# Patient Record
Sex: FEMALE | Race: WHITE | NOT HISPANIC OR LATINO
[De-identification: names, ages, dates, MRNs, and addresses within clinical notes are randomized per-mention and may not be internally consistent; named-entity substitution may affect disease eponyms.]

---

## 2017-07-16 ENCOUNTER — TRANSCRIPTION ENCOUNTER (OUTPATIENT)
Age: 77
End: 2017-07-16

## 2017-07-17 ENCOUNTER — INPATIENT (INPATIENT)
Facility: HOSPITAL | Age: 77
LOS: 3 days | Discharge: HOME | DRG: 392 | End: 2017-07-21
Attending: SURGERY | Admitting: SURGERY
Payer: MEDICARE

## 2017-07-17 VITALS
RESPIRATION RATE: 18 BRPM | DIASTOLIC BLOOD PRESSURE: 74 MMHG | HEIGHT: 60 IN | WEIGHT: 139.99 LBS | OXYGEN SATURATION: 96 % | TEMPERATURE: 100 F | HEART RATE: 98 BPM | SYSTOLIC BLOOD PRESSURE: 116 MMHG

## 2017-07-17 DIAGNOSIS — K57.20 DIVERTICULITIS OF LARGE INTESTINE WITH PERFORATION AND ABSCESS WITHOUT BLEEDING: ICD-10-CM

## 2017-07-17 DIAGNOSIS — Z87.898 PERSONAL HISTORY OF OTHER SPECIFIED CONDITIONS: Chronic | ICD-10-CM

## 2017-07-17 LAB
ALBUMIN SERPL ELPH-MCNC: 3.7 G/DL — SIGNIFICANT CHANGE UP (ref 3.3–5.2)
ALP SERPL-CCNC: 71 U/L — SIGNIFICANT CHANGE UP (ref 40–120)
ALT FLD-CCNC: 26 U/L — SIGNIFICANT CHANGE UP
AMYLASE P1 CFR SERPL: 16 U/L — LOW (ref 36–128)
ANION GAP SERPL CALC-SCNC: 11 MMOL/L — SIGNIFICANT CHANGE UP (ref 5–17)
APPEARANCE UR: CLEAR — SIGNIFICANT CHANGE UP
AST SERPL-CCNC: 26 U/L — SIGNIFICANT CHANGE UP
BACTERIA # UR AUTO: ABNORMAL
BASOPHILS # BLD AUTO: 0 K/UL — SIGNIFICANT CHANGE UP (ref 0–0.2)
BASOPHILS NFR BLD AUTO: 0.1 % — SIGNIFICANT CHANGE UP (ref 0–2)
BILIRUB SERPL-MCNC: 0.8 MG/DL — SIGNIFICANT CHANGE UP (ref 0.4–2)
BILIRUB UR-MCNC: NEGATIVE — SIGNIFICANT CHANGE UP
BUN SERPL-MCNC: 7 MG/DL — LOW (ref 8–20)
CALCIUM SERPL-MCNC: 8.9 MG/DL — SIGNIFICANT CHANGE UP (ref 8.6–10.2)
CHLORIDE SERPL-SCNC: 96 MMOL/L — LOW (ref 98–107)
CO2 SERPL-SCNC: 25 MMOL/L — SIGNIFICANT CHANGE UP (ref 22–29)
COLOR SPEC: YELLOW — SIGNIFICANT CHANGE UP
COMMENT - URINE: SIGNIFICANT CHANGE UP
CREAT SERPL-MCNC: 0.59 MG/DL — SIGNIFICANT CHANGE UP (ref 0.5–1.3)
DIFF PNL FLD: ABNORMAL
GLUCOSE SERPL-MCNC: 125 MG/DL — HIGH (ref 70–115)
GLUCOSE UR QL: NEGATIVE MG/DL — SIGNIFICANT CHANGE UP
HCT VFR BLD CALC: 35.7 % — LOW (ref 37–47)
HGB BLD-MCNC: 11.8 G/DL — LOW (ref 12–16)
KETONES UR-MCNC: NEGATIVE — SIGNIFICANT CHANGE UP
LEUKOCYTE ESTERASE UR-ACNC: NEGATIVE — SIGNIFICANT CHANGE UP
LIDOCAIN IGE QN: 14 U/L — LOW (ref 22–51)
LYMPHOCYTES # BLD AUTO: 0.9 K/UL — LOW (ref 1–4.8)
LYMPHOCYTES # BLD AUTO: 6 % — LOW (ref 20–55)
MAGNESIUM SERPL-MCNC: 1.8 MG/DL — SIGNIFICANT CHANGE UP (ref 1.6–2.6)
MCHC RBC-ENTMCNC: 29.6 PG — SIGNIFICANT CHANGE UP (ref 27–31)
MCHC RBC-ENTMCNC: 33.1 G/DL — SIGNIFICANT CHANGE UP (ref 32–36)
MCV RBC AUTO: 89.5 FL — SIGNIFICANT CHANGE UP (ref 81–99)
MONOCYTES # BLD AUTO: 0.5 K/UL — SIGNIFICANT CHANGE UP (ref 0–0.8)
MONOCYTES NFR BLD AUTO: 3.5 % — SIGNIFICANT CHANGE UP (ref 3–10)
NEUTROPHILS # BLD AUTO: 13.6 K/UL — HIGH (ref 1.8–8)
NEUTROPHILS NFR BLD AUTO: 90.3 % — HIGH (ref 37–73)
NITRITE UR-MCNC: NEGATIVE — SIGNIFICANT CHANGE UP
PH UR: 6.5 — SIGNIFICANT CHANGE UP (ref 5–8)
PLATELET # BLD AUTO: 207 K/UL — SIGNIFICANT CHANGE UP (ref 150–400)
POTASSIUM SERPL-MCNC: 3.6 MMOL/L — SIGNIFICANT CHANGE UP (ref 3.5–5.3)
POTASSIUM SERPL-SCNC: 3.6 MMOL/L — SIGNIFICANT CHANGE UP (ref 3.5–5.3)
PROT SERPL-MCNC: 6.2 G/DL — LOW (ref 6.6–8.7)
PROT UR-MCNC: 15 MG/DL
RBC # BLD: 3.99 M/UL — LOW (ref 4.4–5.2)
RBC # FLD: 13 % — SIGNIFICANT CHANGE UP (ref 11–15.6)
RBC CASTS # UR COMP ASSIST: ABNORMAL /HPF (ref 0–4)
SODIUM SERPL-SCNC: 132 MMOL/L — LOW (ref 135–145)
SP GR SPEC: 1.01 — SIGNIFICANT CHANGE UP (ref 1.01–1.02)
UROBILINOGEN FLD QL: NEGATIVE MG/DL — SIGNIFICANT CHANGE UP
WBC # BLD: 15.1 K/UL — HIGH (ref 4.8–10.8)
WBC # FLD AUTO: 15.1 K/UL — HIGH (ref 4.8–10.8)
WBC UR QL: SIGNIFICANT CHANGE UP

## 2017-07-17 PROCEDURE — 74176 CT ABD & PELVIS W/O CONTRAST: CPT | Mod: 26

## 2017-07-17 PROCEDURE — 74020: CPT | Mod: 26

## 2017-07-17 PROCEDURE — 93010 ELECTROCARDIOGRAM REPORT: CPT

## 2017-07-17 PROCEDURE — 99285 EMERGENCY DEPT VISIT HI MDM: CPT

## 2017-07-17 PROCEDURE — 99222 1ST HOSP IP/OBS MODERATE 55: CPT

## 2017-07-17 RX ORDER — SODIUM CHLORIDE 9 MG/ML
500 INJECTION INTRAMUSCULAR; INTRAVENOUS; SUBCUTANEOUS ONCE
Qty: 0 | Refills: 0 | Status: COMPLETED | OUTPATIENT
Start: 2017-07-17 | End: 2017-07-17

## 2017-07-17 RX ORDER — METRONIDAZOLE 500 MG
500 TABLET ORAL ONCE
Qty: 0 | Refills: 0 | Status: COMPLETED | OUTPATIENT
Start: 2017-07-17 | End: 2017-07-17

## 2017-07-17 RX ORDER — CARVEDILOL PHOSPHATE 80 MG/1
3.12 CAPSULE, EXTENDED RELEASE ORAL EVERY 12 HOURS
Qty: 0 | Refills: 0 | Status: DISCONTINUED | OUTPATIENT
Start: 2017-07-17 | End: 2017-07-21

## 2017-07-17 RX ORDER — KETOROLAC TROMETHAMINE 30 MG/ML
15 SYRINGE (ML) INJECTION EVERY 6 HOURS
Qty: 0 | Refills: 0 | Status: DISCONTINUED | OUTPATIENT
Start: 2017-07-17 | End: 2017-07-19

## 2017-07-17 RX ORDER — LOSARTAN POTASSIUM 100 MG/1
25 TABLET, FILM COATED ORAL DAILY
Qty: 0 | Refills: 0 | Status: DISCONTINUED | OUTPATIENT
Start: 2017-07-17 | End: 2017-07-21

## 2017-07-17 RX ORDER — MEROPENEM 1 G/30ML
1000 INJECTION INTRAVENOUS EVERY 8 HOURS
Qty: 0 | Refills: 0 | Status: DISCONTINUED | OUTPATIENT
Start: 2017-07-17 | End: 2017-07-17

## 2017-07-17 RX ORDER — MEROPENEM 1 G/30ML
INJECTION INTRAVENOUS
Qty: 0 | Refills: 0 | Status: DISCONTINUED | OUTPATIENT
Start: 2017-07-17 | End: 2017-07-17

## 2017-07-17 RX ORDER — ONDANSETRON 8 MG/1
4 TABLET, FILM COATED ORAL EVERY 6 HOURS
Qty: 0 | Refills: 0 | Status: DISCONTINUED | OUTPATIENT
Start: 2017-07-17 | End: 2017-07-21

## 2017-07-17 RX ORDER — PIPERACILLIN AND TAZOBACTAM 4; .5 G/20ML; G/20ML
3.38 INJECTION, POWDER, LYOPHILIZED, FOR SOLUTION INTRAVENOUS ONCE
Qty: 0 | Refills: 0 | Status: DISCONTINUED | OUTPATIENT
Start: 2017-07-17 | End: 2017-07-20

## 2017-07-17 RX ORDER — KETOROLAC TROMETHAMINE 30 MG/ML
15 SYRINGE (ML) INJECTION ONCE
Qty: 0 | Refills: 0 | Status: DISCONTINUED | OUTPATIENT
Start: 2017-07-17 | End: 2017-07-17

## 2017-07-17 RX ORDER — SODIUM CHLORIDE 9 MG/ML
1000 INJECTION, SOLUTION INTRAVENOUS
Qty: 0 | Refills: 0 | Status: DISCONTINUED | OUTPATIENT
Start: 2017-07-17 | End: 2017-07-20

## 2017-07-17 RX ORDER — MEROPENEM 1 G/30ML
1000 INJECTION INTRAVENOUS ONCE
Qty: 0 | Refills: 0 | Status: COMPLETED | OUTPATIENT
Start: 2017-07-17 | End: 2017-07-17

## 2017-07-17 RX ORDER — PIPERACILLIN AND TAZOBACTAM 4; .5 G/20ML; G/20ML
3.38 INJECTION, POWDER, LYOPHILIZED, FOR SOLUTION INTRAVENOUS EVERY 8 HOURS
Qty: 0 | Refills: 0 | Status: DISCONTINUED | OUTPATIENT
Start: 2017-07-17 | End: 2017-07-20

## 2017-07-17 RX ORDER — ENOXAPARIN SODIUM 100 MG/ML
40 INJECTION SUBCUTANEOUS EVERY 24 HOURS
Qty: 0 | Refills: 0 | Status: DISCONTINUED | OUTPATIENT
Start: 2017-07-17 | End: 2017-07-21

## 2017-07-17 RX ADMIN — Medication 15 MILLIGRAM(S): at 00:00

## 2017-07-17 RX ADMIN — Medication 15 MILLIGRAM(S): at 20:00

## 2017-07-17 RX ADMIN — LOSARTAN POTASSIUM 25 MILLIGRAM(S): 100 TABLET, FILM COATED ORAL at 21:37

## 2017-07-17 RX ADMIN — SODIUM CHLORIDE 103 MILLILITER(S): 9 INJECTION, SOLUTION INTRAVENOUS at 23:32

## 2017-07-17 RX ADMIN — Medication 15 MILLIGRAM(S): at 06:23

## 2017-07-17 RX ADMIN — Medication 15 MILLIGRAM(S): at 07:15

## 2017-07-17 RX ADMIN — MEROPENEM 200 MILLIGRAM(S): 1 INJECTION INTRAVENOUS at 10:00

## 2017-07-17 RX ADMIN — Medication 100 MILLIGRAM(S): at 11:45

## 2017-07-17 RX ADMIN — SODIUM CHLORIDE 500 MILLILITER(S): 9 INJECTION INTRAMUSCULAR; INTRAVENOUS; SUBCUTANEOUS at 06:23

## 2017-07-17 RX ADMIN — Medication 15 MILLIGRAM(S): at 14:31

## 2017-07-17 RX ADMIN — PIPERACILLIN AND TAZOBACTAM 25 GRAM(S): 4; .5 INJECTION, POWDER, LYOPHILIZED, FOR SOLUTION INTRAVENOUS at 23:33

## 2017-07-17 RX ADMIN — SODIUM CHLORIDE 103 MILLILITER(S): 9 INJECTION, SOLUTION INTRAVENOUS at 16:56

## 2017-07-17 RX ADMIN — Medication 15 MILLIGRAM(S): at 20:02

## 2017-07-17 NOTE — H&P ADULT - NSHPLABSRESULTS_GEN_ALL_CORE
CT A/P: Oral contrast: Perforated  sigmoid  colon  diverticulitis  with  free  air  within  the  upper  abdomen  as  described.  No  drainable  abscess  seen.  No  bowel  obstruction.

## 2017-07-17 NOTE — ED ADULT NURSE REASSESSMENT NOTE - NS ED NURSE REASSESS COMMENT FT1
pt status unchanged, refer to flowsheet and chart, pt safety maintained, pt hemodynamically stable, pending transfer to B2 right
pt status unchanged, refer to flowsheet and chart, pt safety maintained, pt hemodynamically stable

## 2017-07-17 NOTE — H&P ADULT - PROBLEM SELECTOR PLAN 1
Admit to ACS  Neuro: Pain control- Toradol prn   Cardio: Monitor BP and HR, reconcile home medications when patient is advanced to PO  Pulm: Pulse ox w/ vital signs q 4 hours, Encourage incentive spirometry   Gastro: NPO, IVFs- LR @ 103cc/hr  Genitourinary- Monitor urine output  DVT: Lovenox 40 mg daily  ID: Zosyn IV  Labs: trend leukocytosis w/ CBC  Serial abdominal exams  Discussed with patient that if her laboratory and clinical exam does not improve, will highly consider operative intervention for resection of involved colon, possible ostomy Admit to ACS  Neuro: Pain control- Toradol prn   Cardio: Monitor BP and HR, reconcile home medications when patient is advanced to PO  Pulm: Pulse ox w/ vital signs q 4 hours, Encourage incentive spirometry   Gastro: NPO, IVFs- LR @ 103cc/hr  Genitourinary- Monitor urine output  DVT: Lovenox 40 mg daily  ID: Zosyn IV  Labs: trend leukocytosis w/ CBC  Serial abdominal exams  Discussed with patient that if her laboratory and clinical exam does not improve, will need operative intervention for resection of involved colon, possible ostomy

## 2017-07-17 NOTE — H&P ADULT - NSHPPHYSICALEXAM_GEN_ALL_CORE
General: NAD  Chest: Normal S1 and S2   Pulm: CTAB  Abd: soft, nondistended. She is TTP in the RLQ and LLQ. No rebound tenderness, no guarding or rigidity  Ext: No edema

## 2017-07-17 NOTE — H&P ADULT - HISTORY OF PRESENT ILLNESS
76 y/o F comes to the ED w/ c/o abdominal pain since two days ago. The pain started suddenly two days ago at night when the patient was going to sleep, initially described the pain as crampy located in her RLQ radiating ot her LLQ. This is the first time she has had this pain. She went to a walk-in urgent care clinic Dion[place] morning and was treated for possible UTI, was given instructions to return to the ED if her pain did not get better. She comes in today because she still has abdominal pain prominent in the same areas. She recorded her temperature at home and endorsed it was 100.9F however temperature was normal in the urgent care clinic and in the ED. She denies nausea, vomiting, diarrhea, dysuria, chest pain, or palpitations. She had a colonoscopy 3 years ago and upper endoscopy which were both normal. Her last BM was this morning, constipated, currently passing flatus.    Allergies: Ciprofloxacin, Keflex (RASH), codeine (Chest pain)    PMH:  Breast CA 17 years ago  HTN  Pacemaker (Recently interrogated by her Cardiologist one week ago)    PSH:  open appendectomy  Cholecystectomy  Bilateral oophorectomy 78 y/o F comes to the ED w/ c/o abdominal pain since two days ago. The pain started suddenly two days ago at night when the patient was going to sleep, initially described the pain as crampy located in her RLQ radiating to her LLQ. This is the first time she has had this pain. She went to a walk-in urgent care clinic [place] morning and was treated for possible UTI, was given instructions to return to the ED if her pain did not get better. She comes in today because she still has abdominal pain prominent in the same areas. She recorded her temperature at home and noted it was 100.9F however temperature was normal in the urgent care clinic and in the ED. She denies nausea, vomiting, diarrhea, dysuria, chest pain, or palpitations. She had a colonoscopy 3 years ago and upper endoscopy which were both normal. Her last BM was this morning, constipated, currently passing flatus.    Allergies: Ciprofloxacin, Keflex (RASH), codeine (Chest pain)    PMH:  Breast CA 17 years ago  HTN  Pacemaker (Recently interrogated by her Cardiologist one week ago)    PSH:  open appendectomy  Cholecystectomy  Bilateral oophorectomy

## 2017-07-17 NOTE — PATIENT PROFILE ADULT. - ABILITY TO HEAR (WITH HEARING AID OR HEARING APPLIANCE IF NORMALLY USED):
Mildly to Moderately Impaired: difficulty hearing in some environments or speaker may need to increase volume or speak distinctly/b/l ears. wearing hearing aide in each ear

## 2017-07-17 NOTE — ED ADULT NURSE NOTE - OBJECTIVE STATEMENT
Pt states "I was having abdominal pain and went to the urgent care and they told me I have a bladder infection, they told me if I still had pain to come to the hospital", pt has taken 2 macrobid pills yesterday, c/o nausea, denies vomiting

## 2017-07-17 NOTE — ED PROVIDER NOTE - OBJECTIVE STATEMENT
78 yo F p/w lower abd pain x 2 days.  visited urgent care yesterday and was dx with uti.  pain worse now with occasional nausea.  no fever, chills, diarrhea

## 2017-07-18 RX ADMIN — Medication 15 MILLIGRAM(S): at 14:00

## 2017-07-18 RX ADMIN — LOSARTAN POTASSIUM 25 MILLIGRAM(S): 100 TABLET, FILM COATED ORAL at 21:25

## 2017-07-18 RX ADMIN — Medication 15 MILLIGRAM(S): at 06:35

## 2017-07-18 RX ADMIN — PIPERACILLIN AND TAZOBACTAM 25 GRAM(S): 4; .5 INJECTION, POWDER, LYOPHILIZED, FOR SOLUTION INTRAVENOUS at 14:38

## 2017-07-18 RX ADMIN — PIPERACILLIN AND TAZOBACTAM 25 GRAM(S): 4; .5 INJECTION, POWDER, LYOPHILIZED, FOR SOLUTION INTRAVENOUS at 22:18

## 2017-07-18 RX ADMIN — PIPERACILLIN AND TAZOBACTAM 25 GRAM(S): 4; .5 INJECTION, POWDER, LYOPHILIZED, FOR SOLUTION INTRAVENOUS at 06:22

## 2017-07-18 RX ADMIN — Medication 15 MILLIGRAM(S): at 22:33

## 2017-07-18 RX ADMIN — Medication 63.75 MILLIMOLE(S): at 14:38

## 2017-07-18 RX ADMIN — CARVEDILOL PHOSPHATE 3.12 MILLIGRAM(S): 80 CAPSULE, EXTENDED RELEASE ORAL at 06:18

## 2017-07-18 RX ADMIN — Medication 63.75 MILLIMOLE(S): at 11:10

## 2017-07-18 RX ADMIN — SODIUM CHLORIDE 103 MILLILITER(S): 9 INJECTION, SOLUTION INTRAVENOUS at 14:39

## 2017-07-18 RX ADMIN — Medication 15 MILLIGRAM(S): at 22:36

## 2017-07-18 RX ADMIN — Medication 15 MILLIGRAM(S): at 06:32

## 2017-07-18 RX ADMIN — CARVEDILOL PHOSPHATE 3.12 MILLIGRAM(S): 80 CAPSULE, EXTENDED RELEASE ORAL at 16:17

## 2017-07-18 RX ADMIN — ENOXAPARIN SODIUM 40 MILLIGRAM(S): 100 INJECTION SUBCUTANEOUS at 06:30

## 2017-07-18 RX ADMIN — Medication 15 MILLIGRAM(S): at 13:03

## 2017-07-18 NOTE — PROGRESS NOTE ADULT - SUBJECTIVE AND OBJECTIVE BOX
HPI/OVERNIGHT EVENTS: Patient seen and examined at bedside. She reports that abdominal pain has improved since yesterday and that pain medications give some relief. Pain localized mostly to RLQ. She denies nausea / vomiting. Passing flatus. Urinating without difficulty, and OOB ambulating. Denies feelings of fever, chills, CP, or SOB.     MEDICATIONS  (STANDING):  lactated ringers. 1000 milliLiter(s) (103 mL/Hr) IV Continuous <Continuous>  piperacillin/tazobactam IVPB. 3.375 Gram(s) IV Intermittent once  piperacillin/tazobactam IVPB. 3.375 Gram(s) IV Intermittent every 8 hours  enoxaparin Injectable 40 milliGRAM(s) SubCutaneous every 24 hours  losartan 25 milliGRAM(s) Oral daily  carvedilol 3.125 milliGRAM(s) Oral every 12 hours  sodium phosphate IVPB 15 milliMole(s) IV Intermittent every 4 hours    MEDICATIONS  (PRN):  ondansetron Injectable 4 milliGRAM(s) IV Push every 6 hours PRN Nausea  ketorolac   Injectable 15 milliGRAM(s) IV Push every 6 hours PRN Mild Pain (1 - 3)      Vital Signs Last 24 Hrs  T(C): 37.1 (2017 08:37), Max: 37.3 (2017 15:30)  T(F): 98.8 (2017 08:37), Max: 99.1 (2017 15:30)  HR: 66 (2017 08:37) (66 - 78)  BP: 109/59 (2017 08:37) (105/55 - 122/61)  BP(mean): --  RR: 18 (2017 08:37) (18 - 18)  SpO2: 95% (2017 08:37) (94% - 97%)    Constitutional: patient resting comfortably sitting up in bed in no acute distress  HEENT: EOMI / PERRL b/l  Respiratory: respirations are unlabored, no accessory muscle use, no conversational dyspnea  Cardiovascular: regular rate & rhythm  Gastrointestinal: abdomen soft and non-distended, +TTP of RLQ with voluntary guarding, no rebound tenderness  Neurological: GCS: 15 (4/5/6). A&O x 3; no gross sensory / motor / coordination deficits  Psychiatric: Normal mood, normal affect  Musculoskeletal: No joint pain, swelling or deformity; no limitation of movement      I&O's Detail      LABS:                        10.7   9.0   )-----------( 162      ( 2017 06:19 )             31.8         138  |  100  |  10.0  ----------------------------<  108  4.0   |  26.0  |  0.62    Ca    8.8      2017 06:19  Phos  1.8       Mg     2.1         TPro  6.2<L>  /  Alb  3.7  /  TBili  0.8  /  DBili  x   /  AST  26  /  ALT  26  /  AlkPhos  71  07-17    PT/INR - ( 2017 06:19 )   PT: 13.8 sec;   INR: 1.25 ratio         PTT - ( 2017 13:06 )  PTT:28.6 sec  Urinalysis Basic - ( 2017 06:36 )    Color: Yellow / Appearance: Clear / S.010 / pH: x  Gluc: x / Ketone: Negative  / Bili: Negative / Urobili: Negative mg/dL   Blood: x / Protein: 15 mg/dL / Nitrite: Negative   Leuk Esterase: Negative / RBC: 11-25 /HPF / WBC 0-2   Sq Epi: x / Non Sq Epi: x / Bacteria: x

## 2017-07-18 NOTE — PROGRESS NOTE ADULT - PROBLEM SELECTOR PLAN 1
- Continue with conservative mgmt for now  - NPO with IVF  - Continue IV Zosyn  - Serial abdominal exams  - Pain control PRN  - Monitor AM labs, WBC trended down today from 15.1 to 9  - Encourage ambulation  - DVT ppx with lovenox and SCDs  - Encourage incentive payal use for pulm toileting

## 2017-07-19 ENCOUNTER — TRANSCRIPTION ENCOUNTER (OUTPATIENT)
Age: 77
End: 2017-07-19

## 2017-07-19 LAB
-  AMIKACIN: SIGNIFICANT CHANGE UP
-  AMPICILLIN/SULBACTAM: SIGNIFICANT CHANGE UP
-  AMPICILLIN: SIGNIFICANT CHANGE UP
-  AZTREONAM: SIGNIFICANT CHANGE UP
-  CEFAZOLIN: SIGNIFICANT CHANGE UP
-  CEFEPIME: SIGNIFICANT CHANGE UP
-  CEFOXITIN: SIGNIFICANT CHANGE UP
-  CEFTAZIDIME: SIGNIFICANT CHANGE UP
-  CEFTRIAXONE: SIGNIFICANT CHANGE UP
-  CIPROFLOXACIN: SIGNIFICANT CHANGE UP
-  ERTAPENEM: SIGNIFICANT CHANGE UP
-  GENTAMICIN: SIGNIFICANT CHANGE UP
-  IMIPENEM: SIGNIFICANT CHANGE UP
-  LEVOFLOXACIN: SIGNIFICANT CHANGE UP
-  MEROPENEM: SIGNIFICANT CHANGE UP
-  NITROFURANTOIN: SIGNIFICANT CHANGE UP
-  PIPERACILLIN/TAZOBACTAM: SIGNIFICANT CHANGE UP
-  TOBRAMYCIN: SIGNIFICANT CHANGE UP
-  TRIMETHOPRIM/SULFAMETHOXAZOLE: SIGNIFICANT CHANGE UP
ANION GAP SERPL CALC-SCNC: 11 MMOL/L — SIGNIFICANT CHANGE UP (ref 5–17)
BUN SERPL-MCNC: 10 MG/DL — SIGNIFICANT CHANGE UP (ref 8–20)
CALCIUM SERPL-MCNC: 8.6 MG/DL — SIGNIFICANT CHANGE UP (ref 8.6–10.2)
CHLORIDE SERPL-SCNC: 100 MMOL/L — SIGNIFICANT CHANGE UP (ref 98–107)
CO2 SERPL-SCNC: 25 MMOL/L — SIGNIFICANT CHANGE UP (ref 22–29)
CREAT SERPL-MCNC: 0.59 MG/DL — SIGNIFICANT CHANGE UP (ref 0.5–1.3)
CULTURE RESULTS: SIGNIFICANT CHANGE UP
GLUCOSE SERPL-MCNC: 94 MG/DL — SIGNIFICANT CHANGE UP (ref 70–115)
MAGNESIUM SERPL-MCNC: 1.9 MG/DL — SIGNIFICANT CHANGE UP (ref 1.6–2.6)
METHOD TYPE: SIGNIFICANT CHANGE UP
ORGANISM # SPEC MICROSCOPIC CNT: SIGNIFICANT CHANGE UP
ORGANISM # SPEC MICROSCOPIC CNT: SIGNIFICANT CHANGE UP
PHOSPHATE SERPL-MCNC: 2.6 MG/DL — SIGNIFICANT CHANGE UP (ref 2.4–4.7)
POTASSIUM SERPL-MCNC: 3.4 MMOL/L — LOW (ref 3.5–5.3)
POTASSIUM SERPL-SCNC: 3.4 MMOL/L — LOW (ref 3.5–5.3)
SODIUM SERPL-SCNC: 136 MMOL/L — SIGNIFICANT CHANGE UP (ref 135–145)
SPECIMEN SOURCE: SIGNIFICANT CHANGE UP

## 2017-07-19 RX ORDER — IBUPROFEN 200 MG
400 TABLET ORAL EVERY 4 HOURS
Qty: 0 | Refills: 0 | Status: DISCONTINUED | OUTPATIENT
Start: 2017-07-19 | End: 2017-07-21

## 2017-07-19 RX ORDER — DOCUSATE SODIUM 100 MG
100 CAPSULE ORAL THREE TIMES A DAY
Qty: 0 | Refills: 0 | Status: DISCONTINUED | OUTPATIENT
Start: 2017-07-19 | End: 2017-07-21

## 2017-07-19 RX ORDER — ACETAMINOPHEN 500 MG
650 TABLET ORAL EVERY 6 HOURS
Qty: 0 | Refills: 0 | Status: DISCONTINUED | OUTPATIENT
Start: 2017-07-19 | End: 2017-07-21

## 2017-07-19 RX ORDER — SENNA PLUS 8.6 MG/1
2 TABLET ORAL AT BEDTIME
Qty: 0 | Refills: 0 | Status: DISCONTINUED | OUTPATIENT
Start: 2017-07-19 | End: 2017-07-21

## 2017-07-19 RX ADMIN — ENOXAPARIN SODIUM 40 MILLIGRAM(S): 100 INJECTION SUBCUTANEOUS at 05:28

## 2017-07-19 RX ADMIN — LOSARTAN POTASSIUM 25 MILLIGRAM(S): 100 TABLET, FILM COATED ORAL at 21:12

## 2017-07-19 RX ADMIN — PIPERACILLIN AND TAZOBACTAM 25 GRAM(S): 4; .5 INJECTION, POWDER, LYOPHILIZED, FOR SOLUTION INTRAVENOUS at 05:29

## 2017-07-19 RX ADMIN — CARVEDILOL PHOSPHATE 3.12 MILLIGRAM(S): 80 CAPSULE, EXTENDED RELEASE ORAL at 17:55

## 2017-07-19 RX ADMIN — CARVEDILOL PHOSPHATE 3.12 MILLIGRAM(S): 80 CAPSULE, EXTENDED RELEASE ORAL at 05:25

## 2017-07-19 RX ADMIN — PIPERACILLIN AND TAZOBACTAM 25 GRAM(S): 4; .5 INJECTION, POWDER, LYOPHILIZED, FOR SOLUTION INTRAVENOUS at 14:48

## 2017-07-19 RX ADMIN — PIPERACILLIN AND TAZOBACTAM 25 GRAM(S): 4; .5 INJECTION, POWDER, LYOPHILIZED, FOR SOLUTION INTRAVENOUS at 21:12

## 2017-07-19 RX ADMIN — Medication 15 MILLIGRAM(S): at 22:00

## 2017-07-19 RX ADMIN — SODIUM CHLORIDE 103 MILLILITER(S): 9 INJECTION, SOLUTION INTRAVENOUS at 21:12

## 2017-07-19 RX ADMIN — Medication 15 MILLIGRAM(S): at 21:18

## 2017-07-19 NOTE — DISCHARGE NOTE ADULT - CARE PLAN
Principal Discharge DX:	Diverticulitis of colon with perforation  Goal:	Alleviation of pain and symptoms  Instructions for follow-up, activity and diet:	Follow up: Please call and make an appointment with the [facility] 10-14 days after discharge. Also, please call and make an appointment with your primary care physician as per your usual schedule.   Activity: May return to normal activities as tolerated.  Diet: May continue regular diet - recommend a low fiber diet until acute episode of diverticulitis resolved.  Medications: Please take all home medications as prescribed by your primary care doctor. Pain medication has been prescribed for you. Please, take it as it has been prescribed, do not drive or operate heavy machinery while taking narcotics.  You are encouraged to take over-the-counter tylenol and/or ibuprofen for pain relief when you feel your pain no longer warrants the use of narcotic pain medications, however DO NOT TAKE percocet and tylenol at the same time as they contain the same active ingredient (acetaminophen). Take only percocet OR tylenol.  Wound Care: Please, keep wound site clean and dry. You may shower, but do not bathe  [sutures, staples, special instructions]   If confusion, altered mental status, fever, chest pain, shortness of breath, new or worsening [] pain, vomiting, change or worsening of medical status, please come back to the emergency room, and in case of emergency call 911.  Secondary Diagnosis:	HTN (hypertension) Principal Discharge DX:	Diverticulitis of colon with perforation  Goal:	Alleviation of pain and symptoms  Instructions for follow-up, activity and diet:	Follow up: Please call and make an appointment with the Acute Care Surgery Clinic 10-14 days after discharge. Also, please call and make an appointment with your primary care physician as per your usual schedule.   Activity: May return to normal activities as tolerated.  Diet: May continue regular diet - recommend a low fiber diet until acute episode of diverticulitis resolved.  Medications: Please take all home medications as prescribed by your primary care doctor. You may take over-the-counter tylenol and/or ibuprofen for pain relief as needed. You have been sent home on a course of antibiotics - please take as prescribed, do not skip doses. Take with food to avoid stomach upset.  Patient is advised to RETURN TO THE EMERGENCY DEPARTMENT for any of the following - worsening pain, fever/chills, nausea/vomiting, altered mental status, chest pain, shortness of breath, or any other new / worsening symptom.  Secondary Diagnosis:	HTN (hypertension)  Instructions for follow-up, activity and diet:	Please resume all home blood pressure medications at current doses, monitor blood pressure at home, and follow-up with your cardiologist and/or your primary care provider as per your usual schedule.

## 2017-07-19 NOTE — DISCHARGE NOTE ADULT - HOSPITAL COURSE
76 y/o F comes to the ED w/ c/o abdominal pain since two days ago. The pain started suddenly two days ago at night when the patient was going to sleep, initially described the pain as crampy located in her RLQ radiating to her LLQ. This is the first time she has had this pain. She went to a walk-in urgent care clinic Sunday morning and was treated for possible UTI, was given instructions to return to the ED if her pain did not get better. She comes in today because she still has abdominal pain prominent in the same areas. She recorded her temperature at home and noted it was 100.9F however temperature was normal in the urgent care clinic and in the ED. She denies nausea, vomiting, diarrhea, dysuria, chest pain, or palpitations. She had a colonoscopy 3 years ago and upper endoscopy which were both normal. Her last BM was this morning, constipated, currently passing flatus.    Hospital Course: CT abdomen & pelvis on admission showed perforated sigmoid colon diverticulitis with free air within the upper abdomen; no drainable abscess seen; no bowel obstruction. Patient was admitted to the acute care surgery service, placed on IV Abx and kept NPO with IVF and IV pain medications PRN. As WBC trended down and abdominal pain / abdominal exam improved, patient's diet was gradually advanced from NPO to clear liquids to regular. When tolerating regular diet, IV pain medications and IV antibiotics transitioned to oral. Pt tolerated advancement of diet and transition to PO medications well. At time of d/c, pt remains HD well, OOB ambulating, tolerating regular diet, afebrile, abdominal pain improved and controlled on PO non-narcotic medications, voiding and having bowel function. Stable for d/c home with outpatient follow-up as outlined above.    Patient is advised to RETURN TO THE EMERGENCY DEPARTMENT for any of the following - worsening pain, fever/chills, nausea/vomiting, altered mental status, chest pain, shortness of breath, or any other new / worsening symptom.

## 2017-07-19 NOTE — PROGRESS NOTE ADULT - SUBJECTIVE AND OBJECTIVE BOX
INTERVAL HPI/OVERNIGHT EVENTS:  Pt seen and examined at bedside. No acute events overnight. Pt currently denies abdominal pain, nausea, and vomiting. She is out of bed and is ambulating      SUBJECTIVE:      MEDICATIONS  (STANDING):  lactated ringers. 1000 milliLiter(s) (103 mL/Hr) IV Continuous <Continuous>  piperacillin/tazobactam IVPB. 3.375 Gram(s) IV Intermittent once  piperacillin/tazobactam IVPB. 3.375 Gram(s) IV Intermittent every 8 hours  enoxaparin Injectable 40 milliGRAM(s) SubCutaneous every 24 hours  losartan 25 milliGRAM(s) Oral daily  carvedilol 3.125 milliGRAM(s) Oral every 12 hours    MEDICATIONS  (PRN):  ondansetron Injectable 4 milliGRAM(s) IV Push every 6 hours PRN Nausea  ketorolac   Injectable 15 milliGRAM(s) IV Push every 6 hours PRN Mild Pain (1 - 3)      Vital Signs Last 24 Hrs  T(C): 37.2 (19 Jul 2017 08:06), Max: 37.3 (19 Jul 2017 05:01)  T(F): 98.9 (19 Jul 2017 08:06), Max: 99.1 (19 Jul 2017 05:01)  HR: 67 (19 Jul 2017 08:06) (56 - 67)  BP: 126/65 (19 Jul 2017 08:06) (111/51 - 126/65)  BP(mean): --  RR: 16 (19 Jul 2017 08:06) (16 - 18)  SpO2: 94% (19 Jul 2017 08:06) (94% - 97%)    PE  Gen: no acute distress  Pulm: clear to auscultation bilaterally  CV: s1/s2  Abd: soft, non distended. RLQ tenderness. No guarding or rebound.  Vasc: radial pulse 2+, bilateral      I&O's Detail    18 Jul 2017 07:01  -  19 Jul 2017 07:00  --------------------------------------------------------  IN:    lactated ringers.: 927 mL  Total IN: 927 mL    OUT:    Voided: 850 mL  Total OUT: 850 mL    Total NET: 77 mL          LABS:                        10.7   9.0   )-----------( 162      ( 18 Jul 2017 06:19 )             31.8     07-19    136  |  100  |  10.0  ----------------------------<  94  3.4<L>   |  25.0  |  0.59    Ca    8.6      19 Jul 2017 05:42  Phos  2.6     07-19  Mg     1.9     07-19      PT/INR - ( 18 Jul 2017 06:19 )   PT: 13.8 sec;   INR: 1.25 ratio               RADIOLOGY & ADDITIONAL STUDIES:

## 2017-07-19 NOTE — DISCHARGE NOTE ADULT - MEDICATION SUMMARY - MEDICATIONS TO TAKE
I will START or STAY ON the medications listed below when I get home from the hospital:    acetaminophen 325 mg oral tablet  -- 2 tab(s) by mouth every 6 hours, As needed, Mild Pain (1 - 3)  -- Indication: For Pain    irbesartan 150 mg oral tablet  -- 1 tab(s) by mouth once a day  -- Indication: For  Home med    Coreg 3.125 mg oral tablet  -- 1 tab(s) by mouth 2 times a day  -- Indication: For Home med    docusate sodium 100 mg oral capsule  -- 1 cap(s) by mouth 3 times a day  -- Indication: For constipation    senna oral tablet  -- 2 tab(s) by mouth once a day (at bedtime)  -- Indication: For constipatoin    amoxicillin-clavulanate 875 mg-125 mg oral tablet  -- 1 tab(s) by mouth 2 times a day  -- Indication: For Diverticulitis of colon with perforation

## 2017-07-19 NOTE — DISCHARGE NOTE ADULT - PATIENT PORTAL LINK FT
“You can access the [employer] Patient Portal, offered by [facility], by registering with the following website: [email or web address]”

## 2017-07-19 NOTE — PROGRESS NOTE ADULT - ASSESSMENT
75 yo female presents to marzena type 1 diverticulitis   -Advance diet to clear liquids  -Continue IV zosyn  -Pain control PRN  -DVT prophylaxis: lovenox  -Encourage ambulation

## 2017-07-19 NOTE — DISCHARGE NOTE ADULT - PLAN OF CARE
Alleviation of pain and symptoms Follow up: Please call and make an appointment with the Acute Care Surgery Clinic 10-14 days after discharge. Also, please call and make an appointment with your primary care physician as per your usual schedule.   Activity: May return to normal activities as tolerated.  Diet: May continue regular diet - recommend a low fiber diet until acute episode of diverticulitis resolved.  Medications: Please take all home medications as prescribed by your primary care doctor. Pain medication has been prescribed for you. Please, take it as it has been prescribed, do not drive or operate heavy machinery while taking narcotics.  You are encouraged to take over-the-counter tylenol and/or ibuprofen for pain relief when you feel your pain no longer warrants the use of narcotic pain medications, however DO NOT TAKE percocet and tylenol at the same time as they contain the same active ingredient (acetaminophen). Take only percocet OR tylenol.  Wound Care: Please, keep wound site clean and dry. You may shower, but do not bathe  [sutures, staples, special instructions]   If confusion, altered mental status, fever, chest pain, shortness of breath, new or worsening [] pain, vomiting, change or worsening of medical status, please come back to the emergency room, and in case of emergency call 911. Please resume all home blood pressure medications at current doses, monitor blood pressure at home, and follow-up with your cardiologist and/or your primary care provider as per your usual schedule. Follow up: Please call and make an appointment with the Acute Care Surgery Clinic 10-14 days after discharge. Also, please call and make an appointment with your primary care physician as per your usual schedule.   Activity: May return to normal activities as tolerated.  Diet: May continue regular diet - recommend a low fiber diet until acute episode of diverticulitis resolved.  Medications: Please take all home medications as prescribed by your primary care doctor. You may take over-the-counter tylenol and/or ibuprofen for pain relief as needed. You have been sent home on a course of antibiotics - please take as prescribed, do not skip doses. Take with food to avoid stomach upset.  Patient is advised to RETURN TO THE EMERGENCY DEPARTMENT for any of the following - worsening pain, fever/chills, nausea/vomiting, altered mental status, chest pain, shortness of breath, or any other new / worsening symptom.

## 2017-07-19 NOTE — DISCHARGE NOTE ADULT - PROVIDER TOKENS
FREE:[LAST:[[facility]],PHONE:[phone]],FAX:[(   [phone]    -],ADDRESS:[[facility] [place] - [address] [place] [address]]]

## 2017-07-20 DIAGNOSIS — I10 ESSENTIAL (PRIMARY) HYPERTENSION: ICD-10-CM

## 2017-07-20 LAB
ANION GAP SERPL CALC-SCNC: 12 MMOL/L — SIGNIFICANT CHANGE UP (ref 5–17)
BASOPHILS # BLD AUTO: 0 K/UL — SIGNIFICANT CHANGE UP (ref 0–0.2)
BASOPHILS NFR BLD AUTO: 0.1 % — SIGNIFICANT CHANGE UP (ref 0–2)
BUN SERPL-MCNC: 8 MG/DL — SIGNIFICANT CHANGE UP (ref 8–20)
CALCIUM SERPL-MCNC: 8.7 MG/DL — SIGNIFICANT CHANGE UP (ref 8.6–10.2)
CHLORIDE SERPL-SCNC: 101 MMOL/L — SIGNIFICANT CHANGE UP (ref 98–107)
CO2 SERPL-SCNC: 25 MMOL/L — SIGNIFICANT CHANGE UP (ref 22–29)
CREAT SERPL-MCNC: 0.6 MG/DL — SIGNIFICANT CHANGE UP (ref 0.5–1.3)
EOSINOPHIL # BLD AUTO: 0.2 K/UL — SIGNIFICANT CHANGE UP (ref 0–0.5)
EOSINOPHIL NFR BLD AUTO: 2.5 % — SIGNIFICANT CHANGE UP (ref 0–6)
GLUCOSE SERPL-MCNC: 95 MG/DL — SIGNIFICANT CHANGE UP (ref 70–115)
HCT VFR BLD CALC: 29.9 % — LOW (ref 37–47)
HGB BLD-MCNC: 10.3 G/DL — LOW (ref 12–16)
LYMPHOCYTES # BLD AUTO: 0.9 K/UL — LOW (ref 1–4.8)
LYMPHOCYTES # BLD AUTO: 13.6 % — LOW (ref 20–55)
MAGNESIUM SERPL-MCNC: 1.8 MG/DL — SIGNIFICANT CHANGE UP (ref 1.6–2.6)
MCHC RBC-ENTMCNC: 29.9 PG — SIGNIFICANT CHANGE UP (ref 27–31)
MCHC RBC-ENTMCNC: 34.4 G/DL — SIGNIFICANT CHANGE UP (ref 32–36)
MCV RBC AUTO: 86.9 FL — SIGNIFICANT CHANGE UP (ref 81–99)
MONOCYTES # BLD AUTO: 0.5 K/UL — SIGNIFICANT CHANGE UP (ref 0–0.8)
MONOCYTES NFR BLD AUTO: 7.8 % — SIGNIFICANT CHANGE UP (ref 3–10)
NEUTROPHILS # BLD AUTO: 5 K/UL — SIGNIFICANT CHANGE UP (ref 1.8–8)
NEUTROPHILS NFR BLD AUTO: 75.9 % — HIGH (ref 37–73)
PHOSPHATE SERPL-MCNC: 2.5 MG/DL — SIGNIFICANT CHANGE UP (ref 2.4–4.7)
PLATELET # BLD AUTO: 203 K/UL — SIGNIFICANT CHANGE UP (ref 150–400)
POTASSIUM SERPL-MCNC: 3.3 MMOL/L — LOW (ref 3.5–5.3)
POTASSIUM SERPL-SCNC: 3.3 MMOL/L — LOW (ref 3.5–5.3)
RBC # BLD: 3.44 M/UL — LOW (ref 4.4–5.2)
RBC # FLD: 12.8 % — SIGNIFICANT CHANGE UP (ref 11–15.6)
SODIUM SERPL-SCNC: 138 MMOL/L — SIGNIFICANT CHANGE UP (ref 135–145)
WBC # BLD: 6.7 K/UL — SIGNIFICANT CHANGE UP (ref 4.8–10.8)
WBC # FLD AUTO: 6.7 K/UL — SIGNIFICANT CHANGE UP (ref 4.8–10.8)

## 2017-07-20 RX ORDER — POTASSIUM CHLORIDE 20 MEQ
10 PACKET (EA) ORAL
Qty: 0 | Refills: 0 | Status: COMPLETED | OUTPATIENT
Start: 2017-07-20 | End: 2017-07-20

## 2017-07-20 RX ORDER — MAGNESIUM SULFATE 500 MG/ML
2 VIAL (ML) INJECTION ONCE
Qty: 0 | Refills: 0 | Status: COMPLETED | OUTPATIENT
Start: 2017-07-20 | End: 2017-07-20

## 2017-07-20 RX ORDER — POTASSIUM CHLORIDE 20 MEQ
10 PACKET (EA) ORAL ONCE
Qty: 0 | Refills: 0 | Status: DISCONTINUED | OUTPATIENT
Start: 2017-07-20 | End: 2017-07-20

## 2017-07-20 RX ADMIN — Medication 100 MILLIGRAM(S): at 20:54

## 2017-07-20 RX ADMIN — SENNA PLUS 2 TABLET(S): 8.6 TABLET ORAL at 20:55

## 2017-07-20 RX ADMIN — Medication 100 MILLIEQUIVALENT(S): at 15:51

## 2017-07-20 RX ADMIN — Medication 100 MILLIEQUIVALENT(S): at 15:50

## 2017-07-20 RX ADMIN — CARVEDILOL PHOSPHATE 3.12 MILLIGRAM(S): 80 CAPSULE, EXTENDED RELEASE ORAL at 04:57

## 2017-07-20 RX ADMIN — ENOXAPARIN SODIUM 40 MILLIGRAM(S): 100 INJECTION SUBCUTANEOUS at 04:57

## 2017-07-20 RX ADMIN — Medication 100 MILLIGRAM(S): at 04:57

## 2017-07-20 RX ADMIN — LOSARTAN POTASSIUM 25 MILLIGRAM(S): 100 TABLET, FILM COATED ORAL at 23:58

## 2017-07-20 RX ADMIN — CARVEDILOL PHOSPHATE 3.12 MILLIGRAM(S): 80 CAPSULE, EXTENDED RELEASE ORAL at 17:22

## 2017-07-20 RX ADMIN — Medication 1 TABLET(S): at 17:22

## 2017-07-20 RX ADMIN — Medication 50 GRAM(S): at 17:22

## 2017-07-20 RX ADMIN — LOSARTAN POTASSIUM 25 MILLIGRAM(S): 100 TABLET, FILM COATED ORAL at 04:57

## 2017-07-20 RX ADMIN — PIPERACILLIN AND TAZOBACTAM 25 GRAM(S): 4; .5 INJECTION, POWDER, LYOPHILIZED, FOR SOLUTION INTRAVENOUS at 04:56

## 2017-07-20 RX ADMIN — Medication 100 MILLIGRAM(S): at 15:49

## 2017-07-20 NOTE — PROGRESS NOTE ADULT - SUBJECTIVE AND OBJECTIVE BOX
pt seen and examined at bed side.  No acute events overnight.  Pt tolerated clear liquid diet and stated that the pain was getting better    MEDICATIONS  (STANDING):  enoxaparin Injectable 40 milliGRAM(s) SubCutaneous every 24 hours  losartan 25 milliGRAM(s) Oral daily  carvedilol 3.125 milliGRAM(s) Oral every 12 hours  docusate sodium 100 milliGRAM(s) Oral three times a day  senna 2 Tablet(s) Oral at bedtime  amoxicillin  875 milliGRAM(s)/clavulanate 1 Tablet(s) Oral two times a day    MEDICATIONS  (PRN):  ondansetron Injectable 4 milliGRAM(s) IV Push every 6 hours PRN Nausea  acetaminophen   Tablet. 650 milliGRAM(s) Oral every 6 hours PRN Mild Pain (1 - 3)  ibuprofen  Tablet 400 milliGRAM(s) Oral every 4 hours PRN Moderate pain (4-6)      Vital Signs Last 24 Hrs  T(C): 37.1 (20 Jul 2017 19:51), Max: 37.1 (20 Jul 2017 04:40)  T(F): 98.7 (20 Jul 2017 19:51), Max: 98.8 (20 Jul 2017 04:40)  HR: 75 (20 Jul 2017 19:51) (65 - 75)  BP: 123/63 (20 Jul 2017 19:51) (112/59 - 125/69)  BP(mean): --  RR: 18 (20 Jul 2017 19:51) (17 - 21)  SpO2: 98% (20 Jul 2017 19:51) (94% - 98%)    Constitutional: NAD, well-groomed, well-developed  HEENT: PERRLA, EOMI, no drainage or redness  Neck: No bruits; no thyromegaly or nodules,  No JVD  Back: Normal spine flexure, No CVA tenderness, No deformity or limitation of movement  Respiratory: Breath Sounds equal & clear to percussion & auscultation, no accessory muscle use  Cardiovascular: Regular rate & rhythm, normal S1, S2; no murmurs, gallops or rubs; no S3, S4  Gastrointestinal: Soft, minimal tender on the right lower quadrant, no guarding and rigidity  Extremities: No peripheral edema, No cyanosis, clubbing   Vascular: Equal and normal pulses: 2+ peripheral pulses throughout  Neurological: GCS: 15. A&O x 3; no sensory, motor or coordination deficits, normal reflexes  Psychiatric: Normal mood, normal affect  Musculoskeletal: No joint pain, swelling or deformity; no limitation of movement  Skin: No rashes      I&O's Detail    19 Jul 2017 07:01  -  20 Jul 2017 07:00  --------------------------------------------------------  IN:    lactated ringers.: 1236 mL    Oral Fluid: 468 mL    Solution: 50 mL  Total IN: 1754 mL    OUT:  Total OUT: 0 mL    Total NET: 1754 mL      20 Jul 2017 07:01  -  20 Jul 2017 22:38  --------------------------------------------------------  IN:    Oral Fluid: 950 mL    Solution: 300 mL  Total IN: 1250 mL    OUT:  Total OUT: 0 mL    Total NET: 1250 mL          LABS:                        10.3   6.7   )-----------( 203      ( 20 Jul 2017 05:53 )             29.9     07-20    138  |  101  |  8.0  ----------------------------<  95  3.3<L>   |  25.0  |  0.60    Ca    8.7      20 Jul 2017 05:53  Phos  2.5     07-20  Mg     1.8     07-20            RADIOLOGY & ADDITIONAL STUDIES:

## 2017-07-20 NOTE — PROGRESS NOTE ADULT - PROBLEM SELECTOR PLAN 1
iv abx changed to oral abx  diet advanced to regular diet  lovenox for DVT prophylaxis]  Encourage ambulation  serial abd exam

## 2017-07-21 VITALS
OXYGEN SATURATION: 95 % | RESPIRATION RATE: 18 BRPM | HEART RATE: 87 BPM | SYSTOLIC BLOOD PRESSURE: 107 MMHG | DIASTOLIC BLOOD PRESSURE: 66 MMHG

## 2017-07-21 LAB
ANION GAP SERPL CALC-SCNC: 9 MMOL/L — SIGNIFICANT CHANGE UP (ref 5–17)
BUN SERPL-MCNC: 9 MG/DL — SIGNIFICANT CHANGE UP (ref 8–20)
CALCIUM SERPL-MCNC: 9 MG/DL — SIGNIFICANT CHANGE UP (ref 8.6–10.2)
CHLORIDE SERPL-SCNC: 104 MMOL/L — SIGNIFICANT CHANGE UP (ref 98–107)
CO2 SERPL-SCNC: 28 MMOL/L — SIGNIFICANT CHANGE UP (ref 22–29)
CREAT SERPL-MCNC: 0.65 MG/DL — SIGNIFICANT CHANGE UP (ref 0.5–1.3)
GLUCOSE SERPL-MCNC: 111 MG/DL — SIGNIFICANT CHANGE UP (ref 70–115)
HCT VFR BLD CALC: 32 % — LOW (ref 37–47)
HGB BLD-MCNC: 10.7 G/DL — LOW (ref 12–16)
MAGNESIUM SERPL-MCNC: 1.8 MG/DL — SIGNIFICANT CHANGE UP (ref 1.8–2.6)
MCHC RBC-ENTMCNC: 30 PG — SIGNIFICANT CHANGE UP (ref 27–31)
MCHC RBC-ENTMCNC: 33.4 G/DL — SIGNIFICANT CHANGE UP (ref 32–36)
MCV RBC AUTO: 89.6 FL — SIGNIFICANT CHANGE UP (ref 81–99)
PHOSPHATE SERPL-MCNC: 2.1 MG/DL — LOW (ref 2.4–4.7)
PLATELET # BLD AUTO: 247 K/UL — SIGNIFICANT CHANGE UP (ref 150–400)
POTASSIUM SERPL-MCNC: 3.9 MMOL/L — SIGNIFICANT CHANGE UP (ref 3.5–5.3)
POTASSIUM SERPL-SCNC: 3.9 MMOL/L — SIGNIFICANT CHANGE UP (ref 3.5–5.3)
RBC # BLD: 3.57 M/UL — LOW (ref 4.4–5.2)
RBC # FLD: 12.7 % — SIGNIFICANT CHANGE UP (ref 11–15.6)
SODIUM SERPL-SCNC: 141 MMOL/L — SIGNIFICANT CHANGE UP (ref 135–145)
WBC # BLD: 8.3 K/UL — SIGNIFICANT CHANGE UP (ref 4.8–10.8)
WBC # FLD AUTO: 8.3 K/UL — SIGNIFICANT CHANGE UP (ref 4.8–10.8)

## 2017-07-21 RX ORDER — NITROFURANTOIN MACROCRYSTAL 50 MG
1 CAPSULE ORAL
Qty: 0 | Refills: 0 | COMMUNITY

## 2017-07-21 RX ORDER — SODIUM,POTASSIUM PHOSPHATES 278-250MG
1 POWDER IN PACKET (EA) ORAL
Qty: 0 | Refills: 0 | Status: COMPLETED | OUTPATIENT
Start: 2017-07-21 | End: 2017-07-21

## 2017-07-21 RX ORDER — DOCUSATE SODIUM 100 MG
1 CAPSULE ORAL
Qty: 0 | Refills: 0 | COMMUNITY
Start: 2017-07-21

## 2017-07-21 RX ORDER — ACETAMINOPHEN 500 MG
2 TABLET ORAL
Qty: 0 | Refills: 0 | COMMUNITY
Start: 2017-07-21

## 2017-07-21 RX ORDER — SENNA PLUS 8.6 MG/1
2 TABLET ORAL
Qty: 0 | Refills: 0 | COMMUNITY
Start: 2017-07-21

## 2017-07-21 RX ADMIN — Medication 100 MILLIGRAM(S): at 05:06

## 2017-07-21 RX ADMIN — Medication 1 TABLET(S): at 05:06

## 2017-07-21 RX ADMIN — Medication 1 TABLET(S): at 12:24

## 2017-07-21 RX ADMIN — Medication 100 MILLIGRAM(S): at 12:25

## 2017-07-21 RX ADMIN — CARVEDILOL PHOSPHATE 3.12 MILLIGRAM(S): 80 CAPSULE, EXTENDED RELEASE ORAL at 05:06

## 2017-07-21 NOTE — PROGRESS NOTE ADULT - SUBJECTIVE AND OBJECTIVE BOX
INTERVAL HPI/OVERNIGHT EVENTS/SUBJECTIVE:  Patient doing well. on Abx. Complains of gas pain. Tolerating diet. BM. Ambulating       Vital Signs Last 24 Hrs  T(C): 37.3 (21 Jul 2017 04:47), Max: 37.3 (21 Jul 2017 04:47)  T(F): 99.1 (21 Jul 2017 04:47), Max: 99.1 (21 Jul 2017 04:47)  HR: 75 (21 Jul 2017 04:47) (65 - 75)  BP: 144/72 (21 Jul 2017 04:47) (118/61 - 144/72)  BP(mean): --  ABP: --  ABP(mean): --  RR: 17 (21 Jul 2017 04:47) (16 - 21)  SpO2: 94% (21 Jul 2017 04:47) (94% - 98%)      I&O's Detail    20 Jul 2017 07:01  -  21 Jul 2017 07:00  --------------------------------------------------------  IN:    Oral Fluid: 950 mL    Solution: 300 mL  Total IN: 1250 mL    OUT:  Total OUT: 0 mL    Total NET: 1250 mL    MEDICATIONS  (STANDING):  enoxaparin Injectable 40 milliGRAM(s) SubCutaneous every 24 hours  losartan 25 milliGRAM(s) Oral daily  carvedilol 3.125 milliGRAM(s) Oral every 12 hours  docusate sodium 100 milliGRAM(s) Oral three times a day  senna 2 Tablet(s) Oral at bedtime  amoxicillin  875 milliGRAM(s)/clavulanate 1 Tablet(s) Oral two times a day  potassium acid phosphate/sodium acid phosphate tablet (K-PHOS No. 2) 1 Tablet(s) Oral four times a day with meals    MEDICATIONS  (PRN):  ondansetron Injectable 4 milliGRAM(s) IV Push every 6 hours PRN Nausea  acetaminophen   Tablet. 650 milliGRAM(s) Oral every 6 hours PRN Mild Pain (1 - 3)  ibuprofen  Tablet 400 milliGRAM(s) Oral every 4 hours PRN Moderate pain (4-6)    NUTRITION/IVF: Regular/ IVL         MISC:     PHYSICAL EXAM:    Gen: well appearing female in NAD    Eyes: PERRLA    Neurological: GCS 15, a&o    Pulmonary: unlabored    Cardiovascular: NSR    Gastrointestinal: Soft ND NT, no guarding or rebound.     Genitourinary: voiding    Extremities: skin intact, no c/c/e      LABS:  CBC Full  -  ( 21 Jul 2017 05:48 )  WBC Count : 8.3 K/uL  Hemoglobin : 10.7 g/dL  Hematocrit : 32.0 %  Platelet Count - Automated : 247 K/uL  Mean Cell Volume : 89.6 fl  Mean Cell Hemoglobin : 30.0 pg  Mean Cell Hemoglobin Concentration : 33.4 g/dL  Auto Neutrophil # : x  Auto Lymphocyte # : x  Auto Monocyte # : x  Auto Eosinophil # : x  Auto Basophil # : x  Auto Neutrophil % : x  Auto Lymphocyte % : x  Auto Monocyte % : x  Auto Eosinophil % : x  Auto Basophil % : x    07-21    141  |  104  |  9.0  ----------------------------<  111  3.9   |  28.0  |  0.65    Ca    9.0      21 Jul 2017 05:48  Phos  2.1     07-21  Mg     1.8     07-21    RECENT CULTURES:    CAPILLARY BLOOD GLUCOSE      RADIOLOGY & ADDITIONAL STUDIES:    ASSESSMENT/PLAN:  77yFemale presenting with H[place]hey 1 diverticulitis  - ambulate  -diet as tolerated  -augmentin to complete 10 day abx course  -d/c home.

## 2017-07-21 NOTE — PROGRESS NOTE ADULT - ATTENDING COMMENTS
avss  abd soft ntnd  labs reviewed  tolerated po regular diet, and po augmentin  will d/c home today with 6 d of augmentin to complete a 10 day course for diverticulitis. f/u in [facility] clinic next week.

## 2017-07-22 ENCOUNTER — EMERGENCY (EMERGENCY)
Facility: HOSPITAL | Age: 77
LOS: 1 days | Discharge: HOME | End: 2017-07-22
Attending: EMERGENCY MEDICINE
Payer: MEDICARE

## 2017-07-22 VITALS
TEMPERATURE: 100 F | HEART RATE: 88 BPM | RESPIRATION RATE: 20 BRPM | OXYGEN SATURATION: 98 % | SYSTOLIC BLOOD PRESSURE: 138 MMHG | DIASTOLIC BLOOD PRESSURE: 70 MMHG

## 2017-07-22 VITALS — WEIGHT: 139.99 LBS | HEIGHT: 63 IN

## 2017-07-22 DIAGNOSIS — Z87.898 PERSONAL HISTORY OF OTHER SPECIFIED CONDITIONS: Chronic | ICD-10-CM

## 2017-07-22 LAB
ALBUMIN SERPL ELPH-MCNC: 3.2 G/DL — LOW (ref 3.3–5.2)
ALP SERPL-CCNC: 65 U/L — SIGNIFICANT CHANGE UP (ref 40–120)
ALT FLD-CCNC: 19 U/L — SIGNIFICANT CHANGE UP
ANION GAP SERPL CALC-SCNC: 10 MMOL/L — SIGNIFICANT CHANGE UP (ref 5–17)
AST SERPL-CCNC: 26 U/L — SIGNIFICANT CHANGE UP
BASOPHILS # BLD AUTO: 0 K/UL — SIGNIFICANT CHANGE UP (ref 0–0.2)
BASOPHILS NFR BLD AUTO: 0 % — SIGNIFICANT CHANGE UP (ref 0–2)
BILIRUB SERPL-MCNC: 0.2 MG/DL — LOW (ref 0.4–2)
BUN SERPL-MCNC: 7 MG/DL — LOW (ref 8–20)
CALCIUM SERPL-MCNC: 9.7 MG/DL — SIGNIFICANT CHANGE UP (ref 8.6–10.2)
CHLORIDE SERPL-SCNC: 100 MMOL/L — SIGNIFICANT CHANGE UP (ref 98–107)
CO2 SERPL-SCNC: 29 MMOL/L — SIGNIFICANT CHANGE UP (ref 22–29)
CREAT SERPL-MCNC: 0.59 MG/DL — SIGNIFICANT CHANGE UP (ref 0.5–1.3)
EOSINOPHIL # BLD AUTO: 0.6 K/UL — HIGH (ref 0–0.5)
EOSINOPHIL NFR BLD AUTO: 6 % — HIGH (ref 0–5)
GLUCOSE SERPL-MCNC: 115 MG/DL — SIGNIFICANT CHANGE UP (ref 70–115)
HCT VFR BLD CALC: 34.1 % — LOW (ref 37–47)
HGB BLD-MCNC: 11.4 G/DL — LOW (ref 12–16)
LYMPHOCYTES # BLD AUTO: 1.5 K/UL — SIGNIFICANT CHANGE UP (ref 1–4.8)
LYMPHOCYTES # BLD AUTO: 13 % — LOW (ref 20–55)
MCHC RBC-ENTMCNC: 29.8 PG — SIGNIFICANT CHANGE UP (ref 27–31)
MCHC RBC-ENTMCNC: 33.4 G/DL — SIGNIFICANT CHANGE UP (ref 32–36)
MCV RBC AUTO: 89.3 FL — SIGNIFICANT CHANGE UP (ref 81–99)
MONOCYTES # BLD AUTO: 0.6 K/UL — SIGNIFICANT CHANGE UP (ref 0–0.8)
MONOCYTES NFR BLD AUTO: 6 % — SIGNIFICANT CHANGE UP (ref 3–10)
NEUTROPHILS # BLD AUTO: 7.1 K/UL — SIGNIFICANT CHANGE UP (ref 1.8–8)
NEUTROPHILS NFR BLD AUTO: 75 % — HIGH (ref 37–73)
NT-PROBNP SERPL-SCNC: 582 PG/ML — HIGH (ref 0–300)
PLATELET # BLD AUTO: 303 K/UL — SIGNIFICANT CHANGE UP (ref 150–400)
POTASSIUM SERPL-MCNC: 3.6 MMOL/L — SIGNIFICANT CHANGE UP (ref 3.5–5.3)
POTASSIUM SERPL-SCNC: 3.6 MMOL/L — SIGNIFICANT CHANGE UP (ref 3.5–5.3)
PROT SERPL-MCNC: 6.3 G/DL — LOW (ref 6.6–8.7)
RBC # BLD: 3.82 M/UL — LOW (ref 4.4–5.2)
RBC # FLD: 13 % — SIGNIFICANT CHANGE UP (ref 11–15.6)
SODIUM SERPL-SCNC: 139 MMOL/L — SIGNIFICANT CHANGE UP (ref 135–145)
WBC # BLD: 9.9 K/UL — SIGNIFICANT CHANGE UP (ref 4.8–10.8)
WBC # FLD AUTO: 9.9 K/UL — SIGNIFICANT CHANGE UP (ref 4.8–10.8)

## 2017-07-22 PROCEDURE — 93970 EXTREMITY STUDY: CPT | Mod: 26

## 2017-07-22 PROCEDURE — 71020: CPT | Mod: 26

## 2017-07-22 PROCEDURE — 99284 EMERGENCY DEPT VISIT MOD MDM: CPT

## 2017-07-22 NOTE — ED STATDOCS - MUSCULOSKELETAL, MLM
Legs are firm and tense. No calf tenderness. 1+ pitting edema bilaterally. No signs of trauma in lower extremities.

## 2017-07-22 NOTE — ED ADULT TRIAGE NOTE - CHIEF COMPLAINT QUOTE
pt reports swelling to both ankles x 1 day. pt denies sob and chest pain. pt was discharged from [facility] yesterday s/p diverticulosis. pt is febrile at triage

## 2017-07-22 NOTE — ED ADULT NURSE NOTE - OBJECTIVE STATEMENT
patient recently dc from [facility] and today c/o swelling of b/l ankles, denies any pain or trauma, sob, cp.

## 2017-07-22 NOTE — ED STATDOCS - OBJECTIVE STATEMENT
78 y/o F pt with PMHx of HTN and AICD presents to ED c/o bilateral leg swelling and fever x1 day. Pt reports she was discharged from the ED yesterday. She was admitted for perforated diverticulitis. She states that she was laying in bed for a few days while at the hospital but began to walk for the last 3 days. Pt notes that she noticed swelling in the shower last night but woke up this am with worsened swelling to both ankles. She is not currently on blood thinners. Pt denies CP, SOB, HUTSON, nausea, vomiting, abdominal pain, calf pain, back pain, numbness, and tingling. No further complaints at this time. Allergy to Cipro, Keflex, IV contrast, and codeine.  Cardiologist: Dr. Antonio Willett

## 2017-07-22 NOTE — ED STATDOCS - PROGRESS NOTE DETAILS
Pt resting comfortably, vss and in nad at this time. Pt denies c/p, sob and has no other complaints. Pts labs wnl with mild elevation of pro-bnp. Pts CXR discussed with radiologist at [place] -- shows free air under the diaphragm (as expected with hx of perforated diverticulitis last week), otherwise wnl. Pts u/s negative for acute DVT. D/w Dr. Bennett-- pt is stable for d/c with outpt pcp/cardiologist f/u. Pt reports moderate relief of swelling and states she is beginning to be able to see her ankles. Pt resting comfortably, vss and in nad at this time. Pt denies c/p, sob and has no other complaints. Pts labs wnl with mild elevation of pro-bnp. Pts CXR discussed with radiologist at [place] -- shows free air under the diaphragm (as expected with hx of perforated diverticulitis last week), otherwise wnl. Pts u/s negative for acute DVT. D/w Dr. Bennett-- pt is stable for d/c with outpt pcp/cardiologist f/u.

## 2017-07-22 NOTE — ED STATDOCS - ATTENDING CONTRIBUTION TO CARE
I, Lilia Bennett, performed the initial face to face bedside interview with this patient regarding history of present illness, review of symptoms and relevant past medical, social and family history.  I completed an independent physical examination.  I was the initial provider who evaluated this patient.  The history, relevant review of systems, past medical and surgical history, medical decision making, and physical examination was documented by the scribe in my presence and I attest to the accuracy of the documentation.  I have signed out the follow up of any pending tests (i.e. labs, radiological studies) to the ACP.  I have communicated the patient’s plan of care and disposition with the ACP.

## 2017-07-22 NOTE — ED ADULT NURSE NOTE - CHIEF COMPLAINT QUOTE
pt reports swelling to both ankles x 1 day. pt denies sob and chest pain. pt was discharged from [facility] yesterday s/p diverticulosis. pt is febrile at triage

## 2017-07-23 PROBLEM — I10 ESSENTIAL (PRIMARY) HYPERTENSION: Chronic | Status: ACTIVE | Noted: 2017-07-17

## 2017-07-25 ENCOUNTER — INPATIENT (INPATIENT)
Facility: HOSPITAL | Age: 77
LOS: 6 days | Discharge: NURSING, REHAB OR LONG-TERM CARE | DRG: 330 | End: 2017-08-01
Attending: SURGERY | Admitting: SURGERY
Payer: MEDICARE

## 2017-07-25 VITALS
TEMPERATURE: 98 F | SYSTOLIC BLOOD PRESSURE: 128 MMHG | RESPIRATION RATE: 20 BRPM | OXYGEN SATURATION: 96 % | DIASTOLIC BLOOD PRESSURE: 77 MMHG | WEIGHT: 139.99 LBS | HEART RATE: 88 BPM

## 2017-07-25 DIAGNOSIS — Z87.898 PERSONAL HISTORY OF OTHER SPECIFIED CONDITIONS: Chronic | ICD-10-CM

## 2017-07-25 DIAGNOSIS — K57.80 DIVERTICULITIS OF INTESTINE, PART UNSPECIFIED, WITH PERFORATION AND ABSCESS WITHOUT BLEEDING: ICD-10-CM

## 2017-07-25 LAB
ALBUMIN SERPL ELPH-MCNC: 3.6 G/DL — SIGNIFICANT CHANGE UP (ref 3.3–5.2)
ALP SERPL-CCNC: 63 U/L — SIGNIFICANT CHANGE UP (ref 40–120)
ALT FLD-CCNC: 14 U/L — SIGNIFICANT CHANGE UP
ANION GAP SERPL CALC-SCNC: 13 MMOL/L — SIGNIFICANT CHANGE UP (ref 5–17)
APPEARANCE UR: CLEAR — SIGNIFICANT CHANGE UP
AST SERPL-CCNC: 19 U/L — SIGNIFICANT CHANGE UP
BACTERIA # UR AUTO: ABNORMAL
BASOPHILS # BLD AUTO: 0 K/UL — SIGNIFICANT CHANGE UP (ref 0–0.2)
BASOPHILS NFR BLD AUTO: 0.1 % — SIGNIFICANT CHANGE UP (ref 0–2)
BILIRUB SERPL-MCNC: 0.4 MG/DL — SIGNIFICANT CHANGE UP (ref 0.4–2)
BILIRUB UR-MCNC: NEGATIVE — SIGNIFICANT CHANGE UP
BUN SERPL-MCNC: 10 MG/DL — SIGNIFICANT CHANGE UP (ref 8–20)
CALCIUM SERPL-MCNC: 10.2 MG/DL — SIGNIFICANT CHANGE UP (ref 8.6–10.2)
CHLORIDE SERPL-SCNC: 94 MMOL/L — LOW (ref 98–107)
CO2 SERPL-SCNC: 32 MMOL/L — HIGH (ref 22–29)
COLOR SPEC: YELLOW — SIGNIFICANT CHANGE UP
CREAT SERPL-MCNC: 0.65 MG/DL — SIGNIFICANT CHANGE UP (ref 0.5–1.3)
DIFF PNL FLD: ABNORMAL
EOSINOPHIL # BLD AUTO: 0.1 K/UL — SIGNIFICANT CHANGE UP (ref 0–0.5)
EOSINOPHIL NFR BLD AUTO: 0.4 % — SIGNIFICANT CHANGE UP (ref 0–6)
EPI CELLS # UR: SIGNIFICANT CHANGE UP
GLUCOSE SERPL-MCNC: 120 MG/DL — HIGH (ref 70–115)
GLUCOSE UR QL: NEGATIVE MG/DL — SIGNIFICANT CHANGE UP
HCT VFR BLD CALC: 35 % — LOW (ref 37–47)
HGB BLD-MCNC: 11.6 G/DL — LOW (ref 12–16)
KETONES UR-MCNC: ABNORMAL
LEUKOCYTE ESTERASE UR-ACNC: ABNORMAL
LIDOCAIN IGE QN: 24 U/L — SIGNIFICANT CHANGE UP (ref 22–51)
LYMPHOCYTES # BLD AUTO: 1.2 K/UL — SIGNIFICANT CHANGE UP (ref 1–4.8)
LYMPHOCYTES # BLD AUTO: 8.1 % — LOW (ref 20–55)
MCHC RBC-ENTMCNC: 29.2 PG — SIGNIFICANT CHANGE UP (ref 27–31)
MCHC RBC-ENTMCNC: 33.1 G/DL — SIGNIFICANT CHANGE UP (ref 32–36)
MCV RBC AUTO: 88.2 FL — SIGNIFICANT CHANGE UP (ref 81–99)
MONOCYTES # BLD AUTO: 0.5 K/UL — SIGNIFICANT CHANGE UP (ref 0–0.8)
MONOCYTES NFR BLD AUTO: 3.5 % — SIGNIFICANT CHANGE UP (ref 3–10)
NEUTROPHILS # BLD AUTO: 12.6 K/UL — HIGH (ref 1.8–8)
NEUTROPHILS NFR BLD AUTO: 87.6 % — HIGH (ref 37–73)
NITRITE UR-MCNC: NEGATIVE — SIGNIFICANT CHANGE UP
PH UR: 6 — SIGNIFICANT CHANGE UP (ref 5–8)
PLATELET # BLD AUTO: 387 K/UL — SIGNIFICANT CHANGE UP (ref 150–400)
POTASSIUM SERPL-MCNC: 3.8 MMOL/L — SIGNIFICANT CHANGE UP (ref 3.5–5.3)
POTASSIUM SERPL-SCNC: 3.8 MMOL/L — SIGNIFICANT CHANGE UP (ref 3.5–5.3)
PROT SERPL-MCNC: 6.9 G/DL — SIGNIFICANT CHANGE UP (ref 6.6–8.7)
PROT UR-MCNC: 15 MG/DL
RBC # BLD: 3.97 M/UL — LOW (ref 4.4–5.2)
RBC # FLD: 12.7 % — SIGNIFICANT CHANGE UP (ref 11–15.6)
RBC CASTS # UR COMP ASSIST: ABNORMAL /HPF (ref 0–4)
SODIUM SERPL-SCNC: 139 MMOL/L — SIGNIFICANT CHANGE UP (ref 135–145)
SP GR SPEC: 1.02 — SIGNIFICANT CHANGE UP (ref 1.01–1.02)
UROBILINOGEN FLD QL: NEGATIVE MG/DL — SIGNIFICANT CHANGE UP
WBC # BLD: 14.4 K/UL — HIGH (ref 4.8–10.8)
WBC # FLD AUTO: 14.4 K/UL — HIGH (ref 4.8–10.8)
WBC UR QL: SIGNIFICANT CHANGE UP

## 2017-07-25 PROCEDURE — 71010: CPT | Mod: 26

## 2017-07-25 PROCEDURE — 99222 1ST HOSP IP/OBS MODERATE 55: CPT | Mod: AI

## 2017-07-25 PROCEDURE — 74176 CT ABD & PELVIS W/O CONTRAST: CPT | Mod: 26

## 2017-07-25 PROCEDURE — 99285 EMERGENCY DEPT VISIT HI MDM: CPT

## 2017-07-25 PROCEDURE — 74020: CPT | Mod: 26

## 2017-07-25 RX ORDER — MEROPENEM 1 G/30ML
1000 INJECTION INTRAVENOUS EVERY 8 HOURS
Qty: 0 | Refills: 0 | Status: DISCONTINUED | OUTPATIENT
Start: 2017-07-26 | End: 2017-07-27

## 2017-07-25 RX ORDER — ONDANSETRON 8 MG/1
4 TABLET, FILM COATED ORAL ONCE
Qty: 0 | Refills: 0 | Status: COMPLETED | OUTPATIENT
Start: 2017-07-25 | End: 2017-07-25

## 2017-07-25 RX ORDER — CARVEDILOL PHOSPHATE 80 MG/1
3.12 CAPSULE, EXTENDED RELEASE ORAL EVERY 12 HOURS
Qty: 0 | Refills: 0 | Status: DISCONTINUED | OUTPATIENT
Start: 2017-07-25 | End: 2017-07-26

## 2017-07-25 RX ORDER — MEROPENEM 1 G/30ML
INJECTION INTRAVENOUS
Qty: 0 | Refills: 0 | Status: DISCONTINUED | OUTPATIENT
Start: 2017-07-25 | End: 2017-07-27

## 2017-07-25 RX ORDER — LOSARTAN POTASSIUM 100 MG/1
50 TABLET, FILM COATED ORAL DAILY
Qty: 0 | Refills: 0 | Status: DISCONTINUED | OUTPATIENT
Start: 2017-07-25 | End: 2017-07-27

## 2017-07-25 RX ORDER — SODIUM CHLORIDE 9 MG/ML
500 INJECTION INTRAMUSCULAR; INTRAVENOUS; SUBCUTANEOUS ONCE
Qty: 0 | Refills: 0 | Status: DISCONTINUED | OUTPATIENT
Start: 2017-07-25 | End: 2017-07-25

## 2017-07-25 RX ORDER — MEROPENEM 1 G/30ML
1000 INJECTION INTRAVENOUS EVERY 8 HOURS
Qty: 0 | Refills: 0 | Status: DISCONTINUED | OUTPATIENT
Start: 2017-07-25 | End: 2017-07-25

## 2017-07-25 RX ORDER — SODIUM CHLORIDE 9 MG/ML
1000 INJECTION, SOLUTION INTRAVENOUS
Qty: 0 | Refills: 0 | Status: DISCONTINUED | OUTPATIENT
Start: 2017-07-25 | End: 2017-07-27

## 2017-07-25 RX ORDER — SODIUM CHLORIDE 9 MG/ML
500 INJECTION INTRAMUSCULAR; INTRAVENOUS; SUBCUTANEOUS ONCE
Qty: 0 | Refills: 0 | Status: COMPLETED | OUTPATIENT
Start: 2017-07-25 | End: 2017-07-25

## 2017-07-25 RX ORDER — SODIUM CHLORIDE 9 MG/ML
3 INJECTION INTRAMUSCULAR; INTRAVENOUS; SUBCUTANEOUS ONCE
Qty: 0 | Refills: 0 | Status: COMPLETED | OUTPATIENT
Start: 2017-07-25 | End: 2017-07-25

## 2017-07-25 RX ORDER — MEROPENEM 1 G/30ML
1000 INJECTION INTRAVENOUS ONCE
Qty: 0 | Refills: 0 | Status: COMPLETED | OUTPATIENT
Start: 2017-07-25 | End: 2017-07-25

## 2017-07-25 RX ORDER — ENOXAPARIN SODIUM 100 MG/ML
40 INJECTION SUBCUTANEOUS DAILY
Qty: 0 | Refills: 0 | Status: DISCONTINUED | OUTPATIENT
Start: 2017-07-25 | End: 2017-07-27

## 2017-07-25 RX ORDER — VANCOMYCIN HCL 1 G
1000 VIAL (EA) INTRAVENOUS ONCE
Qty: 0 | Refills: 0 | Status: COMPLETED | OUTPATIENT
Start: 2017-07-25 | End: 2017-07-25

## 2017-07-25 RX ADMIN — SODIUM CHLORIDE 500 MILLILITER(S): 9 INJECTION INTRAMUSCULAR; INTRAVENOUS; SUBCUTANEOUS at 11:42

## 2017-07-25 RX ADMIN — Medication 250 MILLIGRAM(S): at 22:00

## 2017-07-25 RX ADMIN — SODIUM CHLORIDE 3 MILLILITER(S): 9 INJECTION INTRAMUSCULAR; INTRAVENOUS; SUBCUTANEOUS at 11:24

## 2017-07-25 RX ADMIN — ONDANSETRON 4 MILLIGRAM(S): 8 TABLET, FILM COATED ORAL at 11:24

## 2017-07-25 RX ADMIN — MEROPENEM 200 MILLIGRAM(S): 1 INJECTION INTRAVENOUS at 18:55

## 2017-07-25 NOTE — ED PROVIDER NOTE - OBJECTIVE STATEMENT
77 year old female with a h/o9 htn presents with generalized weakness. pt was recently admitted for diverticulitis and discharged a few days ago and was discharged on Augmentin . she was in her usoh until one day ago when she developed nausea and vomiting. she denies any diarrhes, fever or chills

## 2017-07-25 NOTE — ED ADULT NURSE NOTE - OBJECTIVE STATEMENT
Patient reports nausea and vomiting since last night. Last BM last night, passing flatulence. Body aches. abdomen soft and nontender

## 2017-07-25 NOTE — H&P ADULT - ASSESSMENT
78 y/o F w/ perforated sigmoid diverticulitis with abscess measuring 4.8 cm secondary to failed medical therapy

## 2017-07-25 NOTE — H&P ADULT - PROBLEM SELECTOR PLAN 1
Admit to [facility] 3T monitored  Neuro: Morphine prn  Cardio: Reconcile home medications, EKG, cardiology consult for risk stratification for operative procedure. Monitor HR and BP.  Pulm: Pulse ox with vital signs q 4 hours  Gastro: NPO, IVFs  ID: Meropenem  DVT: lovenox 40 mg Daily, SCDs  Will consult IR for recommendation on possible window for abscess drainage  If IR is unable to drain abscess, pt will require operative intervention that includes bowel resection, and possible creation of osotomy    Case discussed with Dr. Lopez Admit to ACS 3T monitored  Neuro: Morphine prn  Cardio: Reconcile home medications, EKG, cardiology consult for risk stratification for operative procedure. Monitor HR and BP.  Pulm: Pulse ox with vital signs q 4 hours  Gastro: NPO, IVFs  ID: Meropenem  DVT: lovenox 40 mg Daily, SCDs  Will consult IR for recommendation on possible window for abscess drainage  If IR is unable to drain abscess, pt will require operative intervention that includes bowel resection, and possible creation of ostomy    Case discussed with Dr. Lopez

## 2017-07-25 NOTE — H&P ADULT - ATTENDING COMMENTS
76 yo female well known to the ACS service.  Admitted on 7/17/17 2/2 perforated diverticulitis.  Managed conservatively w/ IV abx and improved.  Discharged on 7/21/17 w/ oral abx.  Patient came to ED on 7/22/17 2/2 concerns over LE swelling.  This improved and she was discharged home.  She then developed po intolerance yesterday.  States that she has been having eructations, nausea and vomiting.  Denies abdominal pain.  Having BM and flatus.  Denies fever/chills.  Afebrile.  HD normal.  WBC: 14.4K; PMNs: 87.6%.  Abdomen: S/NT/ND, no rebound, no guarding, no peritonitis.  CT A/P shows perforated diverticulitis w/ approximately 5cm air filled abscess and some air under the right hemidiaphragm (was also present on prior CT A/P). Advised patient that I did not think the abscess would be amenable to IR drainage given it's location.  Also, this is a failure of medical therapy and have told patient and her 2 daughters that she will need a sigmoidectomy w/ end colostomy.  Admit.  NPO/IVF/IV abx (started on Meropenem in ED).  Serial abdominal exams.  Cardiology for risk stratification.  Have told patient that should her clinical exam or hemodynamics worsen then she will require urgent OR.

## 2017-07-25 NOTE — ED STATDOCS - PROGRESS NOTE DETAILS
77 year old female, with hx of diverticulitis and breast CA, presenting to the ED complaining of nausea and vomiting x 3-4 episodes last night. Pt states that she was admitted here in the hospital 1 week ago for diverticulitis. She states that she also feels associated right lower abdominal pain. Pt states that her BM are normal, and she denies having any fever, chills, or difficulty urination. She states that she is a former smoker. Will transfer to the main ED for further evaluation by another provider.   PMD: Dr. Vazquez  Cardiologist: Dr. Rose 77 year old female, with hx of diverticulitis and breast CA, presenting to the ED complaining of nausea and vomiting x 3-4 episodes last night. Pt states that she was admitted here in the hospital 1 week ago for diverticulitis and was also recently in [facility] ED for swelling to her lower extremities. She states that she also feels associated right lower abdominal pain. Pt states that her BM are normal, and she denies having any fever, chills, or difficulty urination. She states that she is a former smoker. Will transfer to the main ED for further evaluation by another provider.   PMD: Dr. Vazquez  Cardiologist: Dr. Rose

## 2017-07-25 NOTE — ED PROVIDER NOTE - SECONDARY DIAGNOSIS.
Diverticulitis of intestine with perforation without bleeding, unspecified part of intestinal tract Small bowel obstruction

## 2017-07-25 NOTE — ED PROVIDER NOTE - CARE PLAN
Principal Discharge DX:	Diverticulitis of intestine with abscess without bleeding, unspecified part of intestinal tract  Secondary Diagnosis:	Diverticulitis of intestine with perforation without bleeding, unspecified part of intestinal tract  Secondary Diagnosis:	Small bowel obstruction

## 2017-07-25 NOTE — H&P ADULT - HISTORY OF PRESENT ILLNESS
78 y/o F w/ PMH pacemaker (recently interrogated by Cardiologist from [facility] on 07/20) well known to ACS service comes to the ED w/ c/o of PO intolerance since yesterday. She was recently discharged for acute diverticulitis with microperforation of the sigmoid colon on PO Augmentin. She endorses that she has been vomiting everything she has been eating. She denies abdominal pain, last bowel movement was this morning as well as flatus. She denies fevers, chills, chest pain, palpitations, diarrhea, dysuria, dizziness or SOB.     Allergies: Ciprofloxacin Keflex, codeine, IV contrast    PMH:   Breast CA 17 years ago  HTN  Pacemaker    PSH:  open appendectomy  Cholecystectomy  Bilateral oophorectomy 76 y/o F w/ PMH pacemaker (recently interrogated by Cardiologist from [facility] on 07/20) well known to ACS service comes to the ED w/ c/o of PO intolerance since yesterday. She was recently discharged for acute diverticulitis with microperforation of the sigmoid colon on PO Augmentin. She reports that she has been vomiting everything she has been eating. She denies abdominal pain, last bowel movement was this morning as well as flatus. She denies fevers, chills, chest pain, palpitations, diarrhea, dysuria, dizziness or SOB.     Allergies: Ciprofloxacin Keflex, codeine, IV contrast    PMH:   Breast CA 17 years ago  HTN  Pacemaker    PSH:  open appendectomy  Cholecystectomy  Bilateral oophorectomy

## 2017-07-26 LAB
ANION GAP SERPL CALC-SCNC: 11 MMOL/L — SIGNIFICANT CHANGE UP (ref 5–17)
APTT BLD: 26.7 SEC — LOW (ref 27.5–37.4)
BASOPHILS # BLD AUTO: 0 K/UL — SIGNIFICANT CHANGE UP (ref 0–0.2)
BASOPHILS NFR BLD AUTO: 0.1 % — SIGNIFICANT CHANGE UP (ref 0–2)
BLD GP AB SCN SERPL QL: SIGNIFICANT CHANGE UP
BUN SERPL-MCNC: 15 MG/DL — SIGNIFICANT CHANGE UP (ref 8–20)
CALCIUM SERPL-MCNC: 9.6 MG/DL — SIGNIFICANT CHANGE UP (ref 8.6–10.2)
CHLORIDE SERPL-SCNC: 96 MMOL/L — LOW (ref 98–107)
CO2 SERPL-SCNC: 31 MMOL/L — HIGH (ref 22–29)
CREAT SERPL-MCNC: 0.57 MG/DL — SIGNIFICANT CHANGE UP (ref 0.5–1.3)
CULTURE RESULTS: SIGNIFICANT CHANGE UP
EOSINOPHIL # BLD AUTO: 0 K/UL — SIGNIFICANT CHANGE UP (ref 0–0.5)
EOSINOPHIL NFR BLD AUTO: 0.3 % — SIGNIFICANT CHANGE UP (ref 0–6)
GLUCOSE SERPL-MCNC: 119 MG/DL — HIGH (ref 70–115)
HCT VFR BLD CALC: 33.5 % — LOW (ref 37–47)
HGB BLD-MCNC: 11.2 G/DL — LOW (ref 12–16)
INR BLD: 1.18 RATIO — HIGH (ref 0.88–1.16)
LYMPHOCYTES # BLD AUTO: 1.1 K/UL — SIGNIFICANT CHANGE UP (ref 1–4.8)
LYMPHOCYTES # BLD AUTO: 7.4 % — LOW (ref 20–55)
MAGNESIUM SERPL-MCNC: 2.1 MG/DL — SIGNIFICANT CHANGE UP (ref 1.6–2.6)
MCHC RBC-ENTMCNC: 29.3 PG — SIGNIFICANT CHANGE UP (ref 27–31)
MCHC RBC-ENTMCNC: 33.4 G/DL — SIGNIFICANT CHANGE UP (ref 32–36)
MCV RBC AUTO: 87.7 FL — SIGNIFICANT CHANGE UP (ref 81–99)
MONOCYTES # BLD AUTO: 0.5 K/UL — SIGNIFICANT CHANGE UP (ref 0–0.8)
MONOCYTES NFR BLD AUTO: 3.6 % — SIGNIFICANT CHANGE UP (ref 3–10)
NEUTROPHILS # BLD AUTO: 12.8 K/UL — HIGH (ref 1.8–8)
NEUTROPHILS NFR BLD AUTO: 88.2 % — HIGH (ref 37–73)
PHOSPHATE SERPL-MCNC: 2.7 MG/DL — SIGNIFICANT CHANGE UP (ref 2.4–4.7)
PLATELET # BLD AUTO: 386 K/UL — SIGNIFICANT CHANGE UP (ref 150–400)
POTASSIUM SERPL-MCNC: 3.9 MMOL/L — SIGNIFICANT CHANGE UP (ref 3.5–5.3)
POTASSIUM SERPL-SCNC: 3.9 MMOL/L — SIGNIFICANT CHANGE UP (ref 3.5–5.3)
PROTHROM AB SERPL-ACNC: 13 SEC — HIGH (ref 9.8–12.7)
RBC # BLD: 3.82 M/UL — LOW (ref 4.4–5.2)
RBC # FLD: 12.9 % — SIGNIFICANT CHANGE UP (ref 11–15.6)
SODIUM SERPL-SCNC: 138 MMOL/L — SIGNIFICANT CHANGE UP (ref 135–145)
SPECIMEN SOURCE: SIGNIFICANT CHANGE UP
TYPE + AB SCN PNL BLD: SIGNIFICANT CHANGE UP
WBC # BLD: 14.5 K/UL — HIGH (ref 4.8–10.8)
WBC # FLD AUTO: 14.5 K/UL — HIGH (ref 4.8–10.8)

## 2017-07-26 PROCEDURE — 93010 ELECTROCARDIOGRAM REPORT: CPT

## 2017-07-26 PROCEDURE — 93306 TTE W/DOPPLER COMPLETE: CPT | Mod: 26

## 2017-07-26 RX ORDER — ACETAMINOPHEN 500 MG
1000 TABLET ORAL ONCE
Qty: 0 | Refills: 0 | Status: DISCONTINUED | OUTPATIENT
Start: 2017-07-26 | End: 2017-07-27

## 2017-07-26 RX ORDER — CARVEDILOL PHOSPHATE 80 MG/1
3.12 CAPSULE, EXTENDED RELEASE ORAL EVERY 12 HOURS
Qty: 0 | Refills: 0 | Status: DISCONTINUED | OUTPATIENT
Start: 2017-07-26 | End: 2017-07-27

## 2017-07-26 RX ORDER — ACETAMINOPHEN 500 MG
650 TABLET ORAL EVERY 6 HOURS
Qty: 0 | Refills: 0 | Status: DISCONTINUED | OUTPATIENT
Start: 2017-07-26 | End: 2017-07-26

## 2017-07-26 RX ADMIN — SODIUM CHLORIDE 100 MILLILITER(S): 9 INJECTION, SOLUTION INTRAVENOUS at 06:28

## 2017-07-26 RX ADMIN — SODIUM CHLORIDE 100 MILLILITER(S): 9 INJECTION, SOLUTION INTRAVENOUS at 14:52

## 2017-07-26 RX ADMIN — MEROPENEM 200 MILLIGRAM(S): 1 INJECTION INTRAVENOUS at 23:08

## 2017-07-26 RX ADMIN — ENOXAPARIN SODIUM 40 MILLIGRAM(S): 100 INJECTION SUBCUTANEOUS at 14:51

## 2017-07-26 RX ADMIN — MEROPENEM 200 MILLIGRAM(S): 1 INJECTION INTRAVENOUS at 14:52

## 2017-07-26 RX ADMIN — MEROPENEM 200 MILLIGRAM(S): 1 INJECTION INTRAVENOUS at 06:28

## 2017-07-26 NOTE — CONSULT NOTE ADULT - ASSESSMENT
The patient currently hemodynamically stable from CV standpoint. ECG no acute ischemic or injury pattern, no HF sxs, BP near goal. patient currently optimizes from Cardiac standpoint.  Based on RCRI criteria  patient is at low risk for MACE <0.4%  for planned orthopedic procedure. Benefits of procedure outweigh risks    Recc:  Keep K>4 Mg >2  IV lopressor 2.5-5mg q 4hrs ruth ann-op for BP/HR control  Avoid systemic hypotension intra-op   PPM interrogation; no pre-OP programming changes needed at this time. Magnet on stand-by intra-op  2-D echo The patient currently hemodynamically stable from CV standpoint. ECG no acute ischemic or injury pattern, no HF sxs, BP near goal. patient currently optimizes from Cardiac standpoint.  Based on RCRI criteria  patient is at low risk for MACE <0.4%  for planned orthopedic procedure. Benefits of procedure outweigh risks    Recc:  Keep K>4 Mg >2  IV lopressor 2.5-5mg q 4hrs ruth ann-op for BP/HR control  Avoid systemic hypotension intra-op   PPM interrogation; ( NOT pacer dependent)  no pre-OP programming changes needed at this time. Magnet on stand-by intra-op  2-D echo The patient currently hemodynamically stable from CV standpoint. ECG no acute ischemic or injury pattern, no HF sxs, BP near goal. patient currently optimizes from Cardiac standpoint.  Based on RCRI criteria  patient is at low risk for MACE <0.4%  for planned orthopedic procedure. Benefits of procedure outweigh risks    Recc:  Keep K>4 Mg >2  IV lopressor 2.5-5mg q 4hrs ruth ann-op for BP/HR control  Avoid systemic hypotension intra-op   ICD  interrogation pre-op, patient not pacer-dependent . Magnet application over device intra-op *( to suspend shock therapy), device will resume pre-op settings once magnet is removed.  2-D echo

## 2017-07-26 NOTE — CONSULT NOTE ADULT - SUBJECTIVE AND OBJECTIVE BOX
Patient is a 77y old  Female who presents with a chief complaint of 78 y/o F w/ nausea and votming (2017 21:12)      HPI:  78 y/o F w/ PMH pacemaker (recently interrogated by Cardiologist from [facility] on ) well known to ACS service comes to the ED w/ c/o of PO intolerance since yesterday. She was recently discharged for acute diverticulitis with microperforation of the sigmoid colon on PO Augmentin. She reports that she has been vomiting everything she has been eating. She denies abdominal pain, last bowel movement was this morning as well as flatus. She denies fevers, chills, chest pain, palpitations, diarrhea, dysuria, dizziness or SOB.   CV Ros:     Allergies: Ciprofloxacin Keflex, codeine, IV contrast    PMH:   Breast CA 17 years ago  HTN  Pacemaker    PSH:  open appendectomy  Cholecystectomy  Bilateral oophorectomy (2017 21:12)      PAST MEDICAL & SURGICAL HISTORY:  Pacemaker  HTN (hypertension)  H/O lymphadenopathy      PREVIOUS DIAGNOSTIC TESTING:      : CT Abdomen and Pelvis No Cont (17 @ 17:31) >  IMPRESSION:    Diverticular abscess noted in the pelvis resulting in obstruction of an   adjacent small bowel loop and small bowel obstruction. Free perforation   noted with free fluid in the peritoneal cavity as well as   pneumoperitoneum..    These critical values were discussed by Dr. Chung Finney with Dr. Sonali Figueroa on 2017 6:00 PM with read back.    < end of copied text >      STRESS  FINDINGS:    CATHETERIZATION  FINDINGS:      Allergies    Cipro (Rash)  codeine (Faint)  IV Contrast (Rash)  Keflex (Rash)    Intolerances        MEDICATIONS  (STANDING):  meropenem IVPB   IV Intermittent   meropenem IVPB 1000 milliGRAM(s) IV Intermittent every 8 hours  enoxaparin Injectable 40 milliGRAM(s) SubCutaneous daily  lactated ringers. 1000 milliLiter(s) (100 mL/Hr) IV Continuous <Continuous>  losartan 50 milliGRAM(s) Oral daily  carvedilol 3.125 milliGRAM(s) Oral every 12 hours    MEDICATIONS  (PRN):  acetaminophen   Tablet. 650 milliGRAM(s) Oral every 6 hours PRN Mild Pain (1 - 3)      FAMILY HISTORY:  No pertinent family history in first degree relatives      SOCIAL HISTORY:    CIGARETTES:    ALCOHOL:    REVIEW OF SYSTEMS:  CONSTITUTIONAL: No fever, weight loss, or fatigue  EYES: No eye pain, visual disturbances, or discharge  ENMT:  No difficulty hearing, tinnitus, vertigo; No sinus or throat pain  NECK: No pain or stiffness  RESPIRATORY: No cough, wheezing, chills or hemoptysis; No Shortness of Breath  CARDIOVASCULAR: No chest pain, palpitations, passing out, dizziness, or leg swelling  GASTROINTESTINAL: No abdominal or epigastric pain. No nausea, vomiting, or hematemesis; No diarrhea or constipation. No melena or hematochezia.  GENITOURINARY: No dysuria, frequency, hematuria, or incontinence  NEUROLOGICAL: No headaches, memory loss, loss of strength, numbness, or tremors  SKIN: No itching, burning, rashes, or lesions   LYMPH Nodes: No enlarged glands  ENDOCRINE: No heat or cold intolerance; No hair loss  MUSCULOSKELETAL: No joint pain or swelling; No muscle, back, or extremity pain  PSYCHIATRIC: No depression, anxiety, mood swings, or difficulty sleeping  HEME/LYMPH: No easy bruising, or bleeding gums  ALLERY AND IMMUNOLOGIC: No hives or eczema	    Vital Signs Last 24 Hrs  T(C): 36.8 (2017 07:47), Max: 36.9 (2017 11:49)  T(F): 98.3 (2017 07:47), Max: 98.4 (2017 11:49)  HR: 74 (2017 07:47) (74 - 84)  BP: 138/71 (2017 07:47) (138/71 - 142/82)  BP(mean): --  RR: 20 (2017 07:47) (16 - 20)  SpO2: 96% (2017 07:47) (96% - 98%)    Daily     Daily     I&O's Detail      PHYSICAL EXAM:  Appearance: Normal, well nourished	  HEENT:   Normal oral mucosa, PERRL, EOMI, sclera non-icteric	  Lymphatic: No cervical lymphadenopathy  Cardiovascular: Normal S1 S2, No JVD, No cardiac murmurs, No carotid bruits, No peripheral edema  Respiratory: Lungs clear to auscultation	  Psychiatry: A & O x 3, Mood & affect appropriate  Gastrointestinal:  Soft, Non-tender, + BS, no bruits	  Skin: No rashes, No ecchymoses, No cyanosis  Neurologic: Grossly non-focal with full strength in all four extremities  Extremities: Normal range of motion, No clubbing, cyanosis or edema  Vascular: Peripheral pulses palpable 2+ bilaterally      INTERPRETATION OF TELEMETRY:    ECG:    LABS:                        11.2   14.5  )-----------( 386      ( 2017 08:16 )             33.5         138  |  96<L>  |  15.0  ----------------------------<  119<H>  3.9   |  31.0<H>  |  0.57    Ca    9.6      2017 08:16  Phos  2.7       Mg     2.1         TPro  6.9  /  Alb  3.6  /  TBili  0.4  /  DBili  x   /  AST  19  /  ALT  14  /  AlkPhos  63  07-        PT/INR - ( 2017 08:16 )   PT: 13.0 sec;   INR: 1.18 ratio         PTT - ( 2017 08:16 )  PTT:26.7 sec  Urinalysis Basic - ( 2017 11:57 )    Color: Yellow / Appearance: Clear / S.020 / pH: x  Gluc: x / Ketone: Moderate  / Bili: Negative / Urobili: Negative mg/dL   Blood: x / Protein: 15 mg/dL / Nitrite: Negative   Leuk Esterase: Trace / RBC: 6-10 /HPF / WBC 3-5   Sq Epi: x / Non Sq Epi: Few / Bacteria: x      I&O's Summary    BNP  RADIOLOGY & ADDITIONAL STUDIES: Patient is a 77y old  Female who presents with a chief complaint of 76 y/o F w/ nausea and votming (2017 21:12)      HPI:  76 y/o F w/ PMH pacemaker (recently interrogated by Cardiologist from [facility] on ) well known to ACS service comes to the ED w/ c/o of PO intolerance since yesterday. She was recently discharged for acute diverticulitis with microperforation of the sigmoid colon on PO Augmentin. She reports that she has been vomiting everything she has been eating. She denies abdominal pain, last bowel movement was this morning as well as flatus.   CV ROS: patient denies CP orthopnea PND, LE edema syncope or pre-syncope  Co-morbid medical conditions are negative for : CVA, MI, DM, COPD, bleeding or clotting disorders (+) PPM. The patient currently resting comfortable without acute complaints. No evidence of HF on exam  All systems reviewed otherwise negative except as above.      Allergies: Ciprofloxacin Keflex, codeine, IV contrast    PMH:   Breast CA 17 years ago  HTN  Pacemaker    PSH:  open appendectomy  Cholecystectomy  Bilateral oophorectomy (2017 21:12)      PAST MEDICAL & SURGICAL HISTORY:  Pacemaker  HTN (hypertension)  H/O lymphadenopathy      PREVIOUS DIAGNOSTIC TESTING:      : CT Abdomen and Pelvis No Cont (17 @ 17:31) >  IMPRESSION:    Diverticular abscess noted in the pelvis resulting in obstruction of an   adjacent small bowel loop and small bowel obstruction. Free perforation   noted with free fluid in the peritoneal cavity as well as   pneumoperitoneum..    These critical values were discussed by Dr. Chung Finney with Dr. Sonali Figueroa on 2017 6:00 PM with read back.    < end of copied text >      STRESS  FINDINGS:    CATHETERIZATION  FINDINGS:      Allergies    Cipro (Rash)  codeine (Faint)  IV Contrast (Rash)  Keflex (Rash)    Intolerances        MEDICATIONS  (STANDING):  meropenem IVPB   IV Intermittent   meropenem IVPB 1000 milliGRAM(s) IV Intermittent every 8 hours  enoxaparin Injectable 40 milliGRAM(s) SubCutaneous daily  lactated ringers. 1000 milliLiter(s) (100 mL/Hr) IV Continuous <Continuous>  losartan 50 milliGRAM(s) Oral daily  carvedilol 3.125 milliGRAM(s) Oral every 12 hours    MEDICATIONS  (PRN):  acetaminophen   Tablet. 650 milliGRAM(s) Oral every 6 hours PRN Mild Pain (1 - 3)      FAMILY HISTORY:  No pertinent family history in first degree relatives      SOCIAL HISTORY: Lives with family    CIGARETTES:none    ALCOHOL:none    REVIEW OF SYSTEMS:  CONSTITUTIONAL: No fever, weight loss, or fatigue  EYES: No eye pain, visual disturbances, or discharge  ENMT:  No difficulty hearing, tinnitus, vertigo; No sinus or throat pain  NECK: No pain or stiffness  RESPIRATORY: No cough, wheezing, chills or hemoptysis; No Shortness of Breath  CARDIOVASCULAR: No chest pain, palpitations, passing out, dizziness, or leg swelling  GASTROINTESTINAL: No abdominal or epigastric pain. No nausea, vomiting, or hematemesis; No diarrhea or constipation. No melena or hematochezia.  GENITOURINARY: No dysuria, frequency, hematuria, or incontinence  NEUROLOGICAL: No headaches, memory loss, loss of strength, numbness, or tremors  SKIN: No itching, burning, rashes, or lesions   LYMPH Nodes: No enlarged glands  ENDOCRINE: No heat or cold intolerance; No hair loss  MUSCULOSKELETAL: No joint pain or swelling; No muscle, back, or extremity pain  PSYCHIATRIC: No depression, anxiety, mood swings, or difficulty sleeping  HEME/LYMPH: No easy bruising, or bleeding gums  ALLERY AND IMMUNOLOGIC: No hives or eczema	    Vital Signs Last 24 Hrs  T(C): 36.8 (2017 07:47), Max: 36.9 (2017 11:49)  T(F): 98.3 (2017 07:47), Max: 98.4 (2017 11:49)  HR: 74 (2017 07:47) (74 - 84)  BP: 138/71 (2017 07:47) (138/71 - 142/82)  BP(mean): --  RR: 20 (2017 07:47) (16 - 20)  SpO2: 96% (2017 07:47) (96% - 98%)    Daily     Daily     I&O's Detail      PHYSICAL EXAM:  Appearance: Normal, well nourished	  HEENT:   Normal oral mucosa, PERRL, EOMI, sclera non-icteric	  Lymphatic: No cervical lymphadenopathy  Cardiovascular: Normal S1 S2, No JVD, No cardiac murmurs, No carotid bruits, No peripheral edema  Respiratory: Lungs clear to auscultation	  Psychiatry: A & O x 3, Mood & affect appropriate  Gastrointestinal:  Soft, Non-tender, + BS, no bruits	  Skin: No rashes, No ecchymoses, No cyanosis  Neurologic: Grossly non-focal with full strength in all four extremities  Extremities: Normal range of motion, No clubbing, cyanosis or edema  Vascular: Peripheral pulses palpable 2+ bilaterally      INTERPRETATION OF TELEMETRY:    ECG:As     LABS:                        11.2   14.5  )-----------( 386      ( 2017 08:16 )             33.5     07-    138  |  96<L>  |  15.0  ----------------------------<  119<H>  3.9   |  31.0<H>  |  0.57    Ca    9.6      2017 08:16  Phos  2.7       Mg     2.1         TPro  6.9  /  Alb  3.6  /  TBili  0.4  /  DBili  x   /  AST  19  /  ALT  14  /  AlkPhos  63  07-25        PT/INR - ( 2017 08:16 )   PT: 13.0 sec;   INR: 1.18 ratio         PTT - ( 2017 08:16 )  PTT:26.7 sec  Urinalysis Basic - ( 2017 11:57 )    Color: Yellow / Appearance: Clear / S.020 / pH: x  Gluc: x / Ketone: Moderate  / Bili: Negative / Urobili: Negative mg/dL   Blood: x / Protein: 15 mg/dL / Nitrite: Negative   Leuk Esterase: Trace / RBC: 6-10 /HPF / WBC 3-5   Sq Epi: x / Non Sq Epi: Few / Bacteria: x      I&O's Summary    BNP  RADIOLOGY & ADDITIONAL STUDIES: Patient is a 77y old  Female who presents with a chief complaint of 78 y/o F w/ nausea and votming (2017 21:12)      HPI:  78 y/o F w/ PMH ICM  s/p VVI ICD  (recently interrogated by Cardiologist from [place] on ) well known to ACS service comes to the ED w/ c/o of PO intolerance since yesterday. She was recently discharged for acute diverticulitis with microperforation of the sigmoid colon on PO Augmentin. She reports that she has been vomiting everything she has been eating. She denies abdominal pain, last bowel movement was this morning as well as flatus.   CV ROS: patient denies CP orthopnea PND, LE edema syncope or pre-syncope  Co-morbid medical conditions are negative for : CVA, MI, DM, COPD, bleeding or clotting disorders (+) ICD. The patient currently resting comfortable without acute complaints. No evidence of HF on exam  All systems reviewed otherwise negative except as above.      Allergies: Ciprofloxacin Keflex, codeine, IV contrast    PMH:   Breast CA 17 years ago  HTN  ICD    PSH:  open appendectomy  Cholecystectomy  Bilateral oophorectomy (2017 21:12)      PAST MEDICAL & SURGICAL HISTORY:  Pacemaker  HTN (hypertension)  H/O lymphadenopathy      PREVIOUS DIAGNOSTIC TESTING:      : CT Abdomen and Pelvis No Cont (17 @ 17:31) >  IMPRESSION:    Diverticular abscess noted in the pelvis resulting in obstruction of an   adjacent small bowel loop and small bowel obstruction. Free perforation   noted with free fluid in the peritoneal cavity as well as   pneumoperitoneum..    These critical values were discussed by Dr. Chung Finney with Dr. Sonali Figueroa on 2017 6:00 PM with read back.    < end of copied text >      STRESS  FINDINGS:    CATHETERIZATION  FINDINGS:      Allergies    Cipro (Rash)  codeine (Faint)  IV Contrast (Rash)  Keflex (Rash)    Intolerances        MEDICATIONS  (STANDING):  meropenem IVPB   IV Intermittent   meropenem IVPB 1000 milliGRAM(s) IV Intermittent every 8 hours  enoxaparin Injectable 40 milliGRAM(s) SubCutaneous daily  lactated ringers. 1000 milliLiter(s) (100 mL/Hr) IV Continuous <Continuous>  losartan 50 milliGRAM(s) Oral daily  carvedilol 3.125 milliGRAM(s) Oral every 12 hours    MEDICATIONS  (PRN):  acetaminophen   Tablet. 650 milliGRAM(s) Oral every 6 hours PRN Mild Pain (1 - 3)      FAMILY HISTORY:  No pertinent family history in first degree relatives      SOCIAL HISTORY: Lives with family    CIGARETTES:none    ALCOHOL:none    REVIEW OF SYSTEMS:  CONSTITUTIONAL: No fever, weight loss, or fatigue  EYES: No eye pain, visual disturbances, or discharge  ENMT:  No difficulty hearing, tinnitus, vertigo; No sinus or throat pain  NECK: No pain or stiffness  RESPIRATORY: No cough, wheezing, chills or hemoptysis; No Shortness of Breath  CARDIOVASCULAR: No chest pain, palpitations, passing out, dizziness, or leg swelling  GASTROINTESTINAL: No abdominal or epigastric pain. No nausea, vomiting, or hematemesis; No diarrhea or constipation. No melena or hematochezia.  GENITOURINARY: No dysuria, frequency, hematuria, or incontinence  NEUROLOGICAL: No headaches, memory loss, loss of strength, numbness, or tremors  SKIN: No itching, burning, rashes, or lesions   LYMPH Nodes: No enlarged glands  ENDOCRINE: No heat or cold intolerance; No hair loss  MUSCULOSKELETAL: No joint pain or swelling; No muscle, back, or extremity pain  PSYCHIATRIC: No depression, anxiety, mood swings, or difficulty sleeping  HEME/LYMPH: No easy bruising, or bleeding gums  ALLERY AND IMMUNOLOGIC: No hives or eczema	    Vital Signs Last 24 Hrs  T(C): 36.8 (2017 07:47), Max: 36.9 (2017 11:49)  T(F): 98.3 (2017 07:47), Max: 98.4 (2017 11:49)  HR: 74 (2017 07:47) (74 - 84)  BP: 138/71 (2017 07:47) (138/71 - 142/82)  BP(mean): --  RR: 20 (2017 07:47) (16 - 20)  SpO2: 96% (2017 07:47) (96% - 98%)    Daily     Daily     I&O's Detail      PHYSICAL EXAM:  Appearance: Normal, well nourished	  HEENT:   Normal oral mucosa, PERRL, EOMI, sclera non-icteric	  Lymphatic: No cervical lymphadenopathy  Cardiovascular: Normal S1 S2, No JVD, No cardiac murmurs, No carotid bruits, No peripheral edema  Respiratory: Lungs clear to auscultation	  Psychiatry: A & O x 3, Mood & affect appropriate  Gastrointestinal:  Soft, Non-tender, + BS, no bruits	  Skin: No rashes, No ecchymoses, No cyanosis  Neurologic: Grossly non-focal with full strength in all four extremities  Extremities: Normal range of motion, No clubbing, cyanosis or edema  Vascular: Peripheral pulses palpable 2+ bilaterally      INTERPRETATION OF TELEMETRY:    ECG:As     LABS:                        11.2   14.5  )-----------( 386      ( 2017 08:16 )             33.5     07-    138  |  96<L>  |  15.0  ----------------------------<  119<H>  3.9   |  31.0<H>  |  0.57    Ca    9.6      2017 08:16  Phos  2.7       Mg     2.1         TPro  6.9  /  Alb  3.6  /  TBili  0.4  /  DBili  x   /  AST  19  /  ALT  14  /  AlkPhos  63  07-25        PT/INR - ( 2017 08:16 )   PT: 13.0 sec;   INR: 1.18 ratio         PTT - ( 2017 08:16 )  PTT:26.7 sec  Urinalysis Basic - ( 2017 11:57 )    Color: Yellow / Appearance: Clear / S.020 / pH: x  Gluc: x / Ketone: Moderate  / Bili: Negative / Urobili: Negative mg/dL   Blood: x / Protein: 15 mg/dL / Nitrite: Negative   Leuk Esterase: Trace / RBC: 6-10 /HPF / WBC 3-5   Sq Epi: x / Non Sq Epi: Few / Bacteria: x      I&O's Summary    BNP  RADIOLOGY & ADDITIONAL STUDIES: Patient is a 77y old  Female who presents with a chief complaint of 76 y/o F w/ nausea and votming (2017 21:12)      HPI:  76 y/o F w/ PMH ICM  s/p VVI ICD  (recently interrogated by Cardiologist from [place] on ) well known to ACS service comes to the ED w/ c/o of PO intolerance since yesterday. She was recently discharged for acute diverticulitis with microperforation of the sigmoid colon on PO Augmentin. She reports that she has been vomiting everything she has been eating. She denies abdominal pain, last bowel movement was this morning as well as flatus.   CV ROS: patient denies CP orthopnea PND, LE edema syncope or pre-syncope  Co-morbid medical conditions are negative for : CVA, MI, DM, COPD, bleeding or clotting disorders (+) ICD. The patient currently resting comfortable without acute complaints. No evidence of HF on exam  All systems reviewed otherwise negative except as above.      Allergies: Ciprofloxacin Keflex, codeine, IV contrast    PMH:   Breast CA 17 years ago  HTN  ICD    PSH:  open appendectomy  Cholecystectomy  Bilateral oophorectomy (2017 21:12)      PAST MEDICAL & SURGICAL HISTORY:  Pacemaker  HTN (hypertension)  H/O lymphadenopathy      PREVIOUS DIAGNOSTIC TESTING:      : CT Abdomen and Pelvis No Cont (17 @ 17:31) >  IMPRESSION:    Diverticular abscess noted in the pelvis resulting in obstruction of an   adjacent small bowel loop and small bowel obstruction. Free perforation   noted with free fluid in the peritoneal cavity as well as   pneumoperitoneum..    These critical values were discussed by Dr. Chung Finney with Dr. Sonali Figueroa on 2017 6:00 PM with read back.    < end of copied text >        Allergies    Cipro (Rash)  codeine (Faint)  IV Contrast (Rash)  Keflex (Rash)    Intolerances        MEDICATIONS  (STANDING):  meropenem IVPB   IV Intermittent   meropenem IVPB 1000 milliGRAM(s) IV Intermittent every 8 hours  enoxaparin Injectable 40 milliGRAM(s) SubCutaneous daily  lactated ringers. 1000 milliLiter(s) (100 mL/Hr) IV Continuous <Continuous>  losartan 50 milliGRAM(s) Oral daily  carvedilol 3.125 milliGRAM(s) Oral every 12 hours    MEDICATIONS  (PRN):  acetaminophen   Tablet. 650 milliGRAM(s) Oral every 6 hours PRN Mild Pain (1 - 3)      FAMILY HISTORY:  No pertinent family history in first degree relatives      SOCIAL HISTORY: Lives with family    CIGARETTES:none    ALCOHOL:none    REVIEW OF SYSTEMS:  CONSTITUTIONAL: No fever, weight loss, or fatigue  EYES: No eye pain, visual disturbances, or discharge  ENMT:  No difficulty hearing, tinnitus, vertigo; No sinus or throat pain  NECK: No pain or stiffness  RESPIRATORY: No cough, wheezing, chills or hemoptysis; No Shortness of Breath  CARDIOVASCULAR: No chest pain, palpitations, passing out, dizziness, or leg swelling  GASTROINTESTINAL: No abdominal or epigastric pain. No nausea, vomiting, or hematemesis; No diarrhea or constipation. No melena or hematochezia.  GENITOURINARY: No dysuria, frequency, hematuria, or incontinence  NEUROLOGICAL: No headaches, memory loss, loss of strength, numbness, or tremors  SKIN: No itching, burning, rashes, or lesions   LYMPH Nodes: No enlarged glands  ENDOCRINE: No heat or cold intolerance; No hair loss  MUSCULOSKELETAL: No joint pain or swelling; No muscle, back, or extremity pain  PSYCHIATRIC: No depression, anxiety, mood swings, or difficulty sleeping  HEME/LYMPH: No easy bruising, or bleeding gums  ALLERY AND IMMUNOLOGIC: No hives or eczema	    Vital Signs Last 24 Hrs  T(C): 36.8 (2017 07:47), Max: 36.9 (2017 11:49)  T(F): 98.3 (2017 07:47), Max: 98.4 (2017 11:49)  HR: 74 (2017 07:47) (74 - 84)  BP: 138/71 (2017 07:47) (138/71 - 142/82)  BP(mean): --  RR: 20 (2017 07:47) (16 - 20)  SpO2: 96% (2017 07:47) (96% - 98%)    Daily     Daily     I&O's Detail      PHYSICAL EXAM:  Appearance: Normal, well nourished	  HEENT:   Normal oral mucosa, PERRL, EOMI, sclera non-icteric	  Lymphatic: No cervical lymphadenopathy  Cardiovascular: Normal S1 S2, No JVD, No cardiac murmurs, No carotid bruits, No peripheral edema  Respiratory: Lungs clear to auscultation	  Psychiatry: A & O x 3, Mood & affect appropriate  Gastrointestinal:  Soft, Non-tender, + BS, no bruits	  Skin: No rashes, No ecchymoses, No cyanosis  Neurologic: Grossly non-focal with full strength in all four extremities  Extremities: Normal range of motion, No clubbing, cyanosis or edema  Vascular: Peripheral pulses palpable 2+ bilaterally      INTERPRETATION OF TELEMETRY:    ECG:As     LABS:                        11.2   14.5  )-----------( 386      ( 2017 08:16 )             33.5     07-    138  |  96<L>  |  15.0  ----------------------------<  119<H>  3.9   |  31.0<H>  |  0.57    Ca    9.6      2017 08:16  Phos  2.7       Mg     2.1         TPro  6.9  /  Alb  3.6  /  TBili  0.4  /  DBili  x   /  AST  19  /  ALT  14  /  AlkPhos  63  07-25        PT/INR - ( 2017 08:16 )   PT: 13.0 sec;   INR: 1.18 ratio         PTT - ( 2017 08:16 )  PTT:26.7 sec  Urinalysis Basic - ( 2017 11:57 )    Color: Yellow / Appearance: Clear / S.020 / pH: x  Gluc: x / Ketone: Moderate  / Bili: Negative / Urobili: Negative mg/dL   Blood: x / Protein: 15 mg/dL / Nitrite: Negative   Leuk Esterase: Trace / RBC: 6-10 /HPF / WBC 3-5   Sq Epi: x / Non Sq Epi: Few / Bacteria: x      I&O's Summary    BNP  RADIOLOGY & ADDITIONAL STUDIES:

## 2017-07-26 NOTE — ED ADULT NURSE REASSESSMENT NOTE - NS ED NURSE REASSESS COMMENT FT1
Patient A&OX3, Denies any pain or discomfort at this time VSS. safety Maintained.
pt A&Ox4, resp even and unlabored no distress noted, pt denies any pain and has no complaints at this time, will continue to monitor
MD Drummond at bedside orders to hold PO meds for 6am, resp even and unlabored no distress noted, pt denies any pain and has no complaints at this time, will continue to monitor
Patient A&OX3, Denies any pain or discomfort at this time VSS. safety Maintained.

## 2017-07-27 ENCOUNTER — RESULT REVIEW (OUTPATIENT)
Age: 77
End: 2017-07-27

## 2017-07-27 DIAGNOSIS — K57.80 DIVERTICULITIS OF INTESTINE, PART UNSPECIFIED, WITH PERFORATION AND ABSCESS WITHOUT BLEEDING: ICD-10-CM

## 2017-07-27 LAB
ANION GAP SERPL CALC-SCNC: 10 MMOL/L — SIGNIFICANT CHANGE UP (ref 5–17)
BLD GP AB SCN SERPL QL: SIGNIFICANT CHANGE UP
BUN SERPL-MCNC: 16 MG/DL — SIGNIFICANT CHANGE UP (ref 8–20)
CALCIUM SERPL-MCNC: 8.5 MG/DL — LOW (ref 8.6–10.2)
CHLORIDE SERPL-SCNC: 105 MMOL/L — SIGNIFICANT CHANGE UP (ref 98–107)
CO2 SERPL-SCNC: 27 MMOL/L — SIGNIFICANT CHANGE UP (ref 22–29)
CREAT SERPL-MCNC: 0.54 MG/DL — SIGNIFICANT CHANGE UP (ref 0.5–1.3)
GLUCOSE SERPL-MCNC: 86 MG/DL — SIGNIFICANT CHANGE UP (ref 70–115)
GRAM STN FLD: SIGNIFICANT CHANGE UP
HCT VFR BLD CALC: 32.1 % — LOW (ref 37–47)
HGB BLD-MCNC: 10.4 G/DL — LOW (ref 12–16)
MAGNESIUM SERPL-MCNC: 2 MG/DL — SIGNIFICANT CHANGE UP (ref 1.6–2.6)
MCHC RBC-ENTMCNC: 29.5 PG — SIGNIFICANT CHANGE UP (ref 27–31)
MCHC RBC-ENTMCNC: 32.4 G/DL — SIGNIFICANT CHANGE UP (ref 32–36)
MCV RBC AUTO: 90.9 FL — SIGNIFICANT CHANGE UP (ref 81–99)
PHOSPHATE SERPL-MCNC: 1.9 MG/DL — LOW (ref 2.4–4.7)
PLATELET # BLD AUTO: 338 K/UL — SIGNIFICANT CHANGE UP (ref 150–400)
POTASSIUM SERPL-MCNC: 3.6 MMOL/L — SIGNIFICANT CHANGE UP (ref 3.5–5.3)
POTASSIUM SERPL-SCNC: 3.6 MMOL/L — SIGNIFICANT CHANGE UP (ref 3.5–5.3)
RBC # BLD: 3.53 M/UL — LOW (ref 4.4–5.2)
RBC # FLD: 12.9 % — SIGNIFICANT CHANGE UP (ref 11–15.6)
SODIUM SERPL-SCNC: 142 MMOL/L — SIGNIFICANT CHANGE UP (ref 135–145)
SPECIMEN SOURCE: SIGNIFICANT CHANGE UP
TYPE + AB SCN PNL BLD: SIGNIFICANT CHANGE UP
WBC # BLD: 8.2 K/UL — SIGNIFICANT CHANGE UP (ref 4.8–10.8)
WBC # FLD AUTO: 8.2 K/UL — SIGNIFICANT CHANGE UP (ref 4.8–10.8)

## 2017-07-27 PROCEDURE — 88305 TISSUE EXAM BY PATHOLOGIST: CPT | Mod: 26

## 2017-07-27 PROCEDURE — 88307 TISSUE EXAM BY PATHOLOGIST: CPT | Mod: 26

## 2017-07-27 PROCEDURE — 88313 SPECIAL STAINS GROUP 2: CPT | Mod: 26,59

## 2017-07-27 PROCEDURE — 49020 DRAINAGE ABDOM ABSCESS OPEN: CPT | Mod: 59

## 2017-07-27 PROCEDURE — 44625 REPAIR BOWEL OPENING: CPT

## 2017-07-27 PROCEDURE — 44144 PARTIAL REMOVAL OF COLON: CPT

## 2017-07-27 PROCEDURE — 47100 WEDGE BIOPSY OF LIVER: CPT

## 2017-07-27 RX ORDER — ACETAMINOPHEN 500 MG
1000 TABLET ORAL ONCE
Qty: 0 | Refills: 0 | Status: COMPLETED | OUTPATIENT
Start: 2017-07-27 | End: 2017-07-27

## 2017-07-27 RX ORDER — POTASSIUM PHOSPHATE, MONOBASIC POTASSIUM PHOSPHATE, DIBASIC 236; 224 MG/ML; MG/ML
15 INJECTION, SOLUTION INTRAVENOUS ONCE
Qty: 0 | Refills: 0 | Status: COMPLETED | OUTPATIENT
Start: 2017-07-27 | End: 2017-07-27

## 2017-07-27 RX ORDER — POTASSIUM CHLORIDE 20 MEQ
10 PACKET (EA) ORAL
Qty: 0 | Refills: 0 | Status: COMPLETED | OUTPATIENT
Start: 2017-07-27 | End: 2017-07-27

## 2017-07-27 RX ORDER — PANTOPRAZOLE SODIUM 20 MG/1
40 TABLET, DELAYED RELEASE ORAL DAILY
Qty: 0 | Refills: 0 | Status: DISCONTINUED | OUTPATIENT
Start: 2017-07-27 | End: 2017-07-30

## 2017-07-27 RX ORDER — SODIUM CHLORIDE 9 MG/ML
1000 INJECTION, SOLUTION INTRAVENOUS
Qty: 0 | Refills: 0 | Status: DISCONTINUED | OUTPATIENT
Start: 2017-07-27 | End: 2017-07-30

## 2017-07-27 RX ORDER — FENTANYL CITRATE 50 UG/ML
50 INJECTION INTRAVENOUS
Qty: 0 | Refills: 0 | Status: DISCONTINUED | OUTPATIENT
Start: 2017-07-27 | End: 2017-07-27

## 2017-07-27 RX ORDER — MEROPENEM 1 G/30ML
1000 INJECTION INTRAVENOUS EVERY 8 HOURS
Qty: 0 | Refills: 0 | Status: DISCONTINUED | OUTPATIENT
Start: 2017-07-27 | End: 2017-08-01

## 2017-07-27 RX ORDER — HYDROMORPHONE HYDROCHLORIDE 2 MG/ML
0.5 INJECTION INTRAMUSCULAR; INTRAVENOUS; SUBCUTANEOUS
Qty: 0 | Refills: 0 | Status: DISCONTINUED | OUTPATIENT
Start: 2017-07-27 | End: 2017-07-27

## 2017-07-27 RX ORDER — METOPROLOL TARTRATE 50 MG
5 TABLET ORAL EVERY 6 HOURS
Qty: 0 | Refills: 0 | Status: DISCONTINUED | OUTPATIENT
Start: 2017-07-27 | End: 2017-07-29

## 2017-07-27 RX ORDER — HYDROMORPHONE HYDROCHLORIDE 2 MG/ML
30 INJECTION INTRAMUSCULAR; INTRAVENOUS; SUBCUTANEOUS
Qty: 0 | Refills: 0 | Status: DISCONTINUED | OUTPATIENT
Start: 2017-07-27 | End: 2017-07-30

## 2017-07-27 RX ORDER — ENOXAPARIN SODIUM 100 MG/ML
40 INJECTION SUBCUTANEOUS DAILY
Qty: 0 | Refills: 0 | Status: DISCONTINUED | OUTPATIENT
Start: 2017-07-27 | End: 2017-08-01

## 2017-07-27 RX ORDER — ONDANSETRON 8 MG/1
4 TABLET, FILM COATED ORAL ONCE
Qty: 0 | Refills: 0 | Status: DISCONTINUED | OUTPATIENT
Start: 2017-07-27 | End: 2017-07-27

## 2017-07-27 RX ADMIN — POTASSIUM PHOSPHATE, MONOBASIC POTASSIUM PHOSPHATE, DIBASIC 62.5 MILLIMOLE(S): 236; 224 INJECTION, SOLUTION INTRAVENOUS at 23:44

## 2017-07-27 RX ADMIN — FENTANYL CITRATE 50 MICROGRAM(S): 50 INJECTION INTRAVENOUS at 00:00

## 2017-07-27 RX ADMIN — FENTANYL CITRATE 50 MICROGRAM(S): 50 INJECTION INTRAVENOUS at 14:03

## 2017-07-27 RX ADMIN — Medication 1000 MILLIGRAM(S): at 18:00

## 2017-07-27 RX ADMIN — Medication 5 MILLIGRAM(S): at 18:03

## 2017-07-27 RX ADMIN — MEROPENEM 200 MILLIGRAM(S): 1 INJECTION INTRAVENOUS at 05:37

## 2017-07-27 RX ADMIN — SODIUM CHLORIDE 100 MILLILITER(S): 9 INJECTION, SOLUTION INTRAVENOUS at 02:04

## 2017-07-27 RX ADMIN — Medication 400 MILLIGRAM(S): at 17:35

## 2017-07-27 RX ADMIN — Medication 5 MILLIGRAM(S): at 23:43

## 2017-07-27 RX ADMIN — FENTANYL CITRATE 50 MICROGRAM(S): 50 INJECTION INTRAVENOUS at 14:36

## 2017-07-27 RX ADMIN — MEROPENEM 200 MILLIGRAM(S): 1 INJECTION INTRAVENOUS at 23:39

## 2017-07-27 RX ADMIN — Medication 100 MILLIEQUIVALENT(S): at 19:53

## 2017-07-27 RX ADMIN — MEROPENEM 200 MILLIGRAM(S): 1 INJECTION INTRAVENOUS at 14:04

## 2017-07-27 RX ADMIN — CARVEDILOL PHOSPHATE 3.12 MILLIGRAM(S): 80 CAPSULE, EXTENDED RELEASE ORAL at 05:37

## 2017-07-27 RX ADMIN — PANTOPRAZOLE SODIUM 40 MILLIGRAM(S): 20 TABLET, DELAYED RELEASE ORAL at 23:43

## 2017-07-27 RX ADMIN — HYDROMORPHONE HYDROCHLORIDE 30 MILLILITER(S): 2 INJECTION INTRAMUSCULAR; INTRAVENOUS; SUBCUTANEOUS at 23:34

## 2017-07-27 RX ADMIN — Medication 100 MILLIEQUIVALENT(S): at 17:35

## 2017-07-27 RX ADMIN — FENTANYL CITRATE 50 MICROGRAM(S): 50 INJECTION INTRAVENOUS at 15:00

## 2017-07-27 NOTE — PROGRESS NOTE ADULT - SUBJECTIVE AND OBJECTIVE BOX
Patient is a 77y old  Female who presents with a chief complaint of I was having severe nausea   and vomiting  and right sided pain.  She is scheduled to have an EXPLORATORY LAPAROTOMY  SIGMOIDECTOMY, END COLOSTOMY, AND DRAINAGE OF AN INTRA-ABDOMINAL ABSCESS   (2017 01:43)      PAST MEDICAL HISTORY:  Pacemaker-AICD  HTN (hypertension)  Right breast cancer s/p 35 doses of R.T.    PAST SURGICAL HISTORY:  H/O lymphadenopathy  Appendectomy  Cholecystectomy    MEDICATIONS  (STANDING):  meropenem IVPB   IV Intermittent   meropenem IVPB 1000 milliGRAM(s) IV Intermittent every 8 hours  enoxaparin Injectable 40 milliGRAM(s) SubCutaneous daily  lactated ringers. 1000 milliLiter(s) (100 mL/Hr) IV Continuous <Continuous>  losartan 50 milliGRAM(s) Oral daily  carvedilol 3.125 milliGRAM(s) Oral every 12 hours    MEDICATIONS  (PRN):  acetaminophen  IVPB. 1000 milliGRAM(s) IV Intermittent once PRN Mild Pain (1 - 3)      Allergies:  Cipro (Rash)                  codeine (Faint)                  IV Contrast (Rash)                  Keflex (Rash)    SOCIAL HISTORY:  The patient doesn't drink alcohol or use illicit drugs. She quit smoking 50                                 years ago after she smoked for 1/2 ppd x 10 years.                          11.2   14.5  )-----------( 386      ( 2017 08:16 )             33.5       PT/INR - ( 2017 08:16 )   PT: 13.0 sec;   INR: 1.18 ratio         PTT - ( 2017 08:16 )  PTT:26.7 sec        138  |  96<L>  |  15.0  ----------------------------<  119<H>  3.9   |  31.0<H>  |  0.57    Ca    9.6      2017 08:16  Phos  2.7       Mg     2.1         TPro  6.9  /  Alb  3.6  /  TBili  0.4  /  DBili  x   /  AST  19  /  ALT  14  /  AlkPhos  63        Height (cm): 160.02 ( @ 01:21)  Weight (kg): 70 ( @ 01:21)  BMI (kg/m2): 27.3 ( @ 01:21)    EK2017  Normal sinus rhythm  Left axis deviation  Incomplete left bundle branch block  Moderate voltage criteria for LVH, may be normal variant   Summary:   1. Technically fair study.   2. Mildly decreased global left ventricular systolic function.   3. Left ventricular ejection fraction, by visual estimation, is 40 to   45%.   4. Spectral Doppler shows impaired relaxation pattern of left   ventricular myocardial filling (Grade I diastolic dysfunction).   5. Basal anterior segment and basal inferior segment are abnormal as   described above.   6. Thickening of the anterior and posterior mitral valve leaflets.   7. Mild mitral annular calcification.   8. Moderate to severe aortic regurgitation.   9. Sclerotic aortic valve with decreased opening.  10. Mildly enlarged left atrium.  11. Abnormal septal motion consistent with RV pacemaker.    CXR    IMPRESSION: No acute cardiopulmonary disease process.    ASA # = 3   Mallampati # = 3  (Upper plate & 2 hearing aids)

## 2017-07-27 NOTE — PATIENT PROFILE ADULT. - CENTRAL VENOUS CATHETER
-Lab Results & Recommendations Relayed  Patient is schedule for a 2 hr GTT & A1c on Wednesday 1/18/17 @ 9AM.  Patient states understanding of these recommendations & the Prep needed for this study.     ---- Message from Erik Sood MD sent at 12/15/2016 10:03 AM CST -----  Glu up . Recommend 2 hr GTT and HbA1c    
no

## 2017-07-27 NOTE — PATIENT PROFILE ADULT. - ABILITY TO HEAR (WITH HEARING AID OR HEARING APPLIANCE IF NORMALLY USED):
Mildly to Moderately Impaired: difficulty hearing in some environments or speaker may need to increase volume or speak distinctly/uses hearing aids

## 2017-07-27 NOTE — PROGRESS NOTE ADULT - SUBJECTIVE AND OBJECTIVE BOX
INTERVAL HPI/OVERNIGHT EVENTS: Patient seen and examined at bedside. No acute events overnight. Patient is scheduled for OR today to drain the abscess and She has been NPO. Denies any pain, fever, chills, nausea, vomiting, shortness of breath or chest pain.       MEDICATIONS  (STANDING):  meropenem IVPB   IV Intermittent   meropenem IVPB 1000 milliGRAM(s) IV Intermittent every 8 hours  enoxaparin Injectable 40 milliGRAM(s) SubCutaneous daily  lactated ringers. 1000 milliLiter(s) (100 mL/Hr) IV Continuous <Continuous>  losartan 50 milliGRAM(s) Oral daily  carvedilol 3.125 milliGRAM(s) Oral every 12 hours    MEDICATIONS  (PRN):  acetaminophen  IVPB. 1000 milliGRAM(s) IV Intermittent once PRN Mild Pain (1 - 3)      Vital Signs Last 24 Hrs  T(C): 36.9 (2017 06:24), Max: 36.9 (2017 13:32)  T(F): 98.4 (2017 06:24), Max: 98.4 (2017 13:32)  HR: 74 (2017 06:24) (74 - 83)  BP: 136/72 (2017 06:24) (105/68 - 138/71)  BP(mean): --  RR: 18 (2017 06:24) (18 - 20)  SpO2: 96% (2017 06:24) (95% - 100%)    Physical Exam:    Neurological:  No sensory/motor deficits  Respiratory: Breath Sounds equal & clear to auscultation, no accessory muscle use  Cardiovascular: Regular rate & rhythm, normal S1, S2; no murmurs, gallops or rubs  Gastrointestinal: Soft, non-tender, normal bowel sounds  Extremities: No peripheral edema, No cyanosis, clubbing   Vascular: Equal and normal pulses: 2+ peripheral pulses throughout  Musculoskeletal: No joint pain, swelling or deformity; no limitation of movement  Skin: No rashes      I&O's Detail    2017 07:01  -  2017 07:00  --------------------------------------------------------  IN:    lactated ringers.: 500 mL    Solution: 100 mL  Total IN: 600 mL    OUT:  Total OUT: 0 mL    Total NET: 600 mL          LABS:                        11.2   14.5  )-----------( 386      ( 2017 08:16 )             33.5         142  |  105  |  16.0  ----------------------------<  86  3.6   |  27.0  |  0.54    Ca    8.5<L>      2017 06:21  Phos  1.9       Mg     2.0         TPro  6.9  /  Alb  3.6  /  TBili  0.4  /  DBili  x   /  AST  19  /  ALT  14  /  AlkPhos  63  07-25    PT/INR - ( 2017 08:16 )   PT: 13.0 sec;   INR: 1.18 ratio         PTT - ( 2017 08:16 )  PTT:26.7 sec  Urinalysis Basic - ( 2017 11:57 )    Color: Yellow / Appearance: Clear / S.020 / pH: x  Gluc: x / Ketone: Moderate  / Bili: Negative / Urobili: Negative mg/dL   Blood: x / Protein: 15 mg/dL / Nitrite: Negative   Leuk Esterase: Trace / RBC: 6-10 /HPF / WBC 3-5   Sq Epi: x / Non Sq Epi: Few / Bacteria: x        RADIOLOGY & ADDITIONAL STUDIES:

## 2017-07-27 NOTE — BRIEF OPERATIVE NOTE - SPECIMENS
sigmoid colon, small bowel segment, proximal donut, distal donut sigmoid colon, small bowel segment, proximal donut, distal donut, nodule of liver

## 2017-07-27 NOTE — PROGRESS NOTE ADULT - SUBJECTIVE AND OBJECTIVE BOX
POST OP NOTE:    STATUS POST:    Liver biopsy  07/27/2017    Sigmoidectomy    Exploratory laparotomy    drainage of pelvic abscess   Ileostomy    Mobilization of splenic flexure    Small bowel resection, partial      Patient was seen and examined at bedside post operatively. Patient was doing well but complaining of cramping abdominal pain. Abdomen soft, diffusely tender, and non distended.      MEDICATIONS  (STANDING):  enoxaparin Injectable 40 milliGRAM(s) SubCutaneous daily  lactated ringers. 1000 milliLiter(s) (100 mL/Hr) IV Continuous <Continuous>  meropenem IVPB 1000 milliGRAM(s) IV Intermittent every 8 hours  metoprolol Injectable 5 milliGRAM(s) IV Push every 6 hours  pantoprazole  Injectable 40 milliGRAM(s) IV Push daily  potassium phosphate IVPB 15 milliMole(s) IV Intermittent once  HYDROmorphone PCA (1 mG/mL) 30 milliLiter(s) PCA Continuous PCA Continuous    MEDICATIONS  (PRN):      Vital Signs Last 24 Hrs  T(C): 36.7 (27 Jul 2017 19:14), Max: 36.9 (27 Jul 2017 06:24)  T(F): 98 (27 Jul 2017 19:14), Max: 98.4 (27 Jul 2017 06:24)  HR: 81 (27 Jul 2017 19:14) (74 - 99)  BP: 91/57 (27 Jul 2017 19:14) (88/43 - 136/72)  BP(mean): --  RR: 18 (27 Jul 2017 19:14) (11 - 21)  SpO2: 96% (27 Jul 2017 19:14) (95% - 100%)    Physical Exam:    Neurological:  No sensory/motor deficits  HEENT: PERRLA, EOMI  Respiratory: Breath Sounds equal & CTA bilaterally, no accessory muscle use  Cardiovascular: Regular rate & rhythm, normal S1, S2; no murmurs, gallops or rubs  Gastrointestinal: Soft, non-tender, nondistended  Extremities:   Vascular: Equal and normal pulses: 2+ peripheral pulses throughout  Musculoskeletal: No joint pain, swelling or deformity; no limitation of movement  Skin: No rashes      I&O's Detail    26 Jul 2017 07:01  -  27 Jul 2017 07:00  --------------------------------------------------------  IN:    lactated ringers.: 500 mL    Solution: 100 mL  Total IN: 600 mL    OUT:  Total OUT: 0 mL    Total NET: 600 mL      27 Jul 2017 07:01  -  27 Jul 2017 23:39  --------------------------------------------------------  IN:  Total IN: 0 mL    OUT:    Indwelling Catheter - Urethral: 200 mL  Total OUT: 200 mL    Total NET: -200 mL          LABS:                        10.4   8.2   )-----------( 338      ( 27 Jul 2017 06:21 )             32.1     07-27    142  |  105  |  16.0  ----------------------------<  86  3.6   |  27.0  |  0.54    Ca    8.5<L>      27 Jul 2017 06:21  Phos  1.9     07-27  Mg     2.0     07-27      PT/INR - ( 26 Jul 2017 08:16 )   PT: 13.0 sec;   INR: 1.18 ratio         PTT - ( 26 Jul 2017 08:16 )  PTT:26.7 sec      RADIOLOGY & ADDITIONAL STUDIES: POST OP NOTE:    STATUS POST:    Liver biopsy  07/27/2017    Sigmoidectomy    Exploratory laparotomy    drainage of pelvic abscess   Ileostomy    Mobilization of splenic flexure    Small bowel resection, partial      Patient was seen and examined at bedside post operatively. Patient complaining of mild cramping abdominal pain but overall doing well. Abdomen soft, diffusely tender, and non distended. Incision sites clear, dry and intact. Ostomy bag was functioning. NGT output was 50cc and Pradhan bag contained 125cc. Pt denied fever, chills, SOB, chest pain, nausea, and vomiting.       MEDICATIONS  (STANDING):  enoxaparin Injectable 40 milliGRAM(s) SubCutaneous daily  lactated ringers. 1000 milliLiter(s) (100 mL/Hr) IV Continuous <Continuous>  meropenem IVPB 1000 milliGRAM(s) IV Intermittent every 8 hours  metoprolol Injectable 5 milliGRAM(s) IV Push every 6 hours  pantoprazole  Injectable 40 milliGRAM(s) IV Push daily  potassium phosphate IVPB 15 milliMole(s) IV Intermittent once  HYDROmorphone PCA (1 mG/mL) 30 milliLiter(s) PCA Continuous PCA Continuous    MEDICATIONS  (PRN):      Vital Signs Last 24 Hrs  T(C): 36.7 (27 Jul 2017 19:14), Max: 36.9 (27 Jul 2017 06:24)  T(F): 98 (27 Jul 2017 19:14), Max: 98.4 (27 Jul 2017 06:24)  HR: 81 (27 Jul 2017 19:14) (74 - 99)  BP: 91/57 (27 Jul 2017 19:14) (88/43 - 136/72)  BP(mean): --  RR: 18 (27 Jul 2017 19:14) (11 - 21)  SpO2: 96% (27 Jul 2017 19:14) (95% - 100%)    Physical Exam:  Gen: NAD, AAOx3,   Neurological:  No sensory/motor deficits  HEENT: PERRLA, EOMI  Respiratory: Breath Sounds equal & CTA bilaterally, no accessory muscle use  Cardiovascular: Regular rate & rhythm, normal S1, S2; no murmurs, gallops or rubs  Gastrointestinal: Soft, tender, nondistended, ostomy bag on R side with feces, laparoscopic incision sites c/d/i  Vascular: Equal and normal pulses: 2+ peripheral pulses throughout  Musculoskeletal: No joint pain, swelling or deformity; no limitation of movement  Skin: No rashes      I&O's Detail    26 Jul 2017 07:01  -  27 Jul 2017 07:00  --------------------------------------------------------  IN:    lactated ringers.: 500 mL    Solution: 100 mL  Total IN: 600 mL    OUT:  Total OUT: 0 mL    Total NET: 600 mL      27 Jul 2017 07:01  -  27 Jul 2017 23:39  --------------------------------------------------------  IN:  Total IN: 0 mL    OUT:    Indwelling Catheter - Urethral: 200 mL  Total OUT: 200 mL    Total NET: -200 mL          LABS:                        10.4   8.2   )-----------( 338      ( 27 Jul 2017 06:21 )             32.1     07-27    142  |  105  |  16.0  ----------------------------<  86  3.6   |  27.0  |  0.54    Ca    8.5<L>      27 Jul 2017 06:21  Phos  1.9     07-27  Mg     2.0     07-27      PT/INR - ( 26 Jul 2017 08:16 )   PT: 13.0 sec;   INR: 1.18 ratio         PTT - ( 26 Jul 2017 08:16 )  PTT:26.7 sec      RADIOLOGY & ADDITIONAL STUDIES:

## 2017-07-27 NOTE — BRIEF OPERATIVE NOTE - POST-OP DX
Diverticulitis of both large and small intestine with abscess without bleeding  07/27/2017    Active  Armando Brower Diverticulitis of large intestine with abscess without bleeding  07/27/2017    Active  Laurence Lopez

## 2017-07-27 NOTE — BRIEF OPERATIVE NOTE - OPERATION/FINDINGS
perforated diverticulitis with small bowel adherent to sigmoid colon, adhesions Perforated diverticulitis with small bowel adherent to sigmoid colon.  Small bowel adherent to large bowel and mesentery.  Air filled abscess.  Two nodules on the liver.  Omental adhesion to the right anterior abdominal wall

## 2017-07-27 NOTE — BRIEF OPERATIVE NOTE - PROCEDURE
Mobilization of splenic flexure  07/27/2017    Active  [email or web address]  Small bowel resection, partial  07/27/2017    Active  [email or web address]  Sigmoidectomy  07/27/2017    Active  [email or web address]  Ileostomy  07/27/2017    Active  [email or web address]  Exploratory laparotomy  07/27/2017  drainage of pelvic abscess  Active  [email or web address] Liver biopsy  07/27/2017    Active  [email or web address]  Sigmoidectomy  07/27/2017    Active  Armando Brower  Exploratory laparotomy  07/27/2017  drainage of pelvic abscess  Active  Armando Brower  Ileostomy  07/27/2017    Active  Armando Brower  Mobilization of splenic flexure  07/27/2017    Active  Armando Brower  Small bowel resection, partial  07/27/2017    Active  Leonarda, Armando

## 2017-07-28 ENCOUNTER — TRANSCRIPTION ENCOUNTER (OUTPATIENT)
Age: 77
End: 2017-07-28

## 2017-07-28 LAB
ANION GAP SERPL CALC-SCNC: 8 MMOL/L — SIGNIFICANT CHANGE UP (ref 5–17)
BASOPHILS # BLD AUTO: 0 K/UL — SIGNIFICANT CHANGE UP (ref 0–0.2)
BASOPHILS NFR BLD AUTO: 0.2 % — SIGNIFICANT CHANGE UP (ref 0–2)
BUN SERPL-MCNC: 17 MG/DL — SIGNIFICANT CHANGE UP (ref 8–20)
CALCIUM SERPL-MCNC: 8.2 MG/DL — LOW (ref 8.6–10.2)
CHLORIDE SERPL-SCNC: 102 MMOL/L — SIGNIFICANT CHANGE UP (ref 98–107)
CO2 SERPL-SCNC: 26 MMOL/L — SIGNIFICANT CHANGE UP (ref 22–29)
CREAT SERPL-MCNC: 0.5 MG/DL — SIGNIFICANT CHANGE UP (ref 0.5–1.3)
EOSINOPHIL # BLD AUTO: 0 K/UL — SIGNIFICANT CHANGE UP (ref 0–0.5)
EOSINOPHIL NFR BLD AUTO: 0.2 % — SIGNIFICANT CHANGE UP (ref 0–6)
GLUCOSE SERPL-MCNC: 133 MG/DL — HIGH (ref 70–115)
HCT VFR BLD CALC: 26 % — LOW (ref 37–47)
HCT VFR BLD CALC: 27.8 % — LOW (ref 37–47)
HGB BLD-MCNC: 8.6 G/DL — LOW (ref 12–16)
HGB BLD-MCNC: 9.3 G/DL — LOW (ref 12–16)
LYMPHOCYTES # BLD AUTO: 1.6 K/UL — SIGNIFICANT CHANGE UP (ref 1–4.8)
LYMPHOCYTES # BLD AUTO: 9.1 % — LOW (ref 20–55)
MAGNESIUM SERPL-MCNC: 1.7 MG/DL — SIGNIFICANT CHANGE UP (ref 1.6–2.6)
MCHC RBC-ENTMCNC: 28.8 PG — SIGNIFICANT CHANGE UP (ref 27–31)
MCHC RBC-ENTMCNC: 30 PG — SIGNIFICANT CHANGE UP (ref 27–31)
MCHC RBC-ENTMCNC: 33.1 G/DL — SIGNIFICANT CHANGE UP (ref 32–36)
MCHC RBC-ENTMCNC: 33.5 G/DL — SIGNIFICANT CHANGE UP (ref 32–36)
MCV RBC AUTO: 87 FL — SIGNIFICANT CHANGE UP (ref 81–99)
MCV RBC AUTO: 89.7 FL — SIGNIFICANT CHANGE UP (ref 81–99)
MONOCYTES # BLD AUTO: 1 K/UL — HIGH (ref 0–0.8)
MONOCYTES NFR BLD AUTO: 6 % — SIGNIFICANT CHANGE UP (ref 3–10)
NEUTROPHILS # BLD AUTO: 14.4 K/UL — HIGH (ref 1.8–8)
NEUTROPHILS NFR BLD AUTO: 83.9 % — HIGH (ref 37–73)
PHOSPHATE SERPL-MCNC: 3.3 MG/DL — SIGNIFICANT CHANGE UP (ref 2.4–4.7)
PLATELET # BLD AUTO: 297 K/UL — SIGNIFICANT CHANGE UP (ref 150–400)
PLATELET # BLD AUTO: 393 K/UL — SIGNIFICANT CHANGE UP (ref 150–400)
POTASSIUM SERPL-MCNC: 4.4 MMOL/L — SIGNIFICANT CHANGE UP (ref 3.5–5.3)
POTASSIUM SERPL-SCNC: 4.4 MMOL/L — SIGNIFICANT CHANGE UP (ref 3.5–5.3)
RBC # BLD: 2.99 M/UL — LOW (ref 4.4–5.2)
RBC # BLD: 3.1 M/UL — LOW (ref 4.4–5.2)
RBC # FLD: 12.3 % — SIGNIFICANT CHANGE UP (ref 11–15.6)
RBC # FLD: 12.7 % — SIGNIFICANT CHANGE UP (ref 11–15.6)
SODIUM SERPL-SCNC: 136 MMOL/L — SIGNIFICANT CHANGE UP (ref 135–145)
WBC # BLD: 15.5 K/UL — HIGH (ref 4.8–10.8)
WBC # BLD: 17.1 K/UL — HIGH (ref 4.8–10.8)
WBC # FLD AUTO: 15.5 K/UL — HIGH (ref 4.8–10.8)
WBC # FLD AUTO: 17.1 K/UL — HIGH (ref 4.8–10.8)

## 2017-07-28 RX ORDER — MAGNESIUM SULFATE 500 MG/ML
2 VIAL (ML) INJECTION ONCE
Qty: 0 | Refills: 0 | Status: COMPLETED | OUTPATIENT
Start: 2017-07-28 | End: 2017-07-28

## 2017-07-28 RX ADMIN — Medication 5 MILLIGRAM(S): at 05:42

## 2017-07-28 RX ADMIN — ENOXAPARIN SODIUM 40 MILLIGRAM(S): 100 INJECTION SUBCUTANEOUS at 17:07

## 2017-07-28 RX ADMIN — MEROPENEM 200 MILLIGRAM(S): 1 INJECTION INTRAVENOUS at 05:42

## 2017-07-28 RX ADMIN — Medication 5 MILLIGRAM(S): at 17:08

## 2017-07-28 RX ADMIN — HYDROMORPHONE HYDROCHLORIDE 30 MILLILITER(S): 2 INJECTION INTRAMUSCULAR; INTRAVENOUS; SUBCUTANEOUS at 19:23

## 2017-07-28 RX ADMIN — Medication 50 GRAM(S): at 17:15

## 2017-07-28 RX ADMIN — HYDROMORPHONE HYDROCHLORIDE 30 MILLILITER(S): 2 INJECTION INTRAMUSCULAR; INTRAVENOUS; SUBCUTANEOUS at 07:38

## 2017-07-28 RX ADMIN — PANTOPRAZOLE SODIUM 40 MILLIGRAM(S): 20 TABLET, DELAYED RELEASE ORAL at 17:07

## 2017-07-28 RX ADMIN — MEROPENEM 200 MILLIGRAM(S): 1 INJECTION INTRAVENOUS at 18:10

## 2017-07-28 NOTE — PROGRESS NOTE ADULT - PROBLEM SELECTOR PLAN 1
1-Patient is stable, pain is controlled  2-Meds:   -Continue: Dilaudid PCA as ordered; until diet is advanced  3-Will continue to follow

## 2017-07-28 NOTE — PROGRESS NOTE ADULT - SUBJECTIVE AND OBJECTIVE BOX
INTERVAL HPI/OVERNIGHT EVENTS: No acute events overnight, patient is doing well, pain is well controlled, Patient reported that she felt she passed flatus in the colostomy bag. Denies any fever, chills, nausea, vomiting, chest pain or SOB.       STATUS POST: ex  lap, sigmoidectomy, primary anastomosis , diverting ileostomy, small bowel resection     POST OPERATIVE DAY #: 1      MEDICATIONS  (STANDING):  enoxaparin Injectable 40 milliGRAM(s) SubCutaneous daily  lactated ringers. 1000 milliLiter(s) (100 mL/Hr) IV Continuous <Continuous>  meropenem IVPB 1000 milliGRAM(s) IV Intermittent every 8 hours  metoprolol Injectable 5 milliGRAM(s) IV Push every 6 hours  pantoprazole  Injectable 40 milliGRAM(s) IV Push daily  HYDROmorphone PCA (1 mG/mL) 30 milliLiter(s) PCA Continuous PCA Continuous  magnesium sulfate  IVPB 2 Gram(s) IV Intermittent once    MEDICATIONS  (PRN):      Vital Signs Last 24 Hrs  T(C): 37.1 (28 Jul 2017 07:57), Max: 37.1 (28 Jul 2017 07:57)  T(F): 98.8 (28 Jul 2017 07:57), Max: 98.8 (28 Jul 2017 07:57)  HR: 89 (28 Jul 2017 07:57) (81 - 102)  BP: 154/69 (28 Jul 2017 07:57) (91/57 - 154/69)  BP(mean): --  RR: 16 (28 Jul 2017 07:57) (16 - 20)  SpO2: 94% (28 Jul 2017 07:57) (94% - 100%)    Physical Exam:    Neurological:  No sensory/motor deficits    HEENT: PERRLA, EOMI, no drainage or redness    Respiratory: Breath Sounds equal & clear to auscultation, no accessory muscle use    Cardiovascular: Regular rate & rhythm, normal S1, S2; no murmurs, gallops or rubs    Gastrointestinal: Soft, mildly distended, appropriately TTP, dressing with minimal SS drainage, colostomy in place p/p/i with minimal stools in bag. NGT with 75 cc bilious.     Extremities: No peripheral edema, No cyanosis, clubbing     Vascular: Equal and normal pulses: 2+ peripheral pulses throughout    Musculoskeletal: No joint pain, swelling or deformity; no limitation of movement    Skin: No rashes      I&O's Detail    27 Jul 2017 07:01  -  28 Jul 2017 07:00  --------------------------------------------------------  IN:    lactated ringers.: 1200 mL    Solution: 500 mL  Total IN: 1700 mL    OUT:    Colostomy: 50 mL    Indwelling Catheter - Urethral: 575 mL    Nasoenteral Tube: 75 mL  Total OUT: 700 mL    Total NET: 1000 mL      28 Jul 2017 07:01  -  28 Jul 2017 15:20  --------------------------------------------------------  IN:  Total IN: 0 mL    OUT:    Indwelling Catheter - Urethral: 250 mL  Total OUT: 250 mL    Total NET: -250 mL          LABS:                        9.3    17.1  )-----------( 393      ( 28 Jul 2017 12:46 )             27.8     07-28    136  |  102  |  17.0  ----------------------------<  133<H>  4.4   |  26.0  |  0.50    Ca    8.2<L>      28 Jul 2017 06:23  Phos  3.3     07-28  Mg     1.7     07-28            RADIOLOGY & ADDITIONAL STUDIES:

## 2017-07-28 NOTE — PROGRESS NOTE ADULT - SUBJECTIVE AND OBJECTIVE BOX
Chief Complaint: Incisional Pain    Patient seen and examined at bedside  PCA - Dilaudid/Fentanyl/Morphine  Pain moderately controlled with current medication regimen  No new events overnight    PAST MEDICAL & SURGICAL HISTORY:  Pacemaker  HTN (hypertension)  H/O lymphadenopathy      SOCIAL HISTORY:  [ ] Denies Smoking, Alcohol, or Drug Use    Allergies    Cipro (Rash)  codeine (Faint)  IV Contrast (Rash)  Keflex (Rash)    Intolerances        PAIN MEDICATIONS:  HYDROmorphone PCA (1 mG/mL) 30 milliLiter(s) PCA Continuous PCA Continuous    Heme:  enoxaparin Injectable 40 milliGRAM(s) SubCutaneous daily    Antibiotics:  meropenem IVPB 1000 milliGRAM(s) IV Intermittent every 8 hours    Cardiac:  metoprolol Injectable 5 milliGRAM(s) IV Push every 6 hours    Pulmonary:    Endocrine    GI:  pantoprazole  Injectable 40 milliGRAM(s) IV Push daily    All Other Meds:  lactated ringers. 1000 milliLiter(s) IV Continuous <Continuous>      REVIEW OF SYSTEMS:  CONSTITUTIONAL: Negative fever,chills  NECK: No pain or stiffness  RESPIRATORY: No cough, wheezing,  No shortness of breath  GASTROINTESTINAL: No abdominal, No nausea, vomiting; No diarrhea or constipation.   SKIN: No itching, burning, rashes, or lesions   MUSCULOSKELETAL: No joint pain or swelling; No muscle, back, or extremity pain    PHYSICAL EXAM:    Vital Signs Last 24 Hrs  T(C): 37.1 (28 Jul 2017 07:57), Max: 37.1 (28 Jul 2017 07:57)  T(F): 98.8 (28 Jul 2017 07:57), Max: 98.8 (28 Jul 2017 07:57)  HR: 89 (28 Jul 2017 07:57) (81 - 102)  BP: 154/69 (28 Jul 2017 07:57) (88/43 - 154/69)  BP(mean): --  RR: 16 (28 Jul 2017 07:57) (11 - 21)  SpO2: 94% (28 Jul 2017 07:57) (94% - 100%)    PAIN SCALE:       VNRS (Verbal Numerical Rating Scale) 1-10       GENERAL: Comfortable, in no apparent distress  HEENT:  Atraumatic, Normocephalic  NECK: Supple  LUNG: Respiration even and unlabored, no distress noted  ABDOMEN: Soft, Nontender, Nondistended; Dressing C/D/I  BACK: No midline bony tenderness to palpation, no step off or deformity noted.   EXTREMITIES:  FLORES X4, No clubbing, cyanosis, or edema  NEUROLOGIC: Awake, alert and oriented X3  SKIN: Warm and Dry    LABS:                          8.6    15.5  )-----------( 297      ( 28 Jul 2017 06:23 )             26.0     07-28    136  |  102  |  17.0  ----------------------------<  133<H>  4.4   |  26.0  |  0.50    Ca    8.2<L>      28 Jul 2017 06:23  Phos  3.3     07-28  Mg     1.7     07-28            Drug Screen:        RADIOLOGY:      [x ]  [place]  Reviewed and Copied to Chart Chief Complaint: Incisional Pain    Patient seen and examined at bedside  PCA - Dilaudid  Pain well controlled with current medication regimen, no adverse side effects, no adverse side effects  No new events overnight    PAST MEDICAL & SURGICAL HISTORY:  Pacemaker  HTN (hypertension)  H/O lymphadenopathy      SOCIAL HISTORY:  [ ] Denies Smoking, Alcohol, or Drug Use    Allergies    Cipro (Rash)  codeine (Faint)  IV Contrast (Rash)  Keflex (Rash)    Intolerances        PAIN MEDICATIONS:  HYDROmorphone PCA (1 mG/mL) 30 milliLiter(s) PCA Continuous PCA Continuous    Heme:  enoxaparin Injectable 40 milliGRAM(s) SubCutaneous daily    Antibiotics:  meropenem IVPB 1000 milliGRAM(s) IV Intermittent every 8 hours    Cardiac:  metoprolol Injectable 5 milliGRAM(s) IV Push every 6 hours    Pulmonary:    Endocrine    GI:  pantoprazole  Injectable 40 milliGRAM(s) IV Push daily    All Other Meds:  lactated ringers. 1000 milliLiter(s) IV Continuous <Continuous>      REVIEW OF SYSTEMS:  CONSTITUTIONAL: Negative fever, chills  NECK: No pain or stiffness  RESPIRATORY: No cough, wheezing,  No shortness of breath  GASTROINTESTINAL: Incisional  abdominal pain, No nausea, vomiting; No diarrhea or constipation.   SKIN: No itching, burning, rashes, or lesions   MUSCULOSKELETAL: No joint pain or swelling; No muscle, back, or extremity pain    PHYSICAL EXAM:    Vital Signs Last 24 Hrs  T(C): 37.1 (28 Jul 2017 07:57), Max: 37.1 (28 Jul 2017 07:57)  T(F): 98.8 (28 Jul 2017 07:57), Max: 98.8 (28 Jul 2017 07:57)  HR: 89 (28 Jul 2017 07:57) (81 - 102)  BP: 154/69 (28 Jul 2017 07:57) (88/43 - 154/69)  BP(mean): --  RR: 16 (28 Jul 2017 07:57) (11 - 21)  SpO2: 94% (28 Jul 2017 07:57) (94% - 100%)    PAIN SCALE:   3/4    VNRS (Verbal Numerical Rating Scale) 1-10       GENERAL: Comfortable, in no apparent distress  HEENT:  Atraumatic, Normocephalic  NECK: Supple  LUNG: Respiration even and unlabored, no distress noted  ABDOMEN: Soft, Nontender, Nondistended; Dressing C/D/I  BACK: No midline bony tenderness to palpation, no step off or deformity noted.   EXTREMITIES:  FLORES X4, No clubbing, cyanosis, or edema  NEUROLOGIC: Awake, alert and oriented X3  SKIN: Warm and Dry    LABS:                          8.6    15.5  )-----------( 297      ( 28 Jul 2017 06:23 )             26.0     07-28    136  |  102  |  17.0  ----------------------------<  133<H>  4.4   |  26.0  |  0.50    Ca    8.2<L>      28 Jul 2017 06:23  Phos  3.3     07-28  Mg     1.7     07-28            Drug Screen:        RADIOLOGY:      [x ]  [place]  Reviewed and Copied to Chart

## 2017-07-28 NOTE — PROGRESS NOTE ADULT - PROBLEM SELECTOR PLAN 1
- Pain control: Dilaudid PCA.   -CVS: HTN, c/w IV metoprolol.   -resp: ISx10/hr.   -GI: NPO DC NGT, c/w protonix.  serial abdominal exams.   - : darlene Coker PRN, c/w IVF.   -Heme: FU CBC H/H stable   -ID: c/w meropenem.   -DVT ppx: SCDs and lovenox.   Patient seen and examined with attending who agrees on above plan.

## 2017-07-29 DIAGNOSIS — I10 ESSENTIAL (PRIMARY) HYPERTENSION: ICD-10-CM

## 2017-07-29 LAB
-  AMIKACIN: SIGNIFICANT CHANGE UP
-  AMPICILLIN/SULBACTAM: SIGNIFICANT CHANGE UP
-  AMPICILLIN: SIGNIFICANT CHANGE UP
-  AZTREONAM: SIGNIFICANT CHANGE UP
-  CEFAZOLIN: SIGNIFICANT CHANGE UP
-  CEFEPIME: SIGNIFICANT CHANGE UP
-  CEFOXITIN: SIGNIFICANT CHANGE UP
-  CEFTAZIDIME: SIGNIFICANT CHANGE UP
-  CEFTRIAXONE: SIGNIFICANT CHANGE UP
-  CIPROFLOXACIN: SIGNIFICANT CHANGE UP
-  ERTAPENEM: SIGNIFICANT CHANGE UP
-  GENTAMICIN: SIGNIFICANT CHANGE UP
-  IMIPENEM: SIGNIFICANT CHANGE UP
-  LEVOFLOXACIN: SIGNIFICANT CHANGE UP
-  MEROPENEM: SIGNIFICANT CHANGE UP
-  PIPERACILLIN/TAZOBACTAM: SIGNIFICANT CHANGE UP
-  TOBRAMYCIN: SIGNIFICANT CHANGE UP
-  TRIMETHOPRIM/SULFAMETHOXAZOLE: SIGNIFICANT CHANGE UP
ANION GAP SERPL CALC-SCNC: 11 MMOL/L — SIGNIFICANT CHANGE UP (ref 5–17)
BASOPHILS # BLD AUTO: 0 K/UL — SIGNIFICANT CHANGE UP (ref 0–0.2)
BASOPHILS NFR BLD AUTO: 0.1 % — SIGNIFICANT CHANGE UP (ref 0–2)
BUN SERPL-MCNC: 17 MG/DL — SIGNIFICANT CHANGE UP (ref 8–20)
CALCIUM SERPL-MCNC: 8 MG/DL — LOW (ref 8.6–10.2)
CHLORIDE SERPL-SCNC: 106 MMOL/L — SIGNIFICANT CHANGE UP (ref 98–107)
CO2 SERPL-SCNC: 22 MMOL/L — SIGNIFICANT CHANGE UP (ref 22–29)
CREAT SERPL-MCNC: 0.39 MG/DL — LOW (ref 0.5–1.3)
EOSINOPHIL # BLD AUTO: 0.2 K/UL — SIGNIFICANT CHANGE UP (ref 0–0.5)
EOSINOPHIL NFR BLD AUTO: 1.1 % — SIGNIFICANT CHANGE UP (ref 0–6)
GLUCOSE SERPL-MCNC: 93 MG/DL — SIGNIFICANT CHANGE UP (ref 70–115)
HCT VFR BLD CALC: 24.1 % — LOW (ref 37–47)
HGB BLD-MCNC: 7.9 G/DL — LOW (ref 12–16)
LYMPHOCYTES # BLD AUTO: 1.6 K/UL — SIGNIFICANT CHANGE UP (ref 1–4.8)
LYMPHOCYTES # BLD AUTO: 10.9 % — LOW (ref 20–55)
MAGNESIUM SERPL-MCNC: 2.1 MG/DL — SIGNIFICANT CHANGE UP (ref 1.6–2.6)
MCHC RBC-ENTMCNC: 29.4 PG — SIGNIFICANT CHANGE UP (ref 27–31)
MCHC RBC-ENTMCNC: 32.8 G/DL — SIGNIFICANT CHANGE UP (ref 32–36)
MCV RBC AUTO: 89.6 FL — SIGNIFICANT CHANGE UP (ref 81–99)
METHOD TYPE: SIGNIFICANT CHANGE UP
MONOCYTES # BLD AUTO: 0.5 K/UL — SIGNIFICANT CHANGE UP (ref 0–0.8)
MONOCYTES NFR BLD AUTO: 3.8 % — SIGNIFICANT CHANGE UP (ref 3–10)
NEUTROPHILS # BLD AUTO: 11.9 K/UL — HIGH (ref 1.8–8)
NEUTROPHILS NFR BLD AUTO: 83.8 % — HIGH (ref 37–73)
PHOSPHATE SERPL-MCNC: 1.6 MG/DL — LOW (ref 2.4–4.7)
PLATELET # BLD AUTO: 365 K/UL — SIGNIFICANT CHANGE UP (ref 150–400)
POTASSIUM SERPL-MCNC: 4.2 MMOL/L — SIGNIFICANT CHANGE UP (ref 3.5–5.3)
POTASSIUM SERPL-SCNC: 4.2 MMOL/L — SIGNIFICANT CHANGE UP (ref 3.5–5.3)
RBC # BLD: 2.69 M/UL — LOW (ref 4.4–5.2)
RBC # FLD: 13.1 % — SIGNIFICANT CHANGE UP (ref 11–15.6)
SODIUM SERPL-SCNC: 139 MMOL/L — SIGNIFICANT CHANGE UP (ref 135–145)
WBC # BLD: 14.2 K/UL — HIGH (ref 4.8–10.8)
WBC # FLD AUTO: 14.2 K/UL — HIGH (ref 4.8–10.8)

## 2017-07-29 PROCEDURE — 93010 ELECTROCARDIOGRAM REPORT: CPT

## 2017-07-29 RX ORDER — LOSARTAN POTASSIUM 100 MG/1
50 TABLET, FILM COATED ORAL DAILY
Qty: 0 | Refills: 0 | Status: DISCONTINUED | OUTPATIENT
Start: 2017-07-29 | End: 2017-08-01

## 2017-07-29 RX ORDER — CARVEDILOL PHOSPHATE 80 MG/1
3.12 CAPSULE, EXTENDED RELEASE ORAL EVERY 12 HOURS
Qty: 0 | Refills: 0 | Status: DISCONTINUED | OUTPATIENT
Start: 2017-07-29 | End: 2017-08-01

## 2017-07-29 RX ADMIN — Medication 63.75 MILLIMOLE(S): at 20:02

## 2017-07-29 RX ADMIN — MEROPENEM 200 MILLIGRAM(S): 1 INJECTION INTRAVENOUS at 02:34

## 2017-07-29 RX ADMIN — HYDROMORPHONE HYDROCHLORIDE 30 MILLILITER(S): 2 INJECTION INTRAMUSCULAR; INTRAVENOUS; SUBCUTANEOUS at 07:02

## 2017-07-29 RX ADMIN — Medication 5 MILLIGRAM(S): at 06:05

## 2017-07-29 RX ADMIN — MEROPENEM 200 MILLIGRAM(S): 1 INJECTION INTRAVENOUS at 09:45

## 2017-07-29 RX ADMIN — ENOXAPARIN SODIUM 40 MILLIGRAM(S): 100 INJECTION SUBCUTANEOUS at 13:49

## 2017-07-29 RX ADMIN — SODIUM CHLORIDE 100 MILLILITER(S): 9 INJECTION, SOLUTION INTRAVENOUS at 00:12

## 2017-07-29 RX ADMIN — Medication 5 MILLIGRAM(S): at 00:10

## 2017-07-29 RX ADMIN — MEROPENEM 200 MILLIGRAM(S): 1 INJECTION INTRAVENOUS at 22:29

## 2017-07-29 RX ADMIN — PANTOPRAZOLE SODIUM 40 MILLIGRAM(S): 20 TABLET, DELAYED RELEASE ORAL at 13:49

## 2017-07-29 RX ADMIN — Medication 5 MILLIGRAM(S): at 18:36

## 2017-07-29 RX ADMIN — Medication 5 MILLIGRAM(S): at 13:50

## 2017-07-29 RX ADMIN — HYDROMORPHONE HYDROCHLORIDE 30 MILLILITER(S): 2 INJECTION INTRAMUSCULAR; INTRAVENOUS; SUBCUTANEOUS at 19:38

## 2017-07-29 RX ADMIN — SODIUM CHLORIDE 100 MILLILITER(S): 9 INJECTION, SOLUTION INTRAVENOUS at 09:44

## 2017-07-29 RX ADMIN — MEROPENEM 200 MILLIGRAM(S): 1 INJECTION INTRAVENOUS at 13:42

## 2017-07-29 NOTE — PROGRESS NOTE ADULT - PROBLEM SELECTOR PLAN 1
1. Meds:    -Patient's pain is well controlled with Dilaudid PCA.     - Will continued PCA as ordered until diet is advanced. Patient still NPO at this time.  2. Will continue to monitor.  3. Call with questions.

## 2017-07-29 NOTE — PROGRESS NOTE ADULT - PROBLEM SELECTOR PLAN 1
- Diet advanced to clears today - will monitor tolerance  - Serial abdominal exams  - Packing to be changed at bedside daily by ACS team  - Pain control with PCA - pain management note appreciated  - Encourage ambulation as tolerated - physical therapy ordered to assist with mobilization  - DVT ppx with lovenox and SCDs  - Encourage incentive spirometer use for pulm toileting

## 2017-07-29 NOTE — PROGRESS NOTE ADULT - SUBJECTIVE AND OBJECTIVE BOX
Chief Complaint: Incisional pain    Patient is POD #2 s/p ex  lap, sigmoidectomy, primary anastomosis , diverting ileostomy, small bowel resection. Patient seen and examined at bedside. Patient was sitting in chair next to bed resting comfortably. Patient is reporting that pain well managed on Dilaudid PCA. She is reporting pain decreased to 4/10 at best with medications. Denies any side effects or adverse effects from medications. No new events overnight.     PAST MEDICAL & SURGICAL HISTORY:  Pacemaker  HTN (hypertension)  H/O lymphadenopathy      SOCIAL HISTORY:  [ ] Denies Smoking, Alcohol, or Drug Use    Allergies    Cipro (Rash)  codeine (Faint)  IV Contrast (Rash)  Keflex (Rash)    Intolerances        PAIN MEDICATIONS:  HYDROmorphone PCA (1 mG/mL) 30 milliLiter(s) PCA Continuous PCA Continuous    Heme:  enoxaparin Injectable 40 milliGRAM(s) SubCutaneous daily    Antibiotics:  meropenem IVPB 1000 milliGRAM(s) IV Intermittent every 8 hours    Cardiac:  metoprolol Injectable 5 milliGRAM(s) IV Push every 6 hours    Pulmonary:    Endocrine    GI:  pantoprazole  Injectable 40 milliGRAM(s) IV Push daily    All Other Meds:  lactated ringers. 1000 milliLiter(s) IV Continuous <Continuous>      REVIEW OF SYSTEMS:  CONSTITUTIONAL: Negative fever,chills  NECK: No pain or stiffness  RESPIRATORY: No cough, wheezing,  No shortness of breath  GASTROINTESTINAL: Incisional abdominal pain No nausea, vomiting; No diarrhea or constipation.   SKIN: No itching, burning, rashes, or lesions   MUSCULOSKELETAL: No joint pain or swelling; No muscle, back, or extremity pain    PHYSICAL EXAM:    Vital Signs Last 24 Hrs  T(C): 36.8 (29 Jul 2017 08:20), Max: 36.9 (28 Jul 2017 15:57)  T(F): 98.3 (29 Jul 2017 08:20), Max: 98.5 (28 Jul 2017 23:41)  HR: 92 (29 Jul 2017 08:20) (86 - 104)  BP: 168/64 (29 Jul 2017 08:20) (106/54 - 168/64)  BP(mean): --  RR: 16 (29 Jul 2017 08:20) (16 - 18)  SpO2: 95% (29 Jul 2017 08:20) (95% - 97%)    PAIN SCORE:    4     SCALE USED: (1-10 VNRS)       GENERAL: Comfortable, in no apparent distress  HEENT:  Atraumatic, Normocephalic, PERRL, EOMI  LUNG: Respiration even and unlabored, no distress noted  NECK: Supple  ABDOMEN: Soft, Nontender, Nondistended; Dressing C/D/I  BACK: No midline bony tenderness to palpation, no step off or deformity noted.   EXTREMITIES:  FLORES X 4; No clubbing, cyanosis, or edema  NEUROLOGIC: Awake, alert and oriented X3  SKIN: Warm and Dry    LABS:                          9.3    17.1  )-----------( 393      ( 28 Jul 2017 12:46 )             27.8     07-29    139  |  106  |  17.0  ----------------------------<  93  4.2   |  22.0  |  0.39<L>    Ca    8.0<L>      29 Jul 2017 07:51  Phos  1.6     07-29  Mg     2.1     07-29            Drug Screen:        RADIOLOGY:      [ ]  LANCE  Reviewed and Copied to Chart

## 2017-07-29 NOTE — PROGRESS NOTE ADULT - SUBJECTIVE AND OBJECTIVE BOX
HPI/OVERNIGHT EVENTS: Patient seen and examined at bedside this morning. She reports feeling well today, states that PCA has been giving adequate pain control. Denies nausea / vomiting at this time. Has been urinating without difficulty since mendoza catheter removed yesterday. Denies fever, chills, CP, or SOB.    MEDICATIONS  (STANDING):  enoxaparin Injectable 40 milliGRAM(s) SubCutaneous daily  lactated ringers. 1000 milliLiter(s) (100 mL/Hr) IV Continuous <Continuous>  meropenem IVPB 1000 milliGRAM(s) IV Intermittent every 8 hours  metoprolol Injectable 5 milliGRAM(s) IV Push every 6 hours  pantoprazole  Injectable 40 milliGRAM(s) IV Push daily  HYDROmorphone PCA (1 mG/mL) 30 milliLiter(s) PCA Continuous PCA Continuous    MEDICATIONS  (PRN):      Vital Signs Last 24 Hrs  T(C): 36.8 (29 Jul 2017 08:20), Max: 36.9 (28 Jul 2017 15:57)  T(F): 98.3 (29 Jul 2017 08:20), Max: 98.5 (28 Jul 2017 23:41)  HR: 92 (29 Jul 2017 08:20) (86 - 104)  BP: 109/61 (29 Jul 2017 13:51) (106/54 - 168/64)  BP(mean): --  RR: 81 (29 Jul 2017 13:51) (16 - 81)  SpO2: 95% (29 Jul 2017 08:20) (95% - 97%)    Constitutional: patient resting comfortably in bed, in no acute distress  HEENT: EOMI / PERRL b/l, no active drainage or redness  Neck: No JVD, full ROM without pain  Respiratory: respirations are unlabored, no accessory muscle use, no conversational dyspnea  Cardiovascular: regular rate & rhythm  Gastrointestinal: abdomen soft and non-distended, mild ruth ann-incisional TTP, ostomy in place, stoma pink and viable, small amount of gas and liquid stool in bag, midline dressing with overlying dressing C/D/I, packing to be changed at bedside by ACS team, no rebound tenderness / guarding  Neurological: GCS: 15 (4/5/6). A&O x 3; no gross sensory / motor / coordination deficits  Psychiatric: Normal mood, normal affect  Musculoskeletal: No joint pain, swelling or deformity; no limitation of movement      I&O's Detail    28 Jul 2017 07:01  -  29 Jul 2017 07:00  --------------------------------------------------------  IN:    lactated ringers.: 2200 mL  Total IN: 2200 mL    OUT:    Colostomy: 50 mL    Indwelling Catheter - Urethral: 250 mL    Voided: 100 mL  Total OUT: 400 mL    Total NET: 1800 mL      29 Jul 2017 07:01  -  29 Jul 2017 14:30  --------------------------------------------------------  IN:    lactated ringers.: 300 mL  Total IN: 300 mL    OUT:  Total OUT: 0 mL    Total NET: 300 mL          LABS:                        7.9    14.2  )-----------( 365      ( 29 Jul 2017 11:52 )             24.1     07-29    139  |  106  |  17.0  ----------------------------<  93  4.2   |  22.0  |  0.39<L>    Ca    8.0<L>      29 Jul 2017 07:51  Phos  1.6     07-29  Mg     2.1     07-29

## 2017-07-29 NOTE — PROGRESS NOTE ADULT - PROBLEM SELECTOR PLAN 2
- Continue metoprolol 5mg q6 hrs, hold for SBP < 100 and/or HR < 60. Will transition to PO medications once patient is confirmed to have tolerated CLD

## 2017-07-30 LAB
-  AMPICILLIN: SIGNIFICANT CHANGE UP
-  ERYTHROMYCIN: SIGNIFICANT CHANGE UP
-  TETRACYCLINE: SIGNIFICANT CHANGE UP
-  VANCOMYCIN: SIGNIFICANT CHANGE UP
ANION GAP SERPL CALC-SCNC: 7 MMOL/L — SIGNIFICANT CHANGE UP (ref 5–17)
BASOPHILS # BLD AUTO: 0.1 K/UL — SIGNIFICANT CHANGE UP (ref 0–0.2)
BASOPHILS NFR BLD AUTO: 0.4 % — SIGNIFICANT CHANGE UP (ref 0–2)
BUN SERPL-MCNC: 13 MG/DL — SIGNIFICANT CHANGE UP (ref 8–20)
CALCIUM SERPL-MCNC: 7.9 MG/DL — LOW (ref 8.6–10.2)
CHLORIDE SERPL-SCNC: 103 MMOL/L — SIGNIFICANT CHANGE UP (ref 98–107)
CO2 SERPL-SCNC: 30 MMOL/L — HIGH (ref 22–29)
CREAT SERPL-MCNC: 0.31 MG/DL — LOW (ref 0.5–1.3)
EOSINOPHIL # BLD AUTO: 0.3 K/UL — SIGNIFICANT CHANGE UP (ref 0–0.5)
EOSINOPHIL NFR BLD AUTO: 2.3 % — SIGNIFICANT CHANGE UP (ref 0–6)
GLUCOSE SERPL-MCNC: 99 MG/DL — SIGNIFICANT CHANGE UP (ref 70–115)
HCT VFR BLD CALC: 22.1 % — LOW (ref 37–47)
HCT VFR BLD CALC: 23.9 % — LOW (ref 37–47)
HGB BLD-MCNC: 7 G/DL — CRITICAL LOW (ref 12–16)
HGB BLD-MCNC: 8 G/DL — LOW (ref 12–16)
LYMPHOCYTES # BLD AUTO: 1 K/UL — SIGNIFICANT CHANGE UP (ref 1–4.8)
LYMPHOCYTES # BLD AUTO: 7.8 % — LOW (ref 20–55)
MAGNESIUM SERPL-MCNC: 2 MG/DL — SIGNIFICANT CHANGE UP (ref 1.6–2.6)
MCHC RBC-ENTMCNC: 28.9 PG — SIGNIFICANT CHANGE UP (ref 27–31)
MCHC RBC-ENTMCNC: 29.1 PG — SIGNIFICANT CHANGE UP (ref 27–31)
MCHC RBC-ENTMCNC: 31.7 G/DL — LOW (ref 32–36)
MCHC RBC-ENTMCNC: 33.5 G/DL — SIGNIFICANT CHANGE UP (ref 32–36)
MCV RBC AUTO: 86.9 FL — SIGNIFICANT CHANGE UP (ref 81–99)
MCV RBC AUTO: 91.3 FL — SIGNIFICANT CHANGE UP (ref 81–99)
METHOD TYPE: SIGNIFICANT CHANGE UP
MONOCYTES # BLD AUTO: 0.4 K/UL — SIGNIFICANT CHANGE UP (ref 0–0.8)
MONOCYTES NFR BLD AUTO: 3.2 % — SIGNIFICANT CHANGE UP (ref 3–10)
NEUTROPHILS # BLD AUTO: 11.6 K/UL — HIGH (ref 1.8–8)
NEUTROPHILS NFR BLD AUTO: 86 % — HIGH (ref 37–73)
PHOSPHATE SERPL-MCNC: 2 MG/DL — LOW (ref 2.4–4.7)
PLAT MORPH BLD: NORMAL — SIGNIFICANT CHANGE UP
PLATELET # BLD AUTO: 336 K/UL — SIGNIFICANT CHANGE UP (ref 150–400)
PLATELET # BLD AUTO: 343 K/UL — SIGNIFICANT CHANGE UP (ref 150–400)
POTASSIUM SERPL-MCNC: 3.5 MMOL/L — SIGNIFICANT CHANGE UP (ref 3.5–5.3)
POTASSIUM SERPL-SCNC: 3.5 MMOL/L — SIGNIFICANT CHANGE UP (ref 3.5–5.3)
RBC # BLD: 2.42 M/UL — LOW (ref 4.4–5.2)
RBC # BLD: 2.75 M/UL — LOW (ref 4.4–5.2)
RBC # FLD: 12.5 % — SIGNIFICANT CHANGE UP (ref 11–15.6)
RBC # FLD: 12.8 % — SIGNIFICANT CHANGE UP (ref 11–15.6)
RBC BLD AUTO: NORMAL — SIGNIFICANT CHANGE UP
SODIUM SERPL-SCNC: 140 MMOL/L — SIGNIFICANT CHANGE UP (ref 135–145)
WBC # BLD: 13.5 K/UL — HIGH (ref 4.8–10.8)
WBC # BLD: 8.7 K/UL — SIGNIFICANT CHANGE UP (ref 4.8–10.8)
WBC # FLD AUTO: 13.5 K/UL — HIGH (ref 4.8–10.8)
WBC # FLD AUTO: 8.7 K/UL — SIGNIFICANT CHANGE UP (ref 4.8–10.8)

## 2017-07-30 RX ORDER — PANTOPRAZOLE SODIUM 20 MG/1
40 TABLET, DELAYED RELEASE ORAL
Qty: 0 | Refills: 0 | Status: DISCONTINUED | OUTPATIENT
Start: 2017-07-30 | End: 2017-08-01

## 2017-07-30 RX ORDER — TRAMADOL HYDROCHLORIDE 50 MG/1
25 TABLET ORAL EVERY 4 HOURS
Qty: 0 | Refills: 0 | Status: DISCONTINUED | OUTPATIENT
Start: 2017-07-30 | End: 2017-08-01

## 2017-07-30 RX ORDER — POTASSIUM PHOSPHATE, MONOBASIC POTASSIUM PHOSPHATE, DIBASIC 236; 224 MG/ML; MG/ML
15 INJECTION, SOLUTION INTRAVENOUS ONCE
Qty: 0 | Refills: 0 | Status: COMPLETED | OUTPATIENT
Start: 2017-07-30 | End: 2017-07-30

## 2017-07-30 RX ORDER — ACETAMINOPHEN 500 MG
650 TABLET ORAL EVERY 6 HOURS
Qty: 0 | Refills: 0 | Status: DISCONTINUED | OUTPATIENT
Start: 2017-07-30 | End: 2017-08-01

## 2017-07-30 RX ADMIN — SODIUM CHLORIDE 100 MILLILITER(S): 9 INJECTION, SOLUTION INTRAVENOUS at 13:49

## 2017-07-30 RX ADMIN — ENOXAPARIN SODIUM 40 MILLIGRAM(S): 100 INJECTION SUBCUTANEOUS at 13:52

## 2017-07-30 RX ADMIN — CARVEDILOL PHOSPHATE 3.12 MILLIGRAM(S): 80 CAPSULE, EXTENDED RELEASE ORAL at 05:43

## 2017-07-30 RX ADMIN — POTASSIUM PHOSPHATE, MONOBASIC POTASSIUM PHOSPHATE, DIBASIC 62.5 MILLIMOLE(S): 236; 224 INJECTION, SOLUTION INTRAVENOUS at 17:01

## 2017-07-30 RX ADMIN — Medication 63.75 MILLIMOLE(S): at 02:22

## 2017-07-30 RX ADMIN — MEROPENEM 200 MILLIGRAM(S): 1 INJECTION INTRAVENOUS at 05:44

## 2017-07-30 RX ADMIN — MEROPENEM 200 MILLIGRAM(S): 1 INJECTION INTRAVENOUS at 21:11

## 2017-07-30 RX ADMIN — TRAMADOL HYDROCHLORIDE 25 MILLIGRAM(S): 50 TABLET ORAL at 20:10

## 2017-07-30 RX ADMIN — MEROPENEM 200 MILLIGRAM(S): 1 INJECTION INTRAVENOUS at 13:53

## 2017-07-30 RX ADMIN — PANTOPRAZOLE SODIUM 40 MILLIGRAM(S): 20 TABLET, DELAYED RELEASE ORAL at 13:52

## 2017-07-30 RX ADMIN — TRAMADOL HYDROCHLORIDE 25 MILLIGRAM(S): 50 TABLET ORAL at 21:10

## 2017-07-30 RX ADMIN — CARVEDILOL PHOSPHATE 3.12 MILLIGRAM(S): 80 CAPSULE, EXTENDED RELEASE ORAL at 17:01

## 2017-07-30 RX ADMIN — LOSARTAN POTASSIUM 50 MILLIGRAM(S): 100 TABLET, FILM COATED ORAL at 05:43

## 2017-07-30 NOTE — PHYSICAL THERAPY INITIAL EVALUATION ADULT - PERTINENT HX OF CURRENT PROBLEM, REHAB EVAL
pt presents to [facility] due to nausea and vomiting, s/p 7/27 liver biopsy, sigmoidectomy, exploratory laparotomy, pelvic abscess, ileostomy, mobilization of splenic flexure, small bowel resection, partial.

## 2017-07-30 NOTE — PHYSICAL THERAPY INITIAL EVALUATION ADULT - CRITERIA FOR SKILLED THERAPEUTIC INTERVENTIONS
therapy frequency/functional limitations in following categories/anticipated discharge recommendation/predicted duration of therapy intervention/impairments found/rehab potential

## 2017-07-30 NOTE — PROGRESS NOTE ADULT - PROBLEM SELECTOR PLAN 1
- Diet advanced to regular, IV locked   - Serial abdominal exams   - Monitor diet tolerance  - D/c'ed PCA as patient is tolerating PO intake  - Ordered for PO pain medications - will assess adequacy of pain control on new regimen and adjust as needed  - Ostomy nurse consult ordered to assist with ostomy education  - Liver bx results pending  - Encourage ambulation as tolerated - OOB to chair as much as patient will tolerate  - Physical therapy recs appreciated - home with home PT, stairs pending  - Incentive payal for pulm toileting  - DVT ppx with lovenox and SCD - Diet advanced to regular, IV locked   - Serial abdominal exams   - Monitor diet tolerance  - D/c'ed PCA as patient is tolerating PO intake  - Ordered for PO pain medications - will assess adequacy of pain control on new regimen and adjust as needed  - Daily packing changes to midline to be done by ACS team  - Ostomy nurse consult ordered to assist with ostomy education  - Liver bx results pending  - Encourage ambulation as tolerated - OOB to chair as much as patient will tolerate  - Physical therapy recs appreciated - home with home PT, stairs pending  - Incentive payal for pulm toileting  - DVT ppx with lovenox and SCD

## 2017-07-30 NOTE — PHYSICAL THERAPY INITIAL EVALUATION ADULT - ADDITIONAL COMMENTS
owns no DME, was very active prior to admission(drove, belonged to many community social gatherings), 5+5 steps to enter home c rail

## 2017-07-30 NOTE — PROGRESS NOTE ADULT - SUBJECTIVE AND OBJECTIVE BOX
HPI/OVERNIGHT EVENTS: Patient seen and examined this morning - late chart entry. She reports feeling well today, stating that pain has been well controlled and improving. She reports she has been tolerating CLD without increase in abd pain or nausea / vomiting. Admits to being OOB to chair and ambulating with assistance. Voiding independently without difficulty. She denies fever, chills, CP or SOB.    MEDICATIONS  (STANDING):  enoxaparin Injectable 40 milliGRAM(s) SubCutaneous daily  meropenem IVPB 1000 milliGRAM(s) IV Intermittent every 8 hours  carvedilol 3.125 milliGRAM(s) Oral every 12 hours  losartan 50 milliGRAM(s) Oral daily  potassium phosphate IVPB 15 milliMole(s) IV Intermittent once  pantoprazole    Tablet 40 milliGRAM(s) Oral before breakfast    MEDICATIONS  (PRN):  acetaminophen   Tablet. 650 milliGRAM(s) Oral every 6 hours PRN Mild Pain (1 - 3)  traMADol 25 milliGRAM(s) Oral every 4 hours PRN Moderate Pain (4 - 6)      Vital Signs Last 24 Hrs  T(C): 36.8 (30 Jul 2017 07:57), Max: 36.9 (29 Jul 2017 17:20)  T(F): 98.2 (30 Jul 2017 07:57), Max: 98.5 (29 Jul 2017 17:20)  HR: 74 (30 Jul 2017 07:57) (74 - 105)  BP: 121/56 (30 Jul 2017 07:57) (114/63 - 139/61)  BP(mean): --  RR: 16 (30 Jul 2017 07:57) (16 - 18)  SpO2: 98% (30 Jul 2017 07:57) (97% - 99%)    Constitutional: patient resting comfortably in the chair, in no acute distress  HEENT: EOMI / PERRL b/l  Neck: No JVD, full ROM without pain  Respiratory: respirations are unlabored, no accessory muscle use, no conversational dyspnea  Cardiovascular: regular rate & rhythm  Gastrointestinal: abdomen soft, and non-distended, very mild TTP around incision site, incision is C/D/I with packing in place to be changed by ACS team daily, ostomy in place stoma is pink and viable with gas and small amount liquid stool in bag, no rebound tenderness / guarding  Neurological: GCS: 15 (4/5/6). A&O x 3; no gross sensory / motor / coordination deficits  Psychiatric: Normal mood, normal affect  \\    I&O's Detail    29 Jul 2017 07:01  -  30 Jul 2017 07:00  --------------------------------------------------------  IN:    lactated ringers.: 300 mL  Total IN: 300 mL    OUT:    Colostomy: 175 mL  Total OUT: 175 mL    Total NET: 125 mL      30 Jul 2017 07:01  -  30 Jul 2017 16:03  --------------------------------------------------------  IN:    lactated ringers.: 700 mL  Total IN: 700 mL    OUT:  Total OUT: 0 mL    Total NET: 700 mL          LABS:                        8.0    13.5  )-----------( 336      ( 30 Jul 2017 09:56 )             23.9     07-30    140  |  103  |  13.0  ----------------------------<  99  3.5   |  30.0<H>  |  0.31<L>    Ca    7.9<L>      30 Jul 2017 06:02  Phos  2.0     07-30  Mg     2.0     07-30 HPI/OVERNIGHT EVENTS: Patient seen and examined this morning - late chart entry. She reports feeling well today, stating that pain has been well controlled and improving. She reports she has been tolerating CLD without increase in abd pain or nausea / vomiting. Admits to being OOB to chair and ambulating with assistance. Voiding independently without difficulty. Of note, initial CBC this AM showed Hgb of 7.0 Patient denies feelings of lightheadedness, dizziness, CP, SOB. Repeat CBC sent, resulted with Hgb of 8.0 - no transfusion indicated    MEDICATIONS  (STANDING):  enoxaparin Injectable 40 milliGRAM(s) SubCutaneous daily  meropenem IVPB 1000 milliGRAM(s) IV Intermittent every 8 hours  carvedilol 3.125 milliGRAM(s) Oral every 12 hours  losartan 50 milliGRAM(s) Oral daily  potassium phosphate IVPB 15 milliMole(s) IV Intermittent once  pantoprazole    Tablet 40 milliGRAM(s) Oral before breakfast    MEDICATIONS  (PRN):  acetaminophen   Tablet. 650 milliGRAM(s) Oral every 6 hours PRN Mild Pain (1 - 3)  traMADol 25 milliGRAM(s) Oral every 4 hours PRN Moderate Pain (4 - 6)      Vital Signs Last 24 Hrs  T(C): 36.8 (30 Jul 2017 07:57), Max: 36.9 (29 Jul 2017 17:20)  T(F): 98.2 (30 Jul 2017 07:57), Max: 98.5 (29 Jul 2017 17:20)  HR: 74 (30 Jul 2017 07:57) (74 - 105)  BP: 121/56 (30 Jul 2017 07:57) (114/63 - 139/61)  BP(mean): --  RR: 16 (30 Jul 2017 07:57) (16 - 18)  SpO2: 98% (30 Jul 2017 07:57) (97% - 99%)    Constitutional: patient resting comfortably in the chair, in no acute distress  HEENT: EOMI / PERRL b/l  Neck: No JVD, full ROM without pain  Respiratory: respirations are unlabored, no accessory muscle use, no conversational dyspnea  Cardiovascular: regular rate & rhythm  Gastrointestinal: abdomen soft, and non-distended, very mild TTP around incision site, incision is C/D/I with packing in place to be changed by ACS team daily, ostomy in place stoma is pink and viable with gas and small amount liquid stool in bag, no rebound tenderness / guarding  Neurological: GCS: 15 (4/5/6). A&O x 3; no gross sensory / motor / coordination deficits  Psychiatric: Normal mood, normal affect  \\    I&O's Detail    29 Jul 2017 07:01  -  30 Jul 2017 07:00  --------------------------------------------------------  IN:    lactated ringers.: 300 mL  Total IN: 300 mL    OUT:    Colostomy: 175 mL  Total OUT: 175 mL    Total NET: 125 mL      30 Jul 2017 07:01  -  30 Jul 2017 16:03  --------------------------------------------------------  IN:    lactated ringers.: 700 mL  Total IN: 700 mL    OUT:  Total OUT: 0 mL    Total NET: 700 mL          LABS:                        8.0    13.5  )-----------( 336      ( 30 Jul 2017 09:56 )             23.9     07-30    140  |  103  |  13.0  ----------------------------<  99  3.5   |  30.0<H>  |  0.31<L>    Ca    7.9<L>      30 Jul 2017 06:02  Phos  2.0     07-30  Mg     2.0     07-30

## 2017-07-31 LAB
ANION GAP SERPL CALC-SCNC: 9 MMOL/L — SIGNIFICANT CHANGE UP (ref 5–17)
BUN SERPL-MCNC: 10 MG/DL — SIGNIFICANT CHANGE UP (ref 8–20)
CALCIUM SERPL-MCNC: 7.9 MG/DL — LOW (ref 8.6–10.2)
CHLORIDE SERPL-SCNC: 103 MMOL/L — SIGNIFICANT CHANGE UP (ref 98–107)
CO2 SERPL-SCNC: 29 MMOL/L — SIGNIFICANT CHANGE UP (ref 22–29)
CREAT SERPL-MCNC: 0.38 MG/DL — LOW (ref 0.5–1.3)
GLUCOSE SERPL-MCNC: 110 MG/DL — SIGNIFICANT CHANGE UP (ref 70–115)
HCT VFR BLD CALC: 22.8 % — LOW (ref 37–47)
HGB BLD-MCNC: 7.4 G/DL — LOW (ref 12–16)
MAGNESIUM SERPL-MCNC: 1.9 MG/DL — SIGNIFICANT CHANGE UP (ref 1.6–2.6)
MCHC RBC-ENTMCNC: 29.4 PG — SIGNIFICANT CHANGE UP (ref 27–31)
MCHC RBC-ENTMCNC: 32.5 G/DL — SIGNIFICANT CHANGE UP (ref 32–36)
MCV RBC AUTO: 90.5 FL — SIGNIFICANT CHANGE UP (ref 81–99)
PHOSPHATE SERPL-MCNC: 1.4 MG/DL — LOW (ref 2.4–4.7)
PLATELET # BLD AUTO: 405 K/UL — HIGH (ref 150–400)
POTASSIUM SERPL-MCNC: 3.5 MMOL/L — SIGNIFICANT CHANGE UP (ref 3.5–5.3)
POTASSIUM SERPL-SCNC: 3.5 MMOL/L — SIGNIFICANT CHANGE UP (ref 3.5–5.3)
RBC # BLD: 2.52 M/UL — LOW (ref 4.4–5.2)
RBC # FLD: 12.3 % — SIGNIFICANT CHANGE UP (ref 11–15.6)
SODIUM SERPL-SCNC: 141 MMOL/L — SIGNIFICANT CHANGE UP (ref 135–145)
WBC # BLD: 7.2 K/UL — SIGNIFICANT CHANGE UP (ref 4.8–10.8)
WBC # FLD AUTO: 7.2 K/UL — SIGNIFICANT CHANGE UP (ref 4.8–10.8)

## 2017-07-31 RX ORDER — MAGNESIUM SULFATE 500 MG/ML
2 VIAL (ML) INJECTION ONCE
Qty: 0 | Refills: 0 | Status: COMPLETED | OUTPATIENT
Start: 2017-07-31 | End: 2017-07-31

## 2017-07-31 RX ORDER — POTASSIUM CHLORIDE 20 MEQ
10 PACKET (EA) ORAL
Qty: 0 | Refills: 0 | Status: COMPLETED | OUTPATIENT
Start: 2017-07-31 | End: 2017-07-31

## 2017-07-31 RX ORDER — POTASSIUM PHOSPHATE, MONOBASIC POTASSIUM PHOSPHATE, DIBASIC 236; 224 MG/ML; MG/ML
15 INJECTION, SOLUTION INTRAVENOUS ONCE
Qty: 0 | Refills: 0 | Status: COMPLETED | OUTPATIENT
Start: 2017-07-31 | End: 2017-07-31

## 2017-07-31 RX ADMIN — LOSARTAN POTASSIUM 50 MILLIGRAM(S): 100 TABLET, FILM COATED ORAL at 05:16

## 2017-07-31 RX ADMIN — Medication 100 MILLIEQUIVALENT(S): at 17:41

## 2017-07-31 RX ADMIN — PANTOPRAZOLE SODIUM 40 MILLIGRAM(S): 20 TABLET, DELAYED RELEASE ORAL at 05:16

## 2017-07-31 RX ADMIN — TRAMADOL HYDROCHLORIDE 25 MILLIGRAM(S): 50 TABLET ORAL at 12:12

## 2017-07-31 RX ADMIN — MEROPENEM 200 MILLIGRAM(S): 1 INJECTION INTRAVENOUS at 21:44

## 2017-07-31 RX ADMIN — CARVEDILOL PHOSPHATE 3.12 MILLIGRAM(S): 80 CAPSULE, EXTENDED RELEASE ORAL at 17:42

## 2017-07-31 RX ADMIN — MEROPENEM 200 MILLIGRAM(S): 1 INJECTION INTRAVENOUS at 05:15

## 2017-07-31 RX ADMIN — Medication 100 MILLIEQUIVALENT(S): at 17:40

## 2017-07-31 RX ADMIN — TRAMADOL HYDROCHLORIDE 25 MILLIGRAM(S): 50 TABLET ORAL at 22:24

## 2017-07-31 RX ADMIN — TRAMADOL HYDROCHLORIDE 25 MILLIGRAM(S): 50 TABLET ORAL at 22:54

## 2017-07-31 RX ADMIN — TRAMADOL HYDROCHLORIDE 25 MILLIGRAM(S): 50 TABLET ORAL at 12:06

## 2017-07-31 RX ADMIN — MEROPENEM 200 MILLIGRAM(S): 1 INJECTION INTRAVENOUS at 12:06

## 2017-07-31 RX ADMIN — CARVEDILOL PHOSPHATE 3.12 MILLIGRAM(S): 80 CAPSULE, EXTENDED RELEASE ORAL at 05:16

## 2017-07-31 RX ADMIN — Medication 50 GRAM(S): at 17:41

## 2017-07-31 RX ADMIN — ENOXAPARIN SODIUM 40 MILLIGRAM(S): 100 INJECTION SUBCUTANEOUS at 12:01

## 2017-07-31 RX ADMIN — POTASSIUM PHOSPHATE, MONOBASIC POTASSIUM PHOSPHATE, DIBASIC 62.5 MILLIMOLE(S): 236; 224 INJECTION, SOLUTION INTRAVENOUS at 17:42

## 2017-07-31 NOTE — PROGRESS NOTE ADULT - ASSESSMENT
76 y/o female POD#3 s/p BR, sigmoidectomy, diverting loop ileostomy, and liver biopsy.
76 y/o female POD#4 s/p BR, sigmoidectomy, diverting loop ileostomy, and liver biopsy.
77 year old F with perforated diverticulitis with abscess going to the OR today
77YF with perforated sigmoid diverticulitis and abscess s/p ex lap sigmodectomy and primary anastomosis, small bowel resection , diverting ileostomy POD 1  Patient is doing well and is currently stable.
78 y/o female POD#2 s/p SBR, sigmoidectomy, diverting loop ileostomy, and liver bx
POD#2  78 y/o female with diverticulitis s/p: Exp lap; Sigmoidectomy Drainage Pelvic Abscess
POD#1  76 y/o female with diverticulitis s/p: Exp lap; Sigmoidectomy                                                     Drainage Pelvic Abscess

## 2017-07-31 NOTE — DIETITIAN INITIAL EVALUATION ADULT. - SIGNS/SYMPTOMS
as evidenced by ~5#wt loss over 3 weeks PTA, Now with post op fluid accumulation in B/L upper extrem

## 2017-07-31 NOTE — PROGRESS NOTE ADULT - SUBJECTIVE AND OBJECTIVE BOX
INTERVAL HPI/OVERNIGHT EVENTS: No acute events overnight, She is stating that pain has been well controlled and improving. She is tolerating regular diet without increase in abd pain or nausea / vomiting. ostomy functioning well.  Admits to being OOB to chair and ambulating with assistance. Voiding independently without difficulty.  Patient denies feelings of lightheadedness, dizziness, CP, SOB.  H/H stable.     STATUS POST:  ex tae , SBR, sigmoidectomy, DLI, liver biopsy    POST OPERATIVE DAY #: 4      MEDICATIONS  (STANDING):  enoxaparin Injectable 40 milliGRAM(s) SubCutaneous daily  meropenem IVPB 1000 milliGRAM(s) IV Intermittent every 8 hours  carvedilol 3.125 milliGRAM(s) Oral every 12 hours  losartan 50 milliGRAM(s) Oral daily  pantoprazole    Tablet 40 milliGRAM(s) Oral before breakfast  potassium chloride  10 mEq/100 mL IVPB 10 milliEquivalent(s) IV Intermittent every 1 hour  magnesium sulfate  IVPB 2 Gram(s) IV Intermittent once  potassium phosphate IVPB 15 milliMole(s) IV Intermittent once    MEDICATIONS  (PRN):  acetaminophen   Tablet. 650 milliGRAM(s) Oral every 6 hours PRN Mild Pain (1 - 3)  traMADol 25 milliGRAM(s) Oral every 4 hours PRN Moderate Pain (4 - 6)      Vital Signs Last 24 Hrs  T(C): 36.9 (31 Jul 2017 08:24), Max: 37.1 (30 Jul 2017 16:01)  T(F): 98.4 (31 Jul 2017 08:24), Max: 98.8 (30 Jul 2017 16:01)  HR: 93 (31 Jul 2017 08:24) (74 - 93)  BP: 164/68 (31 Jul 2017 08:24) (122/69 - 164/68)  BP(mean): --  RR: 18 (31 Jul 2017 08:24) (17 - 18)  SpO2: 92% (31 Jul 2017 08:24) (92% - 99%)    Physical Exam:    Constitutional: patient resting comfortably in the chair, in no acute distress  HEENT: EOMI / PERRL b/l  Neck: No JVD, full ROM without pain  Respiratory: respirations are unlabored, no accessory muscle use, no conversational dyspnea  Cardiovascular: regular rate & rhythm  Gastrointestinal: abdomen soft, and non-distended, very mild TTP around incision site, incision is C/D/I with packing in place to be changed by ACS team daily, ostomy in place stoma is pink and viable with gas and small amount liquid stool in bag, no rebound tenderness / guarding  Neurological: GCS: 15 (4/5/6). A&O x 3; no gross sensory / motor / coordination deficits  Psychiatric: Normal mood, normal affect    I&O's Detail    30 Jul 2017 07:01  -  31 Jul 2017 07:00  --------------------------------------------------------  IN:    lactated ringers.: 700 mL    Oral Fluid: 840 mL  Total IN: 1540 mL    OUT:    Colostomy: 275 mL  Total OUT: 275 mL    Total NET: 1265 mL      31 Jul 2017 07:01  -  31 Jul 2017 14:02  --------------------------------------------------------  IN:    Oral Fluid: 960 mL  Total IN: 960 mL    OUT:  Total OUT: 0 mL    Total NET: 960 mL          LABS:                        7.4    7.2   )-----------( 405      ( 31 Jul 2017 05:35 )             22.8     07-31    141  |  103  |  10.0  ----------------------------<  110  3.5   |  29.0  |  0.38<L>    Ca    7.9<L>      31 Jul 2017 05:35  Phos  1.4     07-31  Mg     1.9     07-31            RADIOLOGY & ADDITIONAL STUDIES: INTERVAL HPI/OVERNIGHT EVENTS: No acute events overnight, She is stating that pain has been well controlled and improving. She is tolerating regular diet without increase in abd pain or nausea / vomiting. ostomy functioning well.  Admits to being OOB to chair and ambulating with assistance. Voiding independently without difficulty.  Patient denies feelings of lightheadedness, dizziness, CP, SOB.  H/H stable.     STATUS POST:  ex lap , SBR, sigmoidectomy, DLI, liver biopsy    POST OPERATIVE DAY #: 4      MEDICATIONS  (STANDING):  enoxaparin Injectable 40 milliGRAM(s) SubCutaneous daily  meropenem IVPB 1000 milliGRAM(s) IV Intermittent every 8 hours  carvedilol 3.125 milliGRAM(s) Oral every 12 hours  losartan 50 milliGRAM(s) Oral daily  pantoprazole    Tablet 40 milliGRAM(s) Oral before breakfast  potassium chloride  10 mEq/100 mL IVPB 10 milliEquivalent(s) IV Intermittent every 1 hour  magnesium sulfate  IVPB 2 Gram(s) IV Intermittent once  potassium phosphate IVPB 15 milliMole(s) IV Intermittent once    MEDICATIONS  (PRN):  acetaminophen   Tablet. 650 milliGRAM(s) Oral every 6 hours PRN Mild Pain (1 - 3)  traMADol 25 milliGRAM(s) Oral every 4 hours PRN Moderate Pain (4 - 6)      Vital Signs Last 24 Hrs  T(C): 36.9 (31 Jul 2017 08:24), Max: 37.1 (30 Jul 2017 16:01)  T(F): 98.4 (31 Jul 2017 08:24), Max: 98.8 (30 Jul 2017 16:01)  HR: 93 (31 Jul 2017 08:24) (74 - 93)  BP: 164/68 (31 Jul 2017 08:24) (122/69 - 164/68)  BP(mean): --  RR: 18 (31 Jul 2017 08:24) (17 - 18)  SpO2: 92% (31 Jul 2017 08:24) (92% - 99%)    Physical Exam:    Constitutional: patient resting comfortably in the chair, in no acute distress  HEENT: EOMI / PERRL b/l  Neck: No JVD, full ROM without pain  Respiratory: respirations are unlabored, no accessory muscle use, no conversational dyspnea  Cardiovascular: regular rate & rhythm  Gastrointestinal: abdomen soft, and non-distended, very mild TTP around incision site, incision is C/D/I with packing in place to be changed by ACS team daily, ostomy in place stoma is pink and viable with gas and small amount liquid stool in bag, no rebound tenderness / guarding  Neurological: GCS: 15 (4/5/6). A&O x 3; no gross sensory / motor / coordination deficits  Psychiatric: Normal mood, normal affect    I&O's Detail    30 Jul 2017 07:01  -  31 Jul 2017 07:00  --------------------------------------------------------  IN:    lactated ringers.: 700 mL    Oral Fluid: 840 mL  Total IN: 1540 mL    OUT:    Colostomy: 275 mL  Total OUT: 275 mL    Total NET: 1265 mL      31 Jul 2017 07:01  -  31 Jul 2017 14:02  --------------------------------------------------------  IN:    Oral Fluid: 960 mL  Total IN: 960 mL    OUT:  Total OUT: 0 mL    Total NET: 960 mL          LABS:                        7.4    7.2   )-----------( 405      ( 31 Jul 2017 05:35 )             22.8     07-31    141  |  103  |  10.0  ----------------------------<  110  3.5   |  29.0  |  0.38<L>    Ca    7.9<L>      31 Jul 2017 05:35  Phos  1.4     07-31  Mg     1.9     07-31            RADIOLOGY & ADDITIONAL STUDIES:

## 2017-07-31 NOTE — PROGRESS NOTE ADULT - PROBLEM SELECTOR PLAN 1
-Pain control with PO pain medications as needed.   -volume overload 2/2 fluid resuscitation, patient was informed that she will mobilize that fluid.   -HTN: c/w home meds.   -IS x10/hr  -Regular diet  - Daily packing changes to midline to be done by ACS team  - Ostomy nurse consult ordered to assist with ostomy education  - Liver bx results pending  - Encourage ambulation as tolerated - OOB to chair as much as patient will tolerate  - PT  - DVT ppx with lovenox and SCD.   -Dispo to [facility] on case.   Patient seen and examined with attending who agrees on above plan. -Pain control with PO pain medications as needed.   -volume overload 2/2 fluid resuscitation, patient was informed that she will mobilize that fluid.   -HTN: c/w home meds.   -IS x10/hr  -Regular diet  - Daily dressing to midline incision  - Ostomy nurse consult ordered to assist with ostomy education  - Liver bx results pending  - Encourage ambulation as tolerated - OOB to chair as much as patient will tolerate  - PT  - DVT ppx with lovenox and SCD.   -Dispo home w/ PT, SW on case.   Patient seen and examined with attending who agrees on above plan.

## 2017-07-31 NOTE — PROGRESS NOTE ADULT - ATTENDING COMMENTS
Pt is a 77F s/p Liver biopsy, Sigmoidectomy, Exploratory laparotomy, drainage of pelvic abscess, Ileostomy, Mobilization of splenic flexure, and partial small bowel resection.   - Keep NPO  - cont IVF  - Lovenox, SCDs for DVT PPX  - manage pain  - f/u liver lymph node biopsy  - monitor NGT output  - monitor UOP
Pain under control.  abdomen's soft, non tender, stoma viable with minimal out put.  start liquids today.
Patient feels well but is concerned about swelling of legs and arms.  Tolerating diet.  Loop ileostomy functional.  Afebrile.  HD normal.  WBC: 7.2; K+ 3.5, replete.  Abdomen: S/NT/ND, dressing changed this morning.  Loop ileostomy functional.  Patient with anasarca and provided patient with reassurance that this will resolve with time.  Continue regular diet.  Stoma teaching.  PT, recommends home with PT.  Dispo planning.

## 2017-07-31 NOTE — PROGRESS NOTE ADULT - PROBLEM SELECTOR PROBLEM 1
Diverticulitis of intestine with abscess without bleeding, unspecified part of intestinal tract

## 2017-07-31 NOTE — DIETITIAN INITIAL EVALUATION ADULT. - OTHER INFO
Pt with nausea and vomiting for ~3 weeks secondary to diverticulitis, now post op sigmoidectomy, exp lap, drainage of pelvic abscess, SBR + ileostomy, Now with fluid accumulation

## 2017-07-31 NOTE — CHART NOTE - NSCHARTNOTEFT_GEN_A_CORE
Upon Nutritional Assessment by the Registered Dietitian your patient was determined to meet criteria / has evidence of the following diagnosis/diagnoses:          [ ]  Mild Protein Calorie Malnutrition        [x ]  Moderate Protein Calorie Malnutrition        [ ] Severe Protein Calorie Malnutrition        [ ] Unspecified Protein Calorie Malnutrition        [ ] Underweight / BMI <19        [ ] Morbid Obesity / BMI > 40      Findings as based on:  •  Comprehensive nutrition assessment and consultation  •  Calorie counts (nutrient intake analysis)  •  Food acceptance and intake status from observations by staff  •  Follow up  •  Patient education  •  Intervention secondary to interdisciplinary rounds  •   concerns      Treatment:    The following diet has been recommended:     Prostat BID     PROVIDER Section:     By signing this assessment you are acknowledging and agree with the diagnosis/diagnoses assigned by the Registered Dietitian    Comments:

## 2017-08-01 ENCOUNTER — TRANSCRIPTION ENCOUNTER (OUTPATIENT)
Age: 77
End: 2017-08-01

## 2017-08-01 VITALS
HEART RATE: 85 BPM | RESPIRATION RATE: 20 BRPM | DIASTOLIC BLOOD PRESSURE: 66 MMHG | TEMPERATURE: 98 F | SYSTOLIC BLOOD PRESSURE: 122 MMHG | OXYGEN SATURATION: 100 %

## 2017-08-01 LAB
CULTURE RESULTS: SIGNIFICANT CHANGE UP
ORGANISM # SPEC MICROSCOPIC CNT: SIGNIFICANT CHANGE UP
SPECIMEN SOURCE: SIGNIFICANT CHANGE UP

## 2017-08-01 RX ORDER — TRAMADOL HYDROCHLORIDE 50 MG/1
0.5 TABLET ORAL
Qty: 0 | Refills: 0 | COMMUNITY
Start: 2017-08-01

## 2017-08-01 RX ORDER — PANTOPRAZOLE SODIUM 20 MG/1
1 TABLET, DELAYED RELEASE ORAL
Qty: 0 | Refills: 0 | COMMUNITY
Start: 2017-08-01

## 2017-08-01 RX ORDER — ACETAMINOPHEN 500 MG
2 TABLET ORAL
Qty: 0 | Refills: 0 | COMMUNITY
Start: 2017-08-01

## 2017-08-01 RX ORDER — ENOXAPARIN SODIUM 100 MG/ML
40 INJECTION SUBCUTANEOUS
Qty: 0 | Refills: 0 | COMMUNITY
Start: 2017-08-01

## 2017-08-01 RX ADMIN — CARVEDILOL PHOSPHATE 3.12 MILLIGRAM(S): 80 CAPSULE, EXTENDED RELEASE ORAL at 05:27

## 2017-08-01 RX ADMIN — MEROPENEM 200 MILLIGRAM(S): 1 INJECTION INTRAVENOUS at 11:35

## 2017-08-01 RX ADMIN — PANTOPRAZOLE SODIUM 40 MILLIGRAM(S): 20 TABLET, DELAYED RELEASE ORAL at 05:27

## 2017-08-01 RX ADMIN — LOSARTAN POTASSIUM 50 MILLIGRAM(S): 100 TABLET, FILM COATED ORAL at 05:27

## 2017-08-01 RX ADMIN — ENOXAPARIN SODIUM 40 MILLIGRAM(S): 100 INJECTION SUBCUTANEOUS at 11:35

## 2017-08-01 RX ADMIN — MEROPENEM 200 MILLIGRAM(S): 1 INJECTION INTRAVENOUS at 05:27

## 2017-08-01 NOTE — ADVANCED PRACTICE NURSE CONSULT - ASSESSMENT
Pt is 76 y/o female, alert and oriented, OOB to bathroom. Pt s/p new ileostomy. Pt is ready to be discharged to nursing home today. HILTON Trivedi reported that ileostomy care teaching provided but pt needs teaching reinforcement. Pt said that she had never taking care her ileostomy yet, instructed pt to participate in the ileostomy care in the nursing home. Written and verbal information for ileostomy provided. [employer] ileostomy education booklet provided, [employer] what is ileostomy education video also showed to pt. Written information for ileostomy (pouching procedure, routine ostomy care information, instruction on how to using the ostomy supplies) provided to pt. Pt reported that she lived with her daughter, however, daughter was not able to participate in her care since the daughter works during the day. Emotional support provided, questions answered to pt's satisfaction. Pouching was changed by MD this morning as per RN, pouching intact, no leaking issues, pouching with liquid output. Pt was instructed to increase fluid intact to prevent dehydration. Pt verbalized the understanding of the information provided.

## 2017-08-01 NOTE — DISCHARGE NOTE ADULT - CARE PROVIDERS DIRECT ADDRESSES
[email or web address]

## 2017-08-01 NOTE — DISCHARGE NOTE ADULT - ADDITIONAL INSTRUCTIONS
BATHING: Please do not submerge wound underwater. You may shower and/or sponge bathe.   ACTIVITY: No heavy lifting or straining. Otherwise, you may return to your usual level of physical activity. If you are taking narcotic pain medication (such as Percocet) DO NOT drive a car, operate machinery or make important decisions.  DIET: Return to your usual diet.  NOTIFY YOUR SURGEON IF: You have any bleeding that does not stop, any pus draining from your wound(s), any fever (over 100.4 F) or chills, persistent nausea/vomiting, persistent diarrhea, or if your pain is not controlled on your discharge pain medications.  FOLLOW-UP: Please follow up with your primary care physician and Acute Care Surgery clinic [phone] in 10-14 days regarding your hospitalization. Call for appointment upon discharge.

## 2017-08-01 NOTE — ADVANCED PRACTICE NURSE CONSULT - RECOMMEDATIONS
Encouraged pt to participate in the ileostomy care and reinforce the teaching during the nursing home. Pt verbalized the understanding of the information provided.

## 2017-08-01 NOTE — DISCHARGE NOTE ADULT - HOSPITAL COURSE
HPI on admission: 78 y/o F w/ PMH pacemaker (recently interrogated by Cardiologist from [facility] on 07/20) well known to ACS service comes to the ED w/ c/o of PO intolerance since yesterday. She was recently discharged for acute diverticulitis with microperforation of the sigmoid colon on PO Augmentin. She reports that she has been vomiting everything she has been eating. She denies abdominal pain, last bowel movement was this morning as well as flatus. She denies fevers, chills, chest pain, palpitations, diarrhea, dysuria, dizziness or SOB.     Patient was admitted to the acute care surgery team, and went to the OR with Dr. Lopez on 7/27 for a liver biopsy, sigmoidectomy, exploratory laparotomy, drainage of pelvic abscess, mobilization of splenic flexure, small bowel resection and ileostomy. Patient tolerated the procedure well with no complications.     Patient was transferred to [address] [place] [address] to recover. Her ostomy started functioning with stool and flatus in the bag, and her pain control was adequate and subsequently changed from IV to PO pain medication. Patient's diet was advanced as tolerated, and patient currently tolerating a regular diet. Patient is voiding independently, and ambulating with assistance. At the time of discharge, patient is hemodynamically normal, and has no leukocytosis. Ostomy teaching will be done at [facility].    Patient is stable to be discharged to [facility] at this time. Patient was given verbal and written discharge instructions, and expressed verbal understanding of her instructions.

## 2017-08-01 NOTE — DISCHARGE NOTE ADULT - MEDICATION SUMMARY - MEDICATIONS TO TAKE
I will START or STAY ON the medications listed below when I get home from the hospital:    acetaminophen 325 mg oral tablet  -- 2 tab(s) by mouth every 6 hours, As needed, Mild Pain (1 - 3)  -- Indication: For pain    traMADol 50 mg oral tablet  -- 0.5 tab(s) by mouth every 4 hours, As needed, Moderate Pain (4 - 6)  -- Indication: For pain    irbesartan 150 mg oral tablet  -- 1 tab(s) by mouth once a day  -- Indication: For HTN      enoxaparin  -- 40 milligram(s) by mouth once a day until patient is more mobile  -- Indication: For Dvt Prophylaxis    Coreg 3.125 mg oral tablet  -- 1 tab(s) by mouth 2 times a day  -- Indication: For HTN      docusate sodium 100 mg oral capsule  -- 1 cap(s) by mouth 3 times a day  -- Indication: For constipation    senna oral tablet  -- 2 tab(s) by mouth once a day (at bedtime)  -- Indication: For constipation    amoxicillin-clavulanate 875 mg-125 mg oral tablet  -- 1 tab(s) by mouth 2 times a day  -- Indication: For antibiotic    pantoprazole 40 mg oral delayed release tablet  -- 1 tab(s) by mouth once a day (before a meal)  -- Indication: For GERD I will START or STAY ON the medications listed below when I get home from the hospital:    acetaminophen 325 mg oral tablet  -- 2 tab(s) by mouth every 6 hours, As needed, Mild Pain (1 - 3)  -- Indication: For Pain    traMADol 50 mg oral tablet  -- 0.5 tab(s) by mouth every 4 hours, As needed, Moderate Pain (4 - 6)  -- Indication: For Pain    irbesartan 150 mg oral tablet  -- 1 tab(s) by mouth once a day  -- Indication: For HTN (hypertension)    enoxaparin  -- 40 milligram(s) by mouth once a day until patient is more mobile  -- Indication: For DVT ppx    Coreg 3.125 mg oral tablet  -- 1 tab(s) by mouth 2 times a day  -- Indication: For HTN (hypertension)    docusate sodium 100 mg oral capsule  -- 1 cap(s) by mouth 3 times a day  -- Indication: For Constipation    senna oral tablet  -- 2 tab(s) by mouth once a day (at bedtime)  -- Indication: For Constipation    pantoprazole 40 mg oral delayed release tablet  -- 1 tab(s) by mouth once a day (before a meal)  -- Indication: For GERD

## 2017-08-01 NOTE — DISCHARGE NOTE ADULT - ABILITY TO HEAR (WITH HEARING AID OR HEARING APPLIANCE IF NORMALLY USED):
uses hearing aids/Mildly to Moderately Impaired: difficulty hearing in some environments or speaker may need to increase volume or speak distinctly

## 2017-08-01 NOTE — DISCHARGE NOTE ADULT - MEDICATION SUMMARY - MEDICATIONS TO STOP TAKING
I will STOP taking the medications listed below when I get home from the hospital:  None I will STOP taking the medications listed below when I get home from the hospital:    amoxicillin-clavulanate 875 mg-125 mg oral tablet  -- 1 tab(s) by mouth 2 times a day

## 2017-08-01 NOTE — DISCHARGE NOTE ADULT - PLAN OF CARE
to be pain free and return to regular level of functioning BATHING: Please do not submerge wound underwater. You may shower and/or sponge bathe.   ACTIVITY: No heavy lifting or straining. Otherwise, you may return to your usual level of physical activity. If you are taking narcotic pain medication (such as Percocet) DO NOT drive a car, operate machinery or make important decisions.  DIET: Return to your usual diet.  NOTIFY YOUR SURGEON IF: You have any bleeding that does not stop, any pus draining from your wound(s), any fever (over 100.4 F) or chills, persistent nausea/vomiting, persistent diarrhea, or if your pain is not controlled on your discharge pain medications.  FOLLOW-UP: Please follow up with your primary care physician and Acute Care Surgery clinic [phone] in 10-14 days regarding your hospitalization. Call for appointment upon discharge. Alleviation of pain and symptoms Follow up: Please call and make an appointment with the Acute Care Surgery Clinic 10-14 days after discharge. Also, please call and make an appointment with your primary care physician as per your usual schedule.   Activity: May return to normal activities as tolerated, however refrain from heavy lifting > 10-15 lbs  Diet: May continue regular diet with ensure supplements  Medications: Please take all home medications as prescribed by your primary care doctor. Pain medication has been prescribed for you. Please, take it as it has been prescribed, do not drive or operate heavy machinery while taking narcotics.  You are encouraged to take over-the-counter tylenol and/or ibuprofen for pain relief when you feel your pain no longer warrants the use of narcotic pain medications.  Wound Care: Please, keep wound site clean and dry. You may shower, but do not bathe. Please continue to perform daily wet-to-dry dressing changes to your midline wound.  Ostomy Care: Providers at your sub-acute rehab facility will assist you in ostomy care and teach you how to perform it independently so that when you leave your rehab facility you are competent to do it independently.   Patient is advised to RETURN TO THE EMERGENCY DEPARTMENT for any of the following - worsening pain, fever/chills, nausea/vomiting, altered mental status, chest pain, shortness of breath, or any other new / worsening symptom.

## 2017-08-01 NOTE — DISCHARGE NOTE ADULT - CARE PLAN
Principal Discharge DX:	Diverticulitis of intestine with perforation without bleeding, unspecified part of intestinal tract  Goal:	to be pain free and return to regular level of functioning  Instructions for follow-up, activity and diet:	BATHING: Please do not submerge wound underwater. You may shower and/or sponge bathe.   ACTIVITY: No heavy lifting or straining. Otherwise, you may return to your usual level of physical activity. If you are taking narcotic pain medication (such as Percocet) DO NOT drive a car, operate machinery or make important decisions.  DIET: Return to your usual diet.  NOTIFY YOUR SURGEON IF: You have any bleeding that does not stop, any pus draining from your wound(s), any fever (over 100.4 F) or chills, persistent nausea/vomiting, persistent diarrhea, or if your pain is not controlled on your discharge pain medications.  FOLLOW-UP: Please follow up with your primary care physician and Acute Care Surgery clinic [phone] in 10-14 days regarding your hospitalization. Call for appointment upon discharge. Principal Discharge DX:	Diverticulitis of intestine with perforation without bleeding, unspecified part of intestinal tract  Goal:	to be pain free and return to regular level of functioning  Instructions for follow-up, activity and diet:	BATHING: Please do not submerge wound underwater. You may shower and/or sponge bathe.   ACTIVITY: No heavy lifting or straining. Otherwise, you may return to your usual level of physical activity. If you are taking narcotic pain medication (such as Percocet) DO NOT drive a car, operate machinery or make important decisions.  DIET: Return to your usual diet.  NOTIFY YOUR SURGEON IF: You have any bleeding that does not stop, any pus draining from your wound(s), any fever (over 100.4 F) or chills, persistent nausea/vomiting, persistent diarrhea, or if your pain is not controlled on your discharge pain medications.  FOLLOW-UP: Please follow up with your primary care physician and Acute Care Surgery clinic [phone] in 10-14 days regarding your hospitalization. Call for appointment upon discharge. Principal Discharge DX:	Diverticulitis of intestine with perforation without bleeding, unspecified part of intestinal tract  Goal:	Alleviation of pain and symptoms  Instructions for follow-up, activity and diet:	Follow up: Please call and make an appointment with the Acute Care Surgery Clinic 10-14 days after discharge. Also, please call and make an appointment with your primary care physician as per your usual schedule.   Activity: May return to normal activities as tolerated, however refrain from heavy lifting > 10-15 lbs  Diet: May continue regular diet with ensure supplements  Medications: Please take all home medications as prescribed by your primary care doctor. Pain medication has been prescribed for you. Please, take it as it has been prescribed, do not drive or operate heavy machinery while taking narcotics.  You are encouraged to take over-the-counter tylenol and/or ibuprofen for pain relief when you feel your pain no longer warrants the use of narcotic pain medications.  Wound Care: Please, keep wound site clean and dry. You may shower, but do not bathe. Please continue to perform daily wet-to-dry dressing changes to your midline wound.  Ostomy Care: Providers at your sub-acute rehab facility will assist you in ostomy care and teach you how to perform it independently so that when you leave your rehab facility you are competent to do it independently.   Patient is advised to RETURN TO THE EMERGENCY DEPARTMENT for any of the following - worsening pain, fever/chills, nausea/vomiting, altered mental status, chest pain, shortness of breath, or any other new / worsening symptom.

## 2017-08-01 NOTE — DISCHARGE NOTE ADULT - CARE PROVIDER_API CALL
Laurence Lopez), Surgery; Surgical Critical Care  [address] [place] [id number]  Phone: [phone]  Fax: [phone]

## 2017-08-01 NOTE — DISCHARGE NOTE ADULT - NS AS ACTIVITY OBS
Showering allowed/Walking-Outdoors allowed/Do not make important decisions/Do not drive or operate machinery/No Heavy lifting/straining/Walking-Indoors allowed

## 2017-08-01 NOTE — DISCHARGE NOTE ADULT - PATIENT PORTAL LINK FT
“You can access the [employer] Patient Portal, offered by [facility], by registering with the following website: [email or web address]”

## 2017-08-03 LAB — SURGICAL PATHOLOGY FINAL REPORT - CH: SIGNIFICANT CHANGE UP

## 2017-08-10 ENCOUNTER — APPOINTMENT (OUTPATIENT)
Dept: TRAUMA SURGERY | Facility: CLINIC | Age: 77
End: 2017-08-10
Payer: MEDICARE

## 2017-08-10 VITALS
DIASTOLIC BLOOD PRESSURE: 66 MMHG | OXYGEN SATURATION: 98 % | HEART RATE: 82 BPM | SYSTOLIC BLOOD PRESSURE: 129 MMHG | TEMPERATURE: 98.2 F | RESPIRATION RATE: 16 BRPM

## 2017-08-10 DIAGNOSIS — Z78.9 OTHER SPECIFIED HEALTH STATUS: ICD-10-CM

## 2017-08-10 DIAGNOSIS — Z85.3 PERSONAL HISTORY OF MALIGNANT NEOPLASM OF BREAST: ICD-10-CM

## 2017-08-10 DIAGNOSIS — Z95.0 PRESENCE OF CARDIAC PACEMAKER: ICD-10-CM

## 2017-08-10 PROCEDURE — 99024 POSTOP FOLLOW-UP VISIT: CPT

## 2017-08-22 ENCOUNTER — APPOINTMENT (OUTPATIENT)
Dept: TRAUMA SURGERY | Facility: CLINIC | Age: 77
End: 2017-08-22
Payer: MEDICARE

## 2017-08-22 VITALS
TEMPERATURE: 98.1 F | OXYGEN SATURATION: 99 % | RESPIRATION RATE: 15 BRPM | SYSTOLIC BLOOD PRESSURE: 116 MMHG | HEART RATE: 90 BPM | DIASTOLIC BLOOD PRESSURE: 69 MMHG | HEIGHT: 62 IN

## 2017-08-22 PROCEDURE — 99024 POSTOP FOLLOW-UP VISIT: CPT

## 2017-09-12 ENCOUNTER — APPOINTMENT (OUTPATIENT)
Dept: TRAUMA SURGERY | Facility: CLINIC | Age: 77
End: 2017-09-12
Payer: MEDICARE

## 2017-09-12 VITALS
HEIGHT: 62 IN | TEMPERATURE: 97.9 F | SYSTOLIC BLOOD PRESSURE: 130 MMHG | HEART RATE: 80 BPM | DIASTOLIC BLOOD PRESSURE: 72 MMHG | BODY MASS INDEX: 24.66 KG/M2 | OXYGEN SATURATION: 99 % | RESPIRATION RATE: 15 BRPM | WEIGHT: 134 LBS

## 2017-09-12 PROCEDURE — 99024 POSTOP FOLLOW-UP VISIT: CPT

## 2017-09-28 ENCOUNTER — APPOINTMENT (OUTPATIENT)
Dept: TRAUMA SURGERY | Facility: CLINIC | Age: 77
End: 2017-09-28
Payer: MEDICARE

## 2017-09-28 VITALS
OXYGEN SATURATION: 98 % | BODY MASS INDEX: 24.84 KG/M2 | TEMPERATURE: 98.2 F | SYSTOLIC BLOOD PRESSURE: 109 MMHG | WEIGHT: 135 LBS | RESPIRATION RATE: 14 BRPM | HEART RATE: 72 BPM | HEIGHT: 62 IN | DIASTOLIC BLOOD PRESSURE: 57 MMHG

## 2017-09-28 PROCEDURE — 99024 POSTOP FOLLOW-UP VISIT: CPT

## 2017-10-11 PROCEDURE — 99285 EMERGENCY DEPT VISIT HI MDM: CPT | Mod: 25

## 2017-10-11 PROCEDURE — 74176 CT ABD & PELVIS W/O CONTRAST: CPT

## 2017-10-11 PROCEDURE — 80048 BASIC METABOLIC PNL TOTAL CA: CPT

## 2017-10-11 PROCEDURE — 85027 COMPLETE CBC AUTOMATED: CPT

## 2017-10-11 PROCEDURE — 96374 THER/PROPH/DIAG INJ IV PUSH: CPT

## 2017-10-11 PROCEDURE — 86850 RBC ANTIBODY SCREEN: CPT

## 2017-10-11 PROCEDURE — 87186 SC STD MICRODIL/AGAR DIL: CPT

## 2017-10-11 PROCEDURE — 74020: CPT

## 2017-10-11 PROCEDURE — 85730 THROMBOPLASTIN TIME PARTIAL: CPT

## 2017-10-11 PROCEDURE — 82150 ASSAY OF AMYLASE: CPT

## 2017-10-11 PROCEDURE — 36415 COLL VENOUS BLD VENIPUNCTURE: CPT

## 2017-10-11 PROCEDURE — 96375 TX/PRO/DX INJ NEW DRUG ADDON: CPT

## 2017-10-11 PROCEDURE — 86901 BLOOD TYPING SEROLOGIC RH(D): CPT

## 2017-10-11 PROCEDURE — 87086 URINE CULTURE/COLONY COUNT: CPT

## 2017-10-11 PROCEDURE — 81001 URINALYSIS AUTO W/SCOPE: CPT

## 2017-10-11 PROCEDURE — 83735 ASSAY OF MAGNESIUM: CPT

## 2017-10-11 PROCEDURE — 93005 ELECTROCARDIOGRAM TRACING: CPT

## 2017-10-11 PROCEDURE — 84100 ASSAY OF PHOSPHORUS: CPT

## 2017-10-11 PROCEDURE — 80053 COMPREHEN METABOLIC PANEL: CPT

## 2017-10-11 PROCEDURE — 83605 ASSAY OF LACTIC ACID: CPT

## 2017-10-11 PROCEDURE — 86900 BLOOD TYPING SEROLOGIC ABO: CPT

## 2017-10-11 PROCEDURE — 85610 PROTHROMBIN TIME: CPT

## 2017-10-11 PROCEDURE — 83690 ASSAY OF LIPASE: CPT

## 2017-10-12 ENCOUNTER — APPOINTMENT (OUTPATIENT)
Dept: TRAUMA SURGERY | Facility: CLINIC | Age: 77
End: 2017-10-12
Payer: MEDICARE

## 2017-10-12 VITALS
BODY MASS INDEX: 24.11 KG/M2 | RESPIRATION RATE: 14 BRPM | WEIGHT: 131 LBS | DIASTOLIC BLOOD PRESSURE: 72 MMHG | SYSTOLIC BLOOD PRESSURE: 130 MMHG | TEMPERATURE: 97.9 F | HEART RATE: 80 BPM | HEIGHT: 62 IN

## 2017-10-12 PROCEDURE — 99024 POSTOP FOLLOW-UP VISIT: CPT

## 2017-10-19 PROCEDURE — 87186 SC STD MICRODIL/AGAR DIL: CPT

## 2017-10-19 PROCEDURE — 86850 RBC ANTIBODY SCREEN: CPT

## 2017-10-19 PROCEDURE — 88307 TISSUE EXAM BY PATHOLOGIST: CPT

## 2017-10-19 PROCEDURE — 84100 ASSAY OF PHOSPHORUS: CPT

## 2017-10-19 PROCEDURE — 81001 URINALYSIS AUTO W/SCOPE: CPT

## 2017-10-19 PROCEDURE — 99285 EMERGENCY DEPT VISIT HI MDM: CPT | Mod: 25

## 2017-10-19 PROCEDURE — 83735 ASSAY OF MAGNESIUM: CPT

## 2017-10-19 PROCEDURE — 86901 BLOOD TYPING SEROLOGIC RH(D): CPT

## 2017-10-19 PROCEDURE — 87086 URINE CULTURE/COLONY COUNT: CPT

## 2017-10-19 PROCEDURE — 87070 CULTURE OTHR SPECIMN AEROBIC: CPT

## 2017-10-19 PROCEDURE — 93970 EXTREMITY STUDY: CPT

## 2017-10-19 PROCEDURE — 96374 THER/PROPH/DIAG INJ IV PUSH: CPT

## 2017-10-19 PROCEDURE — 71045 X-RAY EXAM CHEST 1 VIEW: CPT

## 2017-10-19 PROCEDURE — 85027 COMPLETE CBC AUTOMATED: CPT

## 2017-10-19 PROCEDURE — 86900 BLOOD TYPING SEROLOGIC ABO: CPT

## 2017-10-19 PROCEDURE — 80048 BASIC METABOLIC PNL TOTAL CA: CPT

## 2017-10-19 PROCEDURE — 88305 TISSUE EXAM BY PATHOLOGIST: CPT

## 2017-10-19 PROCEDURE — 99284 EMERGENCY DEPT VISIT MOD MDM: CPT | Mod: 25

## 2017-10-19 PROCEDURE — 85610 PROTHROMBIN TIME: CPT

## 2017-10-19 PROCEDURE — 93005 ELECTROCARDIOGRAM TRACING: CPT

## 2017-10-19 PROCEDURE — 83690 ASSAY OF LIPASE: CPT

## 2017-10-19 PROCEDURE — 83880 ASSAY OF NATRIURETIC PEPTIDE: CPT

## 2017-10-19 PROCEDURE — 88313 SPECIAL STAINS GROUP 2: CPT

## 2017-10-19 PROCEDURE — 71046 X-RAY EXAM CHEST 2 VIEWS: CPT

## 2017-10-19 PROCEDURE — 74176 CT ABD & PELVIS W/O CONTRAST: CPT

## 2017-10-19 PROCEDURE — 85730 THROMBOPLASTIN TIME PARTIAL: CPT

## 2017-10-19 PROCEDURE — 36415 COLL VENOUS BLD VENIPUNCTURE: CPT

## 2017-10-19 PROCEDURE — 80053 COMPREHEN METABOLIC PANEL: CPT

## 2017-10-19 PROCEDURE — 93306 TTE W/DOPPLER COMPLETE: CPT

## 2017-10-19 PROCEDURE — 87075 CULTR BACTERIA EXCEPT BLOOD: CPT

## 2017-10-19 PROCEDURE — 97163 PT EVAL HIGH COMPLEX 45 MIN: CPT

## 2017-10-19 PROCEDURE — 74020: CPT

## 2017-10-19 PROCEDURE — 96375 TX/PRO/DX INJ NEW DRUG ADDON: CPT

## 2017-11-02 ENCOUNTER — APPOINTMENT (OUTPATIENT)
Dept: TRAUMA SURGERY | Facility: CLINIC | Age: 77
End: 2017-11-02
Payer: MEDICARE

## 2017-11-02 VITALS
HEIGHT: 62 IN | RESPIRATION RATE: 14 BRPM | TEMPERATURE: 97.8 F | SYSTOLIC BLOOD PRESSURE: 121 MMHG | WEIGHT: 128 LBS | HEART RATE: 92 BPM | DIASTOLIC BLOOD PRESSURE: 68 MMHG | BODY MASS INDEX: 23.55 KG/M2

## 2017-11-02 PROCEDURE — 99213 OFFICE O/P EST LOW 20 MIN: CPT

## 2017-12-14 ENCOUNTER — APPOINTMENT (OUTPATIENT)
Dept: TRAUMA SURGERY | Facility: CLINIC | Age: 77
End: 2017-12-14
Payer: MEDICARE

## 2017-12-14 VITALS
HEART RATE: 84 BPM | OXYGEN SATURATION: 96 % | DIASTOLIC BLOOD PRESSURE: 72 MMHG | BODY MASS INDEX: 23.92 KG/M2 | HEIGHT: 62 IN | SYSTOLIC BLOOD PRESSURE: 117 MMHG | RESPIRATION RATE: 14 BRPM | TEMPERATURE: 98 F | WEIGHT: 130 LBS

## 2017-12-14 PROCEDURE — 99213 OFFICE O/P EST LOW 20 MIN: CPT

## 2017-12-14 RX ORDER — SENNOSIDES 8.6 MG TABLETS 8.6 MG/1
8.6 TABLET ORAL
Refills: 0 | Status: ACTIVE | COMMUNITY

## 2017-12-14 RX ORDER — BISACODYL 10 MG/1
10 SUPPOSITORY RECTAL
Refills: 0 | Status: ACTIVE | COMMUNITY

## 2017-12-14 RX ORDER — TRAMADOL HYDROCHLORIDE 50 MG/1
50 TABLET, COATED ORAL
Refills: 0 | Status: ACTIVE | COMMUNITY

## 2017-12-14 RX ORDER — ACETAMINOPHEN 325 MG/1
325 TABLET ORAL
Refills: 0 | Status: ACTIVE | COMMUNITY

## 2017-12-14 RX ORDER — NYSTATIN 100000 [USP'U]/G
100000 CREAM TOPICAL
Refills: 0 | Status: ACTIVE | COMMUNITY

## 2017-12-14 RX ORDER — IRBESARTAN 75 MG/1
75 TABLET, FILM COATED ORAL
Refills: 0 | Status: ACTIVE | COMMUNITY

## 2017-12-14 RX ORDER — CARVEDILOL 3.12 MG/1
TABLET, FILM COATED ORAL
Refills: 0 | Status: ACTIVE | COMMUNITY

## 2017-12-14 RX ORDER — PANTOPRAZOLE 40 MG/1
40 TABLET, DELAYED RELEASE ORAL
Refills: 0 | Status: ACTIVE | COMMUNITY

## 2017-12-14 RX ORDER — MAGNESIUM HYDROXIDE 400 MG/5ML
400 SUSPENSION ORAL
Refills: 0 | Status: ACTIVE | COMMUNITY

## 2018-01-11 ENCOUNTER — APPOINTMENT (OUTPATIENT)
Dept: TRAUMA SURGERY | Facility: CLINIC | Age: 78
End: 2018-01-11
Payer: MEDICARE

## 2018-01-11 VITALS
WEIGHT: 128 LBS | DIASTOLIC BLOOD PRESSURE: 62 MMHG | TEMPERATURE: 98 F | RESPIRATION RATE: 14 BRPM | HEIGHT: 62 IN | BODY MASS INDEX: 23.55 KG/M2 | SYSTOLIC BLOOD PRESSURE: 118 MMHG | OXYGEN SATURATION: 98 % | HEART RATE: 73 BPM

## 2018-01-11 PROCEDURE — 99213 OFFICE O/P EST LOW 20 MIN: CPT

## 2018-01-18 ENCOUNTER — OUTPATIENT (OUTPATIENT)
Dept: OUTPATIENT SERVICES | Facility: HOSPITAL | Age: 78
LOS: 1 days | End: 2018-01-18
Payer: MEDICARE

## 2018-01-18 ENCOUNTER — APPOINTMENT (OUTPATIENT)
Dept: CT IMAGING | Facility: CLINIC | Age: 78
End: 2018-01-18
Payer: MEDICARE

## 2018-01-18 DIAGNOSIS — K57.32 DIVERTICULITIS OF LARGE INTESTINE WITHOUT PERFORATION OR ABSCESS WITHOUT BLEEDING: ICD-10-CM

## 2018-01-18 DIAGNOSIS — Z87.898 PERSONAL HISTORY OF OTHER SPECIFIED CONDITIONS: Chronic | ICD-10-CM

## 2018-01-18 PROCEDURE — 74177 CT ABD & PELVIS W/CONTRAST: CPT | Mod: 26

## 2018-01-18 PROCEDURE — 74177 CT ABD & PELVIS W/CONTRAST: CPT

## 2018-01-18 PROCEDURE — 82565 ASSAY OF CREATININE: CPT

## 2018-02-01 ENCOUNTER — APPOINTMENT (OUTPATIENT)
Dept: TRAUMA SURGERY | Facility: CLINIC | Age: 78
End: 2018-02-01
Payer: MEDICARE

## 2018-02-01 VITALS
TEMPERATURE: 97.5 F | BODY MASS INDEX: 23.74 KG/M2 | DIASTOLIC BLOOD PRESSURE: 67 MMHG | SYSTOLIC BLOOD PRESSURE: 121 MMHG | HEIGHT: 62 IN | RESPIRATION RATE: 14 BRPM | WEIGHT: 129 LBS | HEART RATE: 79 BPM

## 2018-02-01 PROCEDURE — 99213 OFFICE O/P EST LOW 20 MIN: CPT

## 2018-02-12 ENCOUNTER — OUTPATIENT (OUTPATIENT)
Dept: OUTPATIENT SERVICES | Facility: HOSPITAL | Age: 78
LOS: 1 days | End: 2018-02-12
Payer: MEDICARE

## 2018-02-12 VITALS
SYSTOLIC BLOOD PRESSURE: 132 MMHG | TEMPERATURE: 98 F | HEART RATE: 66 BPM | DIASTOLIC BLOOD PRESSURE: 73 MMHG | RESPIRATION RATE: 16 BRPM

## 2018-02-12 DIAGNOSIS — I10 ESSENTIAL (PRIMARY) HYPERTENSION: ICD-10-CM

## 2018-02-12 DIAGNOSIS — Z01.818 ENCOUNTER FOR OTHER PREPROCEDURAL EXAMINATION: ICD-10-CM

## 2018-02-12 DIAGNOSIS — I38 ENDOCARDITIS, VALVE UNSPECIFIED: ICD-10-CM

## 2018-02-12 DIAGNOSIS — Z90.49 ACQUIRED ABSENCE OF OTHER SPECIFIED PARTS OF DIGESTIVE TRACT: Chronic | ICD-10-CM

## 2018-02-12 DIAGNOSIS — I42.9 CARDIOMYOPATHY, UNSPECIFIED: ICD-10-CM

## 2018-02-12 DIAGNOSIS — Z93.2 ILEOSTOMY STATUS: Chronic | ICD-10-CM

## 2018-02-12 DIAGNOSIS — Z87.898 PERSONAL HISTORY OF OTHER SPECIFIED CONDITIONS: Chronic | ICD-10-CM

## 2018-02-12 DIAGNOSIS — Z98.890 OTHER SPECIFIED POSTPROCEDURAL STATES: Chronic | ICD-10-CM

## 2018-02-12 DIAGNOSIS — Z95.810 PRESENCE OF AUTOMATIC (IMPLANTABLE) CARDIAC DEFIBRILLATOR: Chronic | ICD-10-CM

## 2018-02-12 DIAGNOSIS — K57.32 DIVERTICULITIS OF LARGE INTESTINE WITHOUT PERFORATION OR ABSCESS WITHOUT BLEEDING: ICD-10-CM

## 2018-02-12 LAB
ALBUMIN SERPL ELPH-MCNC: 4.1 G/DL — SIGNIFICANT CHANGE UP (ref 3.3–5.2)
ALP SERPL-CCNC: 86 U/L — SIGNIFICANT CHANGE UP (ref 40–120)
ALT FLD-CCNC: 18 U/L — SIGNIFICANT CHANGE UP
ANION GAP SERPL CALC-SCNC: 10 MMOL/L — SIGNIFICANT CHANGE UP (ref 5–17)
APTT BLD: 28.3 SEC — SIGNIFICANT CHANGE UP (ref 27.5–37.4)
AST SERPL-CCNC: 29 U/L — SIGNIFICANT CHANGE UP
BASOPHILS # BLD AUTO: 0 K/UL — SIGNIFICANT CHANGE UP (ref 0–0.2)
BASOPHILS NFR BLD AUTO: 0.3 % — SIGNIFICANT CHANGE UP (ref 0–2)
BILIRUB SERPL-MCNC: 0.5 MG/DL — SIGNIFICANT CHANGE UP (ref 0.4–2)
BLD GP AB SCN SERPL QL: SIGNIFICANT CHANGE UP
BUN SERPL-MCNC: 15 MG/DL — SIGNIFICANT CHANGE UP (ref 8–20)
CALCIUM SERPL-MCNC: 10 MG/DL — SIGNIFICANT CHANGE UP (ref 8.6–10.2)
CHLORIDE SERPL-SCNC: 103 MMOL/L — SIGNIFICANT CHANGE UP (ref 98–107)
CO2 SERPL-SCNC: 25 MMOL/L — SIGNIFICANT CHANGE UP (ref 22–29)
CREAT SERPL-MCNC: 0.83 MG/DL — SIGNIFICANT CHANGE UP (ref 0.5–1.3)
EOSINOPHIL # BLD AUTO: 0.1 K/UL — SIGNIFICANT CHANGE UP (ref 0–0.5)
EOSINOPHIL NFR BLD AUTO: 2 % — SIGNIFICANT CHANGE UP (ref 0–6)
GLUCOSE SERPL-MCNC: 88 MG/DL — SIGNIFICANT CHANGE UP (ref 70–115)
HBA1C BLD-MCNC: 5 % — SIGNIFICANT CHANGE UP (ref 4–5.6)
HCT VFR BLD CALC: 34.8 % — LOW (ref 37–47)
HGB BLD-MCNC: 11.2 G/DL — LOW (ref 12–16)
INR BLD: 0.98 RATIO — SIGNIFICANT CHANGE UP (ref 0.88–1.16)
LYMPHOCYTES # BLD AUTO: 1.7 K/UL — SIGNIFICANT CHANGE UP (ref 1–4.8)
LYMPHOCYTES # BLD AUTO: 28.8 % — SIGNIFICANT CHANGE UP (ref 20–55)
MCHC RBC-ENTMCNC: 29.6 PG — SIGNIFICANT CHANGE UP (ref 27–31)
MCHC RBC-ENTMCNC: 32.2 G/DL — SIGNIFICANT CHANGE UP (ref 32–36)
MCV RBC AUTO: 92.1 FL — SIGNIFICANT CHANGE UP (ref 81–99)
MONOCYTES # BLD AUTO: 0.4 K/UL — SIGNIFICANT CHANGE UP (ref 0–0.8)
MONOCYTES NFR BLD AUTO: 6.8 % — SIGNIFICANT CHANGE UP (ref 3–10)
NEUTROPHILS # BLD AUTO: 3.8 K/UL — SIGNIFICANT CHANGE UP (ref 1.8–8)
NEUTROPHILS NFR BLD AUTO: 61.9 % — SIGNIFICANT CHANGE UP (ref 37–73)
PLATELET # BLD AUTO: 261 K/UL — SIGNIFICANT CHANGE UP (ref 150–400)
POTASSIUM SERPL-MCNC: 4.6 MMOL/L — SIGNIFICANT CHANGE UP (ref 3.5–5.3)
POTASSIUM SERPL-SCNC: 4.6 MMOL/L — SIGNIFICANT CHANGE UP (ref 3.5–5.3)
PROT SERPL-MCNC: 6.9 G/DL — SIGNIFICANT CHANGE UP (ref 6.6–8.7)
PROTHROM AB SERPL-ACNC: 10.8 SEC — SIGNIFICANT CHANGE UP (ref 9.8–12.7)
RBC # BLD: 3.78 M/UL — LOW (ref 4.4–5.2)
RBC # FLD: 13.7 % — SIGNIFICANT CHANGE UP (ref 11–15.6)
SODIUM SERPL-SCNC: 138 MMOL/L — SIGNIFICANT CHANGE UP (ref 135–145)
TYPE + AB SCN PNL BLD: SIGNIFICANT CHANGE UP
WBC # BLD: 6 K/UL — SIGNIFICANT CHANGE UP (ref 4.8–10.8)
WBC # FLD AUTO: 6 K/UL — SIGNIFICANT CHANGE UP (ref 4.8–10.8)

## 2018-02-12 PROCEDURE — 86850 RBC ANTIBODY SCREEN: CPT

## 2018-02-12 PROCEDURE — 85730 THROMBOPLASTIN TIME PARTIAL: CPT

## 2018-02-12 PROCEDURE — 86901 BLOOD TYPING SEROLOGIC RH(D): CPT

## 2018-02-12 PROCEDURE — 83036 HEMOGLOBIN GLYCOSYLATED A1C: CPT

## 2018-02-12 PROCEDURE — 85610 PROTHROMBIN TIME: CPT

## 2018-02-12 PROCEDURE — 36415 COLL VENOUS BLD VENIPUNCTURE: CPT

## 2018-02-12 PROCEDURE — G0463: CPT

## 2018-02-12 PROCEDURE — 93005 ELECTROCARDIOGRAM TRACING: CPT

## 2018-02-12 PROCEDURE — 80053 COMPREHEN METABOLIC PANEL: CPT

## 2018-02-12 PROCEDURE — 93010 ELECTROCARDIOGRAM REPORT: CPT

## 2018-02-12 PROCEDURE — 86900 BLOOD TYPING SEROLOGIC ABO: CPT

## 2018-02-12 PROCEDURE — 85027 COMPLETE CBC AUTOMATED: CPT

## 2018-02-12 RX ORDER — SODIUM CHLORIDE 9 MG/ML
3 INJECTION INTRAMUSCULAR; INTRAVENOUS; SUBCUTANEOUS EVERY 8 HOURS
Qty: 0 | Refills: 0 | Status: DISCONTINUED | OUTPATIENT
Start: 2018-02-22 | End: 2018-02-22

## 2018-02-12 RX ORDER — CARVEDILOL PHOSPHATE 80 MG/1
1 CAPSULE, EXTENDED RELEASE ORAL
Qty: 0 | Refills: 0 | COMMUNITY

## 2018-02-12 RX ORDER — AZTREONAM 2 G
2000 VIAL (EA) INJECTION ONCE
Qty: 0 | Refills: 0 | Status: COMPLETED | OUTPATIENT
Start: 2018-02-22 | End: 2018-02-22

## 2018-02-12 RX ORDER — IRBESARTAN 75 MG/1
1 TABLET ORAL
Qty: 0 | Refills: 0 | COMMUNITY

## 2018-02-12 NOTE — H&P PST ADULT - NSANTHOSAYNRD_GEN_A_CORE
No. IRAIDA screening performed.  STOP BANG Legend: 0-2 = LOW Risk; 3-4 = INTERMEDIATE Risk; 5-8 = HIGH Risk

## 2018-02-12 NOTE — PATIENT PROFILE ADULT. - LEARNING ASSESSMENT (PATIENT) ADDITIONAL COMMENTS
presurgical , surgical scrub instructions, pain manage,ment education given - verbalized understanding

## 2018-02-12 NOTE — H&P PST ADULT - PSH
AICD (automatic cardioverter/defibrillator) present    H/O bilateral oophorectomy    H/O lymphadenopathy    S/P appendectomy    S/P ileostomy    S/P lumpectomy, right breast

## 2018-02-13 PROBLEM — Z95.0 PRESENCE OF CARDIAC PACEMAKER: Chronic | Status: INACTIVE | Noted: 2017-07-17 | Resolved: 2018-02-12

## 2018-02-22 ENCOUNTER — RESULT REVIEW (OUTPATIENT)
Age: 78
End: 2018-02-22

## 2018-02-22 ENCOUNTER — TRANSCRIPTION ENCOUNTER (OUTPATIENT)
Age: 78
End: 2018-02-22

## 2018-02-22 ENCOUNTER — INPATIENT (INPATIENT)
Facility: HOSPITAL | Age: 78
LOS: 8 days | Discharge: HOME | DRG: 330 | End: 2018-03-03
Attending: SURGERY | Admitting: SURGERY
Payer: MEDICARE

## 2018-02-22 VITALS
TEMPERATURE: 97 F | OXYGEN SATURATION: 99 % | SYSTOLIC BLOOD PRESSURE: 116 MMHG | HEART RATE: 64 BPM | RESPIRATION RATE: 16 BRPM | WEIGHT: 128.97 LBS | HEIGHT: 62 IN | DIASTOLIC BLOOD PRESSURE: 66 MMHG

## 2018-02-22 DIAGNOSIS — Z95.810 PRESENCE OF AUTOMATIC (IMPLANTABLE) CARDIAC DEFIBRILLATOR: Chronic | ICD-10-CM

## 2018-02-22 DIAGNOSIS — Z98.890 OTHER SPECIFIED POSTPROCEDURAL STATES: Chronic | ICD-10-CM

## 2018-02-22 DIAGNOSIS — Z93.2 ILEOSTOMY STATUS: Chronic | ICD-10-CM

## 2018-02-22 DIAGNOSIS — K57.32 DIVERTICULITIS OF LARGE INTESTINE WITHOUT PERFORATION OR ABSCESS WITHOUT BLEEDING: ICD-10-CM

## 2018-02-22 DIAGNOSIS — Z90.49 ACQUIRED ABSENCE OF OTHER SPECIFIED PARTS OF DIGESTIVE TRACT: Chronic | ICD-10-CM

## 2018-02-22 DIAGNOSIS — Z87.898 PERSONAL HISTORY OF OTHER SPECIFIED CONDITIONS: Chronic | ICD-10-CM

## 2018-02-22 PROCEDURE — 88304 TISSUE EXAM BY PATHOLOGIST: CPT | Mod: 26

## 2018-02-22 PROCEDURE — 44625 REPAIR BOWEL OPENING: CPT

## 2018-02-22 RX ORDER — SODIUM CHLORIDE 9 MG/ML
500 INJECTION, SOLUTION INTRAVENOUS ONCE
Qty: 0 | Refills: 0 | Status: COMPLETED | OUTPATIENT
Start: 2018-02-22 | End: 2018-02-22

## 2018-02-22 RX ORDER — SODIUM CHLORIDE 9 MG/ML
1000 INJECTION, SOLUTION INTRAVENOUS
Qty: 0 | Refills: 0 | Status: DISCONTINUED | OUTPATIENT
Start: 2018-02-22 | End: 2018-02-22

## 2018-02-22 RX ORDER — SODIUM CHLORIDE 9 MG/ML
1000 INJECTION, SOLUTION INTRAVENOUS
Qty: 0 | Refills: 0 | Status: DISCONTINUED | OUTPATIENT
Start: 2018-02-22 | End: 2018-02-24

## 2018-02-22 RX ORDER — ASPIRIN/CALCIUM CARB/MAGNESIUM 324 MG
81 TABLET ORAL DAILY
Qty: 0 | Refills: 0 | Status: DISCONTINUED | OUTPATIENT
Start: 2018-02-22 | End: 2018-03-03

## 2018-02-22 RX ORDER — IBUPROFEN 200 MG
800 TABLET ORAL EVERY 8 HOURS
Qty: 0 | Refills: 0 | Status: DISCONTINUED | OUTPATIENT
Start: 2018-02-22 | End: 2018-02-23

## 2018-02-22 RX ORDER — LOSARTAN POTASSIUM 100 MG/1
25 TABLET, FILM COATED ORAL DAILY
Qty: 0 | Refills: 0 | Status: DISCONTINUED | OUTPATIENT
Start: 2018-02-22 | End: 2018-02-28

## 2018-02-22 RX ORDER — CARVEDILOL PHOSPHATE 80 MG/1
6.25 CAPSULE, EXTENDED RELEASE ORAL EVERY 12 HOURS
Qty: 0 | Refills: 0 | Status: DISCONTINUED | OUTPATIENT
Start: 2018-02-22 | End: 2018-03-03

## 2018-02-22 RX ORDER — KETOROLAC TROMETHAMINE 30 MG/ML
15 SYRINGE (ML) INJECTION EVERY 6 HOURS
Qty: 0 | Refills: 0 | Status: DISCONTINUED | OUTPATIENT
Start: 2018-02-22 | End: 2018-02-23

## 2018-02-22 RX ORDER — ONDANSETRON 8 MG/1
4 TABLET, FILM COATED ORAL ONCE
Qty: 0 | Refills: 0 | Status: COMPLETED | OUTPATIENT
Start: 2018-02-22 | End: 2018-02-22

## 2018-02-22 RX ORDER — ONDANSETRON 8 MG/1
4 TABLET, FILM COATED ORAL EVERY 6 HOURS
Qty: 0 | Refills: 0 | Status: DISCONTINUED | OUTPATIENT
Start: 2018-02-22 | End: 2018-03-03

## 2018-02-22 RX ORDER — PANTOPRAZOLE SODIUM 20 MG/1
40 TABLET, DELAYED RELEASE ORAL
Qty: 0 | Refills: 0 | Status: DISCONTINUED | OUTPATIENT
Start: 2018-02-22 | End: 2018-03-03

## 2018-02-22 RX ORDER — ALPRAZOLAM 0.25 MG
0.25 TABLET ORAL EVERY 8 HOURS
Qty: 0 | Refills: 0 | Status: DISCONTINUED | OUTPATIENT
Start: 2018-02-22 | End: 2018-02-22

## 2018-02-22 RX ORDER — ENOXAPARIN SODIUM 100 MG/ML
40 INJECTION SUBCUTANEOUS DAILY
Qty: 0 | Refills: 0 | Status: DISCONTINUED | OUTPATIENT
Start: 2018-02-22 | End: 2018-03-03

## 2018-02-22 RX ORDER — FENTANYL CITRATE 50 UG/ML
25 INJECTION INTRAVENOUS
Qty: 0 | Refills: 0 | Status: DISCONTINUED | OUTPATIENT
Start: 2018-02-22 | End: 2018-02-22

## 2018-02-22 RX ORDER — TRAMADOL HYDROCHLORIDE 50 MG/1
50 TABLET ORAL EVERY 6 HOURS
Qty: 0 | Refills: 0 | Status: DISCONTINUED | OUTPATIENT
Start: 2018-02-22 | End: 2018-02-23

## 2018-02-22 RX ORDER — CARVEDILOL PHOSPHATE 80 MG/1
6.25 CAPSULE, EXTENDED RELEASE ORAL EVERY 12 HOURS
Qty: 0 | Refills: 0 | Status: DISCONTINUED | OUTPATIENT
Start: 2018-02-22 | End: 2018-02-22

## 2018-02-22 RX ADMIN — Medication 100 MILLIGRAM(S): at 08:15

## 2018-02-22 RX ADMIN — SODIUM CHLORIDE 500 MILLILITER(S): 9 INJECTION, SOLUTION INTRAVENOUS at 18:15

## 2018-02-22 RX ADMIN — Medication 15 MILLIGRAM(S): at 12:52

## 2018-02-22 RX ADMIN — Medication 800 MILLIGRAM(S): at 14:12

## 2018-02-22 RX ADMIN — FENTANYL CITRATE 25 MICROGRAM(S): 50 INJECTION INTRAVENOUS at 11:39

## 2018-02-22 RX ADMIN — Medication 15 MILLIGRAM(S): at 18:03

## 2018-02-22 RX ADMIN — FENTANYL CITRATE 25 MICROGRAM(S): 50 INJECTION INTRAVENOUS at 10:33

## 2018-02-22 RX ADMIN — Medication 800 MILLIGRAM(S): at 14:11

## 2018-02-22 RX ADMIN — SODIUM CHLORIDE 75 MILLILITER(S): 9 INJECTION, SOLUTION INTRAVENOUS at 17:57

## 2018-02-22 RX ADMIN — FENTANYL CITRATE 25 MICROGRAM(S): 50 INJECTION INTRAVENOUS at 11:09

## 2018-02-22 RX ADMIN — FENTANYL CITRATE 25 MICROGRAM(S): 50 INJECTION INTRAVENOUS at 10:03

## 2018-02-22 RX ADMIN — ENOXAPARIN SODIUM 40 MILLIGRAM(S): 100 INJECTION SUBCUTANEOUS at 14:11

## 2018-02-22 RX ADMIN — ONDANSETRON 4 MILLIGRAM(S): 8 TABLET, FILM COATED ORAL at 10:04

## 2018-02-22 RX ADMIN — Medication 100 MILLIGRAM(S): at 07:50

## 2018-02-22 RX ADMIN — Medication 15 MILLIGRAM(S): at 12:22

## 2018-02-22 RX ADMIN — Medication 15 MILLIGRAM(S): at 23:56

## 2018-02-22 RX ADMIN — Medication 15 MILLIGRAM(S): at 17:56

## 2018-02-22 RX ADMIN — Medication 15 MILLIGRAM(S): at 23:41

## 2018-02-22 NOTE — PROGRESS NOTE ADULT - SUBJECTIVE AND OBJECTIVE BOX
INTERVAL HPI/OVERNIGHT EVENTS:    SUBJECTIVE:  s/p Ileostomy reversal. No active complaints. ambulating. +voiding. No active complaints at this time.    MEDICATIONS  (STANDING):  aspirin  chewable 81 milliGRAM(s) Oral daily  carvedilol 6.25 milliGRAM(s) Oral every 12 hours  enoxaparin Injectable 40 milliGRAM(s) SubCutaneous daily  ibuprofen  Tablet 800 milliGRAM(s) Oral every 8 hours  ketorolac   Injectable 15 milliGRAM(s) IV Push every 6 hours  lactated ringers. 1000 milliLiter(s) (75 mL/Hr) IV Continuous <Continuous>  losartan 25 milliGRAM(s) Oral daily  pantoprazole    Tablet 40 milliGRAM(s) Oral before breakfast    MEDICATIONS  (PRN):  ALPRAZolam 0.25 milliGRAM(s) Oral every 8 hours PRN Anxeity  ondansetron Injectable 4 milliGRAM(s) IV Push every 6 hours PRN Nausea  traMADol 50 milliGRAM(s) Oral every 6 hours PRN Moderate Pain (4 - 6)      Vital Signs Last 24 Hrs  T(C): 36.4 (22 Feb 2018 16:29), Max: 36.6 (22 Feb 2018 13:45)  T(F): 97.5 (22 Feb 2018 16:29), Max: 97.8 (22 Feb 2018 13:45)  HR: 62 (22 Feb 2018 16:29) (50 - 64)  BP: 108/57 (22 Feb 2018 16:29) (92/35 - 116/66)  BP(mean): --  RR: 18 (22 Feb 2018 16:29) (14 - 23)  SpO2: 97% (22 Feb 2018 16:29) (97% - 100%)    PE  Gen: NAD, AAOx3  Pulm: CTAB  CV: RRR   Abd: Soft, NT/ND. Dressing c/d/i  Ext: No gait abnormalities  Vasc: Cap refill <2s        I&O's Detail    22 Feb 2018 07:01  -  22 Feb 2018 16:38  --------------------------------------------------------  IN:  Total IN: 0 mL    OUT:    Voided: 150 mL  Total OUT: 150 mL    Total NET: -150 mL          LABS:                RADIOLOGY & ADDITIONAL STUDIES:

## 2018-02-22 NOTE — BRIEF OPERATIVE NOTE - PROCEDURE
<<-----Click on this checkbox to enter Procedure Closure, revision, or reversal of colostomy or ileostomy  02/22/2018  Reversal of loop ileostomy  Active  DEVONT

## 2018-02-22 NOTE — BRIEF OPERATIVE NOTE - OPERATION/FINDINGS
Loop ileostomy Hypertrophic skin at inferior edge of loop colostomy.  Soft adhesions to anterior abdominal wall. Hypertrophic skin at inferior edge of loop ileostomy.  Soft adhesions to anterior abdominal wall.

## 2018-02-23 LAB
ANION GAP SERPL CALC-SCNC: 9 MMOL/L — SIGNIFICANT CHANGE UP (ref 5–17)
BASOPHILS # BLD AUTO: 0 K/UL — SIGNIFICANT CHANGE UP (ref 0–0.2)
BASOPHILS NFR BLD AUTO: 0.1 % — SIGNIFICANT CHANGE UP (ref 0–2)
BUN SERPL-MCNC: 18 MG/DL — SIGNIFICANT CHANGE UP (ref 8–20)
CALCIUM SERPL-MCNC: 8.9 MG/DL — SIGNIFICANT CHANGE UP (ref 8.6–10.2)
CHLORIDE SERPL-SCNC: 104 MMOL/L — SIGNIFICANT CHANGE UP (ref 98–107)
CO2 SERPL-SCNC: 24 MMOL/L — SIGNIFICANT CHANGE UP (ref 22–29)
CREAT SERPL-MCNC: 0.78 MG/DL — SIGNIFICANT CHANGE UP (ref 0.5–1.3)
EOSINOPHIL # BLD AUTO: 0 K/UL — SIGNIFICANT CHANGE UP (ref 0–0.5)
EOSINOPHIL NFR BLD AUTO: 0 % — SIGNIFICANT CHANGE UP (ref 0–6)
GLUCOSE SERPL-MCNC: 112 MG/DL — SIGNIFICANT CHANGE UP (ref 70–115)
HCT VFR BLD CALC: 28 % — LOW (ref 37–47)
HGB BLD-MCNC: 9.2 G/DL — LOW (ref 12–16)
LYMPHOCYTES # BLD AUTO: 1 K/UL — SIGNIFICANT CHANGE UP (ref 1–4.8)
LYMPHOCYTES # BLD AUTO: 8.4 % — LOW (ref 20–55)
MAGNESIUM SERPL-MCNC: 1.9 MG/DL — SIGNIFICANT CHANGE UP (ref 1.6–2.6)
MCHC RBC-ENTMCNC: 29.6 PG — SIGNIFICANT CHANGE UP (ref 27–31)
MCHC RBC-ENTMCNC: 32.9 G/DL — SIGNIFICANT CHANGE UP (ref 32–36)
MCV RBC AUTO: 90 FL — SIGNIFICANT CHANGE UP (ref 81–99)
MONOCYTES # BLD AUTO: 0.4 K/UL — SIGNIFICANT CHANGE UP (ref 0–0.8)
MONOCYTES NFR BLD AUTO: 3.6 % — SIGNIFICANT CHANGE UP (ref 3–10)
NEUTROPHILS # BLD AUTO: 10.5 K/UL — HIGH (ref 1.8–8)
NEUTROPHILS NFR BLD AUTO: 87.6 % — HIGH (ref 37–73)
PHOSPHATE SERPL-MCNC: 3.1 MG/DL — SIGNIFICANT CHANGE UP (ref 2.4–4.7)
PLATELET # BLD AUTO: 192 K/UL — SIGNIFICANT CHANGE UP (ref 150–400)
POTASSIUM SERPL-MCNC: 4.5 MMOL/L — SIGNIFICANT CHANGE UP (ref 3.5–5.3)
POTASSIUM SERPL-SCNC: 4.5 MMOL/L — SIGNIFICANT CHANGE UP (ref 3.5–5.3)
RBC # BLD: 3.11 M/UL — LOW (ref 4.4–5.2)
RBC # FLD: 13.5 % — SIGNIFICANT CHANGE UP (ref 11–15.6)
SODIUM SERPL-SCNC: 137 MMOL/L — SIGNIFICANT CHANGE UP (ref 135–145)
WBC # BLD: 12 K/UL — HIGH (ref 4.8–10.8)
WBC # FLD AUTO: 12 K/UL — HIGH (ref 4.8–10.8)

## 2018-02-23 RX ORDER — OXYCODONE HYDROCHLORIDE 5 MG/1
5 TABLET ORAL EVERY 4 HOURS
Qty: 0 | Refills: 0 | Status: DISCONTINUED | OUTPATIENT
Start: 2018-02-23 | End: 2018-02-23

## 2018-02-23 RX ORDER — OXYCODONE HYDROCHLORIDE 5 MG/1
10 TABLET ORAL EVERY 4 HOURS
Qty: 0 | Refills: 0 | Status: DISCONTINUED | OUTPATIENT
Start: 2018-02-23 | End: 2018-02-23

## 2018-02-23 RX ORDER — ACETAMINOPHEN 500 MG
1000 TABLET ORAL ONCE
Qty: 0 | Refills: 0 | Status: COMPLETED | OUTPATIENT
Start: 2018-02-23 | End: 2018-02-23

## 2018-02-23 RX ORDER — ACETAMINOPHEN 500 MG
650 TABLET ORAL EVERY 6 HOURS
Qty: 0 | Refills: 0 | Status: DISCONTINUED | OUTPATIENT
Start: 2018-02-23 | End: 2018-03-03

## 2018-02-23 RX ORDER — ACETAMINOPHEN 500 MG
650 TABLET ORAL EVERY 6 HOURS
Qty: 0 | Refills: 0 | Status: DISCONTINUED | OUTPATIENT
Start: 2018-02-23 | End: 2018-02-23

## 2018-02-23 RX ADMIN — Medication 15 MILLIGRAM(S): at 12:00

## 2018-02-23 RX ADMIN — ENOXAPARIN SODIUM 40 MILLIGRAM(S): 100 INJECTION SUBCUTANEOUS at 12:17

## 2018-02-23 RX ADMIN — Medication 650 MILLIGRAM(S): at 23:50

## 2018-02-23 RX ADMIN — Medication 400 MILLIGRAM(S): at 08:30

## 2018-02-23 RX ADMIN — Medication 400 MILLIGRAM(S): at 16:17

## 2018-02-23 RX ADMIN — Medication 15 MILLIGRAM(S): at 05:33

## 2018-02-23 RX ADMIN — Medication 1000 MILLIGRAM(S): at 16:19

## 2018-02-23 RX ADMIN — SODIUM CHLORIDE 75 MILLILITER(S): 9 INJECTION, SOLUTION INTRAVENOUS at 16:18

## 2018-02-23 RX ADMIN — Medication 15 MILLIGRAM(S): at 12:17

## 2018-02-23 RX ADMIN — Medication 81 MILLIGRAM(S): at 12:17

## 2018-02-23 RX ADMIN — Medication 650 MILLIGRAM(S): at 22:56

## 2018-02-23 RX ADMIN — Medication 1000 MILLIGRAM(S): at 08:30

## 2018-02-23 RX ADMIN — Medication 15 MILLIGRAM(S): at 05:18

## 2018-02-23 RX ADMIN — SODIUM CHLORIDE 75 MILLILITER(S): 9 INJECTION, SOLUTION INTRAVENOUS at 05:20

## 2018-02-23 NOTE — PROGRESS NOTE ADULT - SUBJECTIVE AND OBJECTIVE BOX
INTERVAL HPI/OVERNIGHT EVENTS:  s/p Ileostomy reversal. No active complaints. ambulating. +voiding. No active complaints at this time.  tolerated sips/chips. no bowel function yet.      MEDICATIONS  (STANDING):  acetaminophen  IVPB. 1000 milliGRAM(s) IV Intermittent once  aspirin  chewable 81 milliGRAM(s) Oral daily  carvedilol 6.25 milliGRAM(s) Oral every 12 hours  enoxaparin Injectable 40 milliGRAM(s) SubCutaneous daily  ibuprofen  Tablet 800 milliGRAM(s) Oral every 8 hours  ketorolac   Injectable 15 milliGRAM(s) IV Push every 6 hours  lactated ringers. 1000 milliLiter(s) (75 mL/Hr) IV Continuous <Continuous>  losartan 25 milliGRAM(s) Oral daily  pantoprazole    Tablet 40 milliGRAM(s) Oral before breakfast    MEDICATIONS  (PRN):  ALPRAZolam 0.25 milliGRAM(s) Oral every 8 hours PRN Anxeity  ondansetron Injectable 4 milliGRAM(s) IV Push every 6 hours PRN Nausea  traMADol 50 milliGRAM(s) Oral every 6 hours PRN Moderate Pain (4 - 6)      Vital Signs Last 24 Hrs  T(C): 36.8 (23 Feb 2018 08:14), Max: 36.8 (23 Feb 2018 04:39)  T(F): 98.3 (23 Feb 2018 08:14), Max: 98.3 (23 Feb 2018 04:39)  HR: 68 (23 Feb 2018 08:14) (50 - 78)  BP: 92/49 (23 Feb 2018 08:14) (85/42 - 122/63)  BP(mean): --  RR: 18 (23 Feb 2018 08:14) (14 - 23)  SpO2: 98% (23 Feb 2018 08:14) (97% - 100%)    PE  Gen: NAD, AAOx3  Pulm: CTAB  CV: RRR   Abd: Soft, NT/ND. Dressing c/d/i  Ext: No gait abnormalities      I&O's Detail    22 Feb 2018 07:01  -  23 Feb 2018 07:00  --------------------------------------------------------  IN:    Lactated Ringers IV Bolus: 500 mL    lactated ringers.: 1200 mL  Total IN: 1700 mL    OUT:    Voided: 500 mL  Total OUT: 500 mL    Total NET: 1200 mL          LABS:                        9.2    12.0  )-----------( 192      ( 23 Feb 2018 06:34 )             28.0     02-23    137  |  104  |  18.0  ----------------------------<  112  4.5   |  24.0  |  0.78    Ca    8.9      23 Feb 2018 06:34  Phos  3.1     02-23  Mg     1.9     02-23            RADIOLOGY & ADDITIONAL STUDIES:

## 2018-02-23 NOTE — PROGRESS NOTE ADULT - SUBJECTIVE AND OBJECTIVE BOX
Pt seen, chart reviewed.  S/p Reversal of Loop Ileostomy, POD#1.  VSS.  Adequate pain control.  Resting comfortably.   Tolerating Clears.  No N/V.    No anesthesia problems noted.

## 2018-02-24 LAB
ANION GAP SERPL CALC-SCNC: 8 MMOL/L — SIGNIFICANT CHANGE UP (ref 5–17)
BUN SERPL-MCNC: 15 MG/DL — SIGNIFICANT CHANGE UP (ref 8–20)
CALCIUM SERPL-MCNC: 8.5 MG/DL — LOW (ref 8.6–10.2)
CHLORIDE SERPL-SCNC: 102 MMOL/L — SIGNIFICANT CHANGE UP (ref 98–107)
CO2 SERPL-SCNC: 24 MMOL/L — SIGNIFICANT CHANGE UP (ref 22–29)
CREAT SERPL-MCNC: 0.6 MG/DL — SIGNIFICANT CHANGE UP (ref 0.5–1.3)
EOSINOPHIL # BLD AUTO: 0 K/UL — SIGNIFICANT CHANGE UP (ref 0–0.5)
EOSINOPHIL NFR BLD AUTO: 0 % — SIGNIFICANT CHANGE UP (ref 0–6)
GLUCOSE SERPL-MCNC: 117 MG/DL — HIGH (ref 70–115)
HCT VFR BLD CALC: 30.5 % — LOW (ref 37–47)
HGB BLD-MCNC: 9.9 G/DL — LOW (ref 12–16)
LYMPHOCYTES # BLD AUTO: 0.7 K/UL — LOW (ref 1–4.8)
LYMPHOCYTES # BLD AUTO: 9.1 % — LOW (ref 20–55)
MAGNESIUM SERPL-MCNC: 1.8 MG/DL — SIGNIFICANT CHANGE UP (ref 1.6–2.6)
MCHC RBC-ENTMCNC: 29.3 PG — SIGNIFICANT CHANGE UP (ref 27–31)
MCHC RBC-ENTMCNC: 32.5 G/DL — SIGNIFICANT CHANGE UP (ref 32–36)
MCV RBC AUTO: 90.2 FL — SIGNIFICANT CHANGE UP (ref 81–99)
MONOCYTES # BLD AUTO: 0.3 K/UL — SIGNIFICANT CHANGE UP (ref 0–0.8)
MONOCYTES NFR BLD AUTO: 3.7 % — SIGNIFICANT CHANGE UP (ref 3–10)
NEUTROPHILS # BLD AUTO: 6.8 K/UL — SIGNIFICANT CHANGE UP (ref 1.8–8)
NEUTROPHILS NFR BLD AUTO: 87.1 % — HIGH (ref 37–73)
PLATELET # BLD AUTO: 187 K/UL — SIGNIFICANT CHANGE UP (ref 150–400)
POTASSIUM SERPL-MCNC: 4 MMOL/L — SIGNIFICANT CHANGE UP (ref 3.5–5.3)
POTASSIUM SERPL-SCNC: 4 MMOL/L — SIGNIFICANT CHANGE UP (ref 3.5–5.3)
RBC # BLD: 3.38 M/UL — LOW (ref 4.4–5.2)
RBC # FLD: 13.5 % — SIGNIFICANT CHANGE UP (ref 11–15.6)
SODIUM SERPL-SCNC: 134 MMOL/L — LOW (ref 135–145)
WBC # BLD: 7.8 K/UL — SIGNIFICANT CHANGE UP (ref 4.8–10.8)
WBC # FLD AUTO: 7.8 K/UL — SIGNIFICANT CHANGE UP (ref 4.8–10.8)

## 2018-02-24 RX ADMIN — ENOXAPARIN SODIUM 40 MILLIGRAM(S): 100 INJECTION SUBCUTANEOUS at 12:05

## 2018-02-24 RX ADMIN — Medication 650 MILLIGRAM(S): at 06:15

## 2018-02-24 RX ADMIN — Medication 650 MILLIGRAM(S): at 12:06

## 2018-02-24 RX ADMIN — Medication 650 MILLIGRAM(S): at 13:00

## 2018-02-24 RX ADMIN — Medication 650 MILLIGRAM(S): at 17:46

## 2018-02-24 RX ADMIN — PANTOPRAZOLE SODIUM 40 MILLIGRAM(S): 20 TABLET, DELAYED RELEASE ORAL at 05:49

## 2018-02-24 RX ADMIN — LOSARTAN POTASSIUM 25 MILLIGRAM(S): 100 TABLET, FILM COATED ORAL at 05:48

## 2018-02-24 RX ADMIN — Medication 650 MILLIGRAM(S): at 18:15

## 2018-02-24 RX ADMIN — Medication 81 MILLIGRAM(S): at 12:06

## 2018-02-24 RX ADMIN — Medication 650 MILLIGRAM(S): at 05:48

## 2018-02-24 NOTE — PROGRESS NOTE ADULT - SUBJECTIVE AND OBJECTIVE BOX
INTERVAL HPI/OVERNIGHT EVENTS: No acute events overnight.    SUBJECTIVE: Tolerating clear liquid diet without n/v. Passing flatus and soft BMs. No fevers, chills, chest pain, dyspnea. Ambulating. Pain controlled.      MEDICATIONS  (STANDING):  acetaminophen   Tablet. 650 milliGRAM(s) Oral every 6 hours  aspirin  chewable 81 milliGRAM(s) Oral daily  carvedilol 6.25 milliGRAM(s) Oral every 12 hours  enoxaparin Injectable 40 milliGRAM(s) SubCutaneous daily  losartan 25 milliGRAM(s) Oral daily  pantoprazole    Tablet 40 milliGRAM(s) Oral before breakfast    MEDICATIONS  (PRN):  ALPRAZolam 0.25 milliGRAM(s) Oral every 8 hours PRN Anxeity  ondansetron Injectable 4 milliGRAM(s) IV Push every 6 hours PRN Nausea      Vital Signs Last 24 Hrs  T(C): 36.8 (24 Feb 2018 08:38), Max: 37.1 (23 Feb 2018 15:38)  T(F): 98.2 (24 Feb 2018 08:38), Max: 98.7 (23 Feb 2018 15:38)  HR: 88 (24 Feb 2018 08:38) (67 - 89)  BP: 99/54 (24 Feb 2018 08:38) (93/51 - 102/56)  BP(mean): --  RR: 18 (24 Feb 2018 08:38) (18 - 18)  SpO2: 96% (24 Feb 2018 08:38) (96% - 98%)    Physical exam:  General: NAD, AOx3, resting comfortably in bed  HEENT: PERRLA, EOMI  Neck: supple, nontender  Respiratory: no respiratory distress, lungs CTAB  Heart: regular rate and rhythm, no murmurs  Abdomen: soft, nontender, nondistended. Normal bowel sounds. No guarding or rebound. Ostomy takedown site clean, dry, intact, covered with gauze and wick. No surrounding erythema.  Extremities: no peripheral edema. Normal ROM.      I&O's Detail    23 Feb 2018 07:01  -  24 Feb 2018 07:00  --------------------------------------------------------  IN:    lactated ringers.: 900 mL  Total IN: 900 mL    OUT:  Total OUT: 0 mL    Total NET: 900 mL      LABS:                        9.9    7.8   )-----------( 187      ( 24 Feb 2018 07:10 )             30.5     02-24    134<L>  |  102  |  15.0  ----------------------------<  117<H>  4.0   |  24.0  |  0.60    Ca    8.5<L>      24 Feb 2018 07:01  Phos  3.1     02-23  Mg     1.8     02-24

## 2018-02-25 RX ADMIN — Medication 650 MILLIGRAM(S): at 13:30

## 2018-02-25 RX ADMIN — Medication 650 MILLIGRAM(S): at 06:01

## 2018-02-25 RX ADMIN — ENOXAPARIN SODIUM 40 MILLIGRAM(S): 100 INJECTION SUBCUTANEOUS at 12:52

## 2018-02-25 RX ADMIN — Medication 650 MILLIGRAM(S): at 12:52

## 2018-02-25 RX ADMIN — Medication 650 MILLIGRAM(S): at 06:28

## 2018-02-25 RX ADMIN — Medication 81 MILLIGRAM(S): at 12:52

## 2018-02-25 RX ADMIN — CARVEDILOL PHOSPHATE 6.25 MILLIGRAM(S): 80 CAPSULE, EXTENDED RELEASE ORAL at 17:13

## 2018-02-25 RX ADMIN — PANTOPRAZOLE SODIUM 40 MILLIGRAM(S): 20 TABLET, DELAYED RELEASE ORAL at 06:01

## 2018-02-25 RX ADMIN — Medication 650 MILLIGRAM(S): at 17:13

## 2018-02-25 RX ADMIN — LOSARTAN POTASSIUM 25 MILLIGRAM(S): 100 TABLET, FILM COATED ORAL at 12:51

## 2018-02-25 RX ADMIN — Medication 650 MILLIGRAM(S): at 18:00

## 2018-02-25 NOTE — PROGRESS NOTE ADULT - SUBJECTIVE AND OBJECTIVE BOX
INTERVAL HPI/OVERNIGHT EVENTS:    SUBJECTIVE:      MEDICATIONS  (STANDING):  acetaminophen   Tablet. 650 milliGRAM(s) Oral every 6 hours  aspirin  chewable 81 milliGRAM(s) Oral daily  carvedilol 6.25 milliGRAM(s) Oral every 12 hours  enoxaparin Injectable 40 milliGRAM(s) SubCutaneous daily  losartan 25 milliGRAM(s) Oral daily  pantoprazole    Tablet 40 milliGRAM(s) Oral before breakfast    MEDICATIONS  (PRN):  ALPRAZolam 0.25 milliGRAM(s) Oral every 8 hours PRN Anxeity  ondansetron Injectable 4 milliGRAM(s) IV Push every 6 hours PRN Nausea      Vital Signs Last 24 Hrs  T(C): 37.1 (25 Feb 2018 08:33), Max: 37.2 (24 Feb 2018 20:12)  T(F): 98.8 (25 Feb 2018 08:33), Max: 98.9 (24 Feb 2018 20:12)  HR: 97 (25 Feb 2018 08:33) (97 - 108)  BP: 91/62 (25 Feb 2018 08:33) (91/62 - 111/60)  BP(mean): --  RR: 18 (25 Feb 2018 08:33) (18 - 19)  SpO2: 97% (25 Feb 2018 08:33) (96% - 99%)    Physical exam:  General: NAD, AOx3, resting comfortably in bed  HEENT: PERRLA, EOMI  Neck: supple, nontender  Respiratory: no respiratory distress, lungs CTAB  Heart: regular rate and rhythm, no murmurs  Abdomen: soft, nontender, nondistended. Normal bowel sounds. No guarding or rebound.  Extremities: no peripheral edema. Normal ROM.      I&O's Detail      LABS:                        9.9    7.8   )-----------( 187      ( 24 Feb 2018 07:10 )             30.5     02-24    134<L>  |  102  |  15.0  ----------------------------<  117<H>  4.0   |  24.0  |  0.60    Ca    8.5<L>      24 Feb 2018 07:01  Mg     1.8     02-24            RADIOLOGY & ADDITIONAL STUDIES: INTERVAL HPI/OVERNIGHT EVENTS: No acute events overnight.    SUBJECTIVE: Tolerating regular diet without n/v/d. No fevers, chills, chest pain, dyspnea. Pain controlled. Ambulating. Passing flatus and having bowel movements.      MEDICATIONS  (STANDING):  acetaminophen   Tablet. 650 milliGRAM(s) Oral every 6 hours  aspirin  chewable 81 milliGRAM(s) Oral daily  carvedilol 6.25 milliGRAM(s) Oral every 12 hours  enoxaparin Injectable 40 milliGRAM(s) SubCutaneous daily  losartan 25 milliGRAM(s) Oral daily  pantoprazole    Tablet 40 milliGRAM(s) Oral before breakfast    MEDICATIONS  (PRN):  ALPRAZolam 0.25 milliGRAM(s) Oral every 8 hours PRN Anxeity  ondansetron Injectable 4 milliGRAM(s) IV Push every 6 hours PRN Nausea      Vital Signs Last 24 Hrs  T(C): 37.1 (25 Feb 2018 08:33), Max: 37.2 (24 Feb 2018 20:12)  T(F): 98.8 (25 Feb 2018 08:33), Max: 98.9 (24 Feb 2018 20:12)  HR: 97 (25 Feb 2018 08:33) (97 - 108)  BP: 91/62 (25 Feb 2018 08:33) (91/62 - 111/60)  BP(mean): --  RR: 18 (25 Feb 2018 08:33) (18 - 19)  SpO2: 97% (25 Feb 2018 08:33) (96% - 99%)    Physical exam:  General: NAD, AOx3, resting comfortably in bed  HEENT: PERRLA, EOMI  Neck: supple, nontender  Respiratory: no respiratory distress, lungs CTAB  Heart: regular rate and rhythm, no murmurs  Abdomen: soft, nontender, nondistended. Normal bowel sounds. No guarding or rebound. Appropriately healing ostomy reversal wound  Extremities: no peripheral edema. Normal ROM.      I&O's Detail      LABS:                        9.9    7.8   )-----------( 187      ( 24 Feb 2018 07:10 )             30.5     02-24    134<L>  |  102  |  15.0  ----------------------------<  117<H>  4.0   |  24.0  |  0.60    Ca    8.5<L>      24 Feb 2018 07:01  Mg     1.8     02-24            RADIOLOGY & ADDITIONAL STUDIES:

## 2018-02-26 ENCOUNTER — TRANSCRIPTION ENCOUNTER (OUTPATIENT)
Age: 78
End: 2018-02-26

## 2018-02-26 DIAGNOSIS — Z93.2 ILEOSTOMY STATUS: ICD-10-CM

## 2018-02-26 PROCEDURE — 74018 RADEX ABDOMEN 1 VIEW: CPT | Mod: 26

## 2018-02-26 RX ORDER — SODIUM CHLORIDE 9 MG/ML
1000 INJECTION, SOLUTION INTRAVENOUS
Qty: 0 | Refills: 0 | Status: DISCONTINUED | OUTPATIENT
Start: 2018-02-26 | End: 2018-02-27

## 2018-02-26 RX ADMIN — Medication 81 MILLIGRAM(S): at 12:22

## 2018-02-26 RX ADMIN — Medication 650 MILLIGRAM(S): at 05:29

## 2018-02-26 RX ADMIN — ENOXAPARIN SODIUM 40 MILLIGRAM(S): 100 INJECTION SUBCUTANEOUS at 12:22

## 2018-02-26 RX ADMIN — PANTOPRAZOLE SODIUM 40 MILLIGRAM(S): 20 TABLET, DELAYED RELEASE ORAL at 05:29

## 2018-02-26 RX ADMIN — Medication 650 MILLIGRAM(S): at 05:43

## 2018-02-26 RX ADMIN — Medication 650 MILLIGRAM(S): at 12:22

## 2018-02-26 RX ADMIN — Medication 650 MILLIGRAM(S): at 12:24

## 2018-02-26 NOTE — PROGRESS NOTE ADULT - SUBJECTIVE AND OBJECTIVE BOX
INTERVAL HPI/OVERNIGHT EVENTS:    SUBJECTIVE:  No overnight events. Tolerating diet, +OOB, +ambulating, +flatus, +BM. No active complaints at this time.    MEDICATIONS  (STANDING):  acetaminophen   Tablet. 650 milliGRAM(s) Oral every 6 hours  aspirin  chewable 81 milliGRAM(s) Oral daily  carvedilol 6.25 milliGRAM(s) Oral every 12 hours  dextrose 5% + sodium chloride 0.45%. 1000 milliLiter(s) (60 mL/Hr) IV Continuous <Continuous>  enoxaparin Injectable 40 milliGRAM(s) SubCutaneous daily  losartan 25 milliGRAM(s) Oral daily  pantoprazole    Tablet 40 milliGRAM(s) Oral before breakfast    MEDICATIONS  (PRN):  ALPRAZolam 0.25 milliGRAM(s) Oral every 8 hours PRN Anxeity  ondansetron Injectable 4 milliGRAM(s) IV Push every 6 hours PRN Nausea      Vital Signs Last 24 Hrs  T(C): 37.1 (26 Feb 2018 08:30), Max: 37.1 (26 Feb 2018 08:30)  T(F): 98.7 (26 Feb 2018 08:30), Max: 98.7 (26 Feb 2018 08:30)  HR: 90 (26 Feb 2018 08:30) (90 - 104)  BP: 111/66 (26 Feb 2018 08:30) (106/71 - 118/73)  BP(mean): --  RR: 20 (26 Feb 2018 08:30) (17 - 20)  SpO2: 96% (26 Feb 2018 08:30) (94% - 98%)    PE  Gen: NAD, AAOx3  Pulm: CTAB  CV: RRR  Abd: Soft, NT/ND. Dressing c/d/i. good granulation tissue in wound.   Ext: Moving all 4   Vasc: Cap refill <2s    I&O's Detail      LABS:                RADIOLOGY & ADDITIONAL STUDIES: INTERVAL HPI/OVERNIGHT EVENTS:    SUBJECTIVE:  No overnight events. 1 episode of emesis,, +OOB, +ambulating, +flatus, +BM. No active complaints at this time.    MEDICATIONS  (STANDING):  acetaminophen   Tablet. 650 milliGRAM(s) Oral every 6 hours  aspirin  chewable 81 milliGRAM(s) Oral daily  carvedilol 6.25 milliGRAM(s) Oral every 12 hours  dextrose 5% + sodium chloride 0.45%. 1000 milliLiter(s) (60 mL/Hr) IV Continuous <Continuous>  enoxaparin Injectable 40 milliGRAM(s) SubCutaneous daily  losartan 25 milliGRAM(s) Oral daily  pantoprazole    Tablet 40 milliGRAM(s) Oral before breakfast    MEDICATIONS  (PRN):  ALPRAZolam 0.25 milliGRAM(s) Oral every 8 hours PRN Anxeity  ondansetron Injectable 4 milliGRAM(s) IV Push every 6 hours PRN Nausea      Vital Signs Last 24 Hrs  T(C): 37.1 (26 Feb 2018 08:30), Max: 37.1 (26 Feb 2018 08:30)  T(F): 98.7 (26 Feb 2018 08:30), Max: 98.7 (26 Feb 2018 08:30)  HR: 90 (26 Feb 2018 08:30) (90 - 104)  BP: 111/66 (26 Feb 2018 08:30) (106/71 - 118/73)  BP(mean): --  RR: 20 (26 Feb 2018 08:30) (17 - 20)  SpO2: 96% (26 Feb 2018 08:30) (94% - 98%)    PE  Gen: NAD, AAOx3  Pulm: CTAB  CV: RRR  Abd: Soft, NT/ND. Dressing c/d/i. good granulation tissue in wound.   Ext: Moving all 4   Vasc: Cap refill <2s    I&O's Detail      LABS:                RADIOLOGY & ADDITIONAL STUDIES:

## 2018-02-26 NOTE — DISCHARGE NOTE ADULT - HOSPITAL COURSE
2/22- Pt presented to [facility] for reversal of ileostomy. Pt tolerated the procedure well, she was extubated and transferred from PACU then floor. She remained hemodynamically stable.   Hospital stay was uneventful. Pain is well controlled, her diet was advanced and tolerating regular diet, she has bowel function, is passing flatus and stool. She worked with Physical therapy and was cleared to be discharged home with use of rolling walker at home. She remains hemodynamically well and stable, labs/vitals reviewed, within normal limits. The patient is ambulating without difficulty and voiding independently.   She has been cleared by [facility] attending to be discharged home with outpatient follow-up with [facility] clinic. 2/22- Pt presented to [facility] for reversal of ileostomy. Pt tolerated the procedure well, she was extubated and transferred from PACU then floor. She remained hemodynamically stable. Pt presented to [facility] for reversal of ileostomy. Pt tolerated the procedure well, she was extubated and transferred from PACU then floor. She remained hemodynamically stable. Patient's diet was gradually advanced and she was having bowel function, however post-op course then complicated by ileus. Ileus resolved and diet restarted - patient then able to tolerate diet well without abd pain, nausea, vomiting and bowel function continued. Pt remains hemodynamically well, tolerating diet, pain controlled. Stable for d/c with outpatient follow-up as outlined above.    Patient is advised to RETURN TO THE EMERGENCY DEPARTMENT for any of the following - worsening pain, fever/chills, nausea/vomiting, altered mental status, chest pain, shortness of breath, or any other new / worsening symptom.

## 2018-02-26 NOTE — DISCHARGE NOTE ADULT - CARE PLAN
Principal Discharge DX:	S/P ileostomy  Goal:	Alleviation of pain and symptoms, back to baseline  Assessment and plan of treatment:	WOUND CARE: A visiting nurse will help you with your dressings at home. Please apply gauze and tape over wound daily.   BATHING: Please do not submerge wound underwater. You may shower and/or sponge bathe.   ACTIVITY: No heavy lifting or straining. Otherwise, you may return to your usual level of physical activity.   DIET: Return to your usual diet, as tolerated.   NOTIFY YOUR SURGEON IF: You have any bleeding that does not stop, any pus draining from your wound(s), any fever (over 100.4 F) or chills, persistent nausea/vomiting, persistent diarrhea, chest pain, shortness of breath, or if your pain is not controlled on your discharge pain medications.  FOLLOW-UP: Please follow up with your primary care physician and [facility] clinic [phone] in one week regarding your hospitalization. Call for appointment upon discharge.   You may take tylenol for pain. Principal Discharge DX:	S/P ileostomy  Goal:	Alleviation of pain and symptoms, back to baseline  Assessment and plan of treatment:	Follow up: Please call and make an appointment with the [facility] 10-14 days after discharge. Also, please call and make an appointment with your primary care physician as per your usual schedule.   Activity: May return to normal activities as tolerated however refrain from heavy lifting > 10-15 lbs.  Diet: May continue regular diet.  Medications: Please take all home medications as prescribed by your primary care doctor. You may take tylenol for pain relief, as needed.  Wound Care: Please, keep wound site clean and dry. You may shower, but do not bathe. ** WOUND CARE ** - remove existing dressing, shower, allowing warm soapy water to run over wound. Pat dry with a clean towel. Dampen a sterile gauze with either sterile water or normal saline, and lightly place in abdominal wound. Cover with sterile gauze. Repeat daily.  Patient is advised to RETURN TO THE EMERGENCY DEPARTMENT for any of the following - worsening pain, fever/chills, nausea/vomiting, altered mental status, chest pain, shortness of breath, or any other new / worsening symptom. Principal Discharge DX:	S/P ileostomy  Goal:	Alleviation of pain and symptoms, back to baseline  Assessment and plan of treatment:	Follow up: Please call and make an appointment with the [facility] 10-14 days after discharge. Also, please call and make an appointment with your primary care physician as per your usual schedule.   Activity: May return to normal activities as tolerated however refrain from heavy lifting > 10-15 lbs.  Diet: May continue regular diet.  Medications: Please take all home medications as prescribed by your primary care doctor. You may take tylenol for pain relief, as needed.  Wound Care: Please, keep wound site clean and dry. You may shower, but do not bathe. ** WOUND CARE ** - remove existing dressing on your abdomen, shower, allowing warm soapy water to run over wound. Pat dry with a clean towel. Dampen a sterile gauze with either sterile water or normal saline, and lightly place in abdominal wound. Cover with sterile gauze. Repeat daily.  Patient is advised to RETURN TO THE EMERGENCY DEPARTMENT for any of the following - worsening pain, fever/chills, nausea/vomiting, altered mental status, chest pain, shortness of breath, or any other new / worsening symptom.

## 2018-02-26 NOTE — DISCHARGE NOTE ADULT - PROVIDER TOKENS
FREE:[LAST:[[facility]],PHONE:[phone]],FAX:[(   [phone]    -],ADDRESS:[[address] [place] [address], [place], [place], [address]]]

## 2018-02-26 NOTE — PROGRESS NOTE ADULT - PROBLEM SELECTOR PLAN 1
- D/C today with home aid  - Daily dressing changes  - Pain mgmt  - OOB and ambulate - FLD  - Daily dressing changes  - Pain mgmt  - OOB and ambulate

## 2018-02-26 NOTE — DISCHARGE NOTE ADULT - PLAN OF CARE
Alleviation of pain and symptoms, back to baseline WOUND CARE: A visiting nurse will help you with your dressings at home. Please apply gauze and tape over wound daily.   BATHING: Please do not submerge wound underwater. You may shower and/or sponge bathe.   ACTIVITY: No heavy lifting or straining. Otherwise, you may return to your usual level of physical activity.   DIET: Return to your usual diet, as tolerated.   NOTIFY YOUR SURGEON IF: You have any bleeding that does not stop, any pus draining from your wound(s), any fever (over 100.4 F) or chills, persistent nausea/vomiting, persistent diarrhea, chest pain, shortness of breath, or if your pain is not controlled on your discharge pain medications.  FOLLOW-UP: Please follow up with your primary care physician and Acute Care Surgery clinic [phone] in one week regarding your hospitalization. Call for appointment upon discharge.   You may take tylenol for pain. Follow up: Please call and make an appointment with the [facility] 10-14 days after discharge. Also, please call and make an appointment with your primary care physician as per your usual schedule.   Activity: May return to normal activities as tolerated however refrain from heavy lifting > 10-15 lbs.  Diet: May continue regular diet.  Medications: Please take all home medications as prescribed by your primary care doctor. You may take tylenol for pain relief, as needed.  Wound Care: Please, keep wound site clean and dry. You may shower, but do not bathe. ** WOUND CARE ** - remove existing dressing, shower, allowing warm soapy water to run over wound. Pat dry with a clean towel. Dampen a sterile gauze with either sterile water or normal saline, and lightly place in abdominal wound. Cover with sterile gauze. Repeat daily.  Patient is advised to RETURN TO THE EMERGENCY DEPARTMENT for any of the following - worsening pain, fever/chills, nausea/vomiting, altered mental status, chest pain, shortness of breath, or any other new / worsening symptom. Follow up: Please call and make an appointment with the [facility] 10-14 days after discharge. Also, please call and make an appointment with your primary care physician as per your usual schedule.   Activity: May return to normal activities as tolerated however refrain from heavy lifting > 10-15 lbs.  Diet: May continue regular diet.  Medications: Please take all home medications as prescribed by your primary care doctor. You may take tylenol for pain relief, as needed.  Wound Care: Please, keep wound site clean and dry. You may shower, but do not bathe. ** WOUND CARE ** - remove existing dressing on your abdomen, shower, allowing warm soapy water to run over wound. Pat dry with a clean towel. Dampen a sterile gauze with either sterile water or normal saline, and lightly place in abdominal wound. Cover with sterile gauze. Repeat daily.  Patient is advised to RETURN TO THE EMERGENCY DEPARTMENT for any of the following - worsening pain, fever/chills, nausea/vomiting, altered mental status, chest pain, shortness of breath, or any other new / worsening symptom.

## 2018-02-26 NOTE — PROGRESS NOTE ADULT - ATTENDING COMMENTS
Patient seen and examined.  tolerating liquids diet.  wound c/d/I  OOB, ambulate
Patient seen and examined.  NAd, abdomen soft , stoma site granulating well.  Cont local wound care  advance diet  Start set up for [facility] for wound care  anticipate d/c in the next 48 hrs
emesis last night and again following breakfast.    Abd soft, mild tenderness LLQ.      Unclear why emesis.  Will try on liquid diet and restart IV fluids for now.  Labs tomorrow.  If elevated WBC count consider imaging to evaluate for abscess.  If continues to vomit with liquids will need to make NPO and eval for NG tube need.

## 2018-02-26 NOTE — DISCHARGE NOTE ADULT - MEDICATION SUMMARY - MEDICATIONS TO TAKE
I will START or STAY ON the medications listed below when I get home from the hospital:    acetaminophen 325 mg oral tablet  -- 2 tab(s) by mouth every 6 hours, As needed, Mild Pain (1 - 3)  -- Indication: For pain    Aspirin Enteric Coated 81 mg oral delayed release tablet  -- 1 tab(s) by mouth once a day  -- Indication: For per pmd    irbesartan 150 mg oral tablet  -- 0.5 tab(s) by mouth once a day  -- Indication: For  per pmd    Xanax 0.25 mg oral tablet  -- 1 tab(s) by mouth 3 times a day, As Needed - for anxiety  -- Indication: For per pmd    Coreg 6.25 mg oral tablet  -- 1 tab(s) by mouth 2 times a day  -- Indication: For per pmd    Restasis 0.05% ophthalmic emulsion  -- 1 drop(s) to each affected eye every 12 hours  -- Indication: For per pmd I will START or STAY ON the medications listed below when I get home from the hospital:    acetaminophen 325 mg oral tablet  -- 2 tab(s) by mouth every 6 hours, As needed, Mild Pain (1 - 3)  -- Indication: For pain    Aspirin Enteric Coated 81 mg oral delayed release tablet  -- 1 tab(s) by mouth once a day  -- Indication: For per pmd    irbesartan 150 mg oral tablet  -- 0.5 tab(s) by mouth once a day  -- Indication: For  per pmd    Xanax 0.25 mg oral tablet  -- 1 tab(s) by mouth 3 times a day, As Needed - for anxiety  -- Indication: For per pmd    Coreg 6.25 mg oral tablet  -- 1 tab(s) by mouth 2 times a day  -- Indication: For per pmd    docusate sodium 100 mg oral capsule  -- 1 cap(s) by mouth 3 times a day  -- Indication: For Bowel regimen    senna oral tablet  -- 2 tab(s) by mouth once a day (at bedtime)  -- Indication: For Bowel regimen    Restasis 0.05% ophthalmic emulsion  -- 1 drop(s) to each affected eye every 12 hours  -- Indication: For per pmd

## 2018-02-26 NOTE — DISCHARGE NOTE ADULT - CARE PROVIDER_API CALL
[facility],   [address], first [place], [place], [address]  Phone: [phone]  Fax: (   )    -

## 2018-02-26 NOTE — DISCHARGE NOTE ADULT - PATIENT PORTAL LINK FT
You can access the [facility] Patient Portal, offered by [facility], by registering with the following website: [email or web address]

## 2018-02-27 LAB
ANION GAP SERPL CALC-SCNC: 11 MMOL/L — SIGNIFICANT CHANGE UP (ref 5–17)
BASOPHILS # BLD AUTO: 0 K/UL — SIGNIFICANT CHANGE UP (ref 0–0.2)
BASOPHILS NFR BLD AUTO: 0.1 % — SIGNIFICANT CHANGE UP (ref 0–2)
BUN SERPL-MCNC: 16 MG/DL — SIGNIFICANT CHANGE UP (ref 8–20)
CALCIUM SERPL-MCNC: 9.7 MG/DL — SIGNIFICANT CHANGE UP (ref 8.6–10.2)
CHLORIDE SERPL-SCNC: 98 MMOL/L — SIGNIFICANT CHANGE UP (ref 98–107)
CO2 SERPL-SCNC: 25 MMOL/L — SIGNIFICANT CHANGE UP (ref 22–29)
CREAT SERPL-MCNC: 0.77 MG/DL — SIGNIFICANT CHANGE UP (ref 0.5–1.3)
EOSINOPHIL # BLD AUTO: 0.1 K/UL — SIGNIFICANT CHANGE UP (ref 0–0.5)
EOSINOPHIL NFR BLD AUTO: 1.2 % — SIGNIFICANT CHANGE UP (ref 0–6)
GLUCOSE SERPL-MCNC: 123 MG/DL — HIGH (ref 70–115)
HCT VFR BLD CALC: 36.5 % — LOW (ref 37–47)
HGB BLD-MCNC: 11.9 G/DL — LOW (ref 12–16)
LYMPHOCYTES # BLD AUTO: 0.6 K/UL — LOW (ref 1–4.8)
LYMPHOCYTES # BLD AUTO: 7.5 % — LOW (ref 20–55)
MAGNESIUM SERPL-MCNC: 2 MG/DL — SIGNIFICANT CHANGE UP (ref 1.6–2.6)
MCHC RBC-ENTMCNC: 29.3 PG — SIGNIFICANT CHANGE UP (ref 27–31)
MCHC RBC-ENTMCNC: 32.6 G/DL — SIGNIFICANT CHANGE UP (ref 32–36)
MCV RBC AUTO: 89.9 FL — SIGNIFICANT CHANGE UP (ref 81–99)
MONOCYTES # BLD AUTO: 0.7 K/UL — SIGNIFICANT CHANGE UP (ref 0–0.8)
MONOCYTES NFR BLD AUTO: 8.3 % — SIGNIFICANT CHANGE UP (ref 3–10)
NEUTROPHILS # BLD AUTO: 6.8 K/UL — SIGNIFICANT CHANGE UP (ref 1.8–8)
NEUTROPHILS NFR BLD AUTO: 82.7 % — HIGH (ref 37–73)
PHOSPHATE SERPL-MCNC: 2.4 MG/DL — SIGNIFICANT CHANGE UP (ref 2.4–4.7)
PLATELET # BLD AUTO: 358 K/UL — SIGNIFICANT CHANGE UP (ref 150–400)
POTASSIUM SERPL-MCNC: 4.2 MMOL/L — SIGNIFICANT CHANGE UP (ref 3.5–5.3)
POTASSIUM SERPL-SCNC: 4.2 MMOL/L — SIGNIFICANT CHANGE UP (ref 3.5–5.3)
RBC # BLD: 4.06 M/UL — LOW (ref 4.4–5.2)
RBC # FLD: 13.2 % — SIGNIFICANT CHANGE UP (ref 11–15.6)
SODIUM SERPL-SCNC: 134 MMOL/L — LOW (ref 135–145)
SURGICAL PATHOLOGY FINAL REPORT - CH: SIGNIFICANT CHANGE UP
WBC # BLD: 8.2 K/UL — SIGNIFICANT CHANGE UP (ref 4.8–10.8)
WBC # FLD AUTO: 8.2 K/UL — SIGNIFICANT CHANGE UP (ref 4.8–10.8)

## 2018-02-27 PROCEDURE — 74177 CT ABD & PELVIS W/CONTRAST: CPT | Mod: 26

## 2018-02-27 RX ORDER — HYDROCORTISONE 20 MG
200 TABLET ORAL ONCE
Qty: 0 | Refills: 0 | Status: COMPLETED | OUTPATIENT
Start: 2018-02-27 | End: 2018-02-27

## 2018-02-27 RX ORDER — SODIUM CHLORIDE 9 MG/ML
1000 INJECTION, SOLUTION INTRAVENOUS
Qty: 0 | Refills: 0 | Status: DISCONTINUED | OUTPATIENT
Start: 2018-02-27 | End: 2018-03-01

## 2018-02-27 RX ORDER — DIPHENHYDRAMINE HCL 50 MG
50 CAPSULE ORAL ONCE
Qty: 0 | Refills: 0 | Status: COMPLETED | OUTPATIENT
Start: 2018-02-27 | End: 2018-02-27

## 2018-02-27 RX ADMIN — CARVEDILOL PHOSPHATE 6.25 MILLIGRAM(S): 80 CAPSULE, EXTENDED RELEASE ORAL at 05:27

## 2018-02-27 RX ADMIN — Medication 50 MILLIGRAM(S): at 18:20

## 2018-02-27 RX ADMIN — Medication 200 MILLIGRAM(S): at 14:43

## 2018-02-27 RX ADMIN — PANTOPRAZOLE SODIUM 40 MILLIGRAM(S): 20 TABLET, DELAYED RELEASE ORAL at 05:27

## 2018-02-27 RX ADMIN — SODIUM CHLORIDE 60 MILLILITER(S): 9 INJECTION, SOLUTION INTRAVENOUS at 21:20

## 2018-02-27 RX ADMIN — ENOXAPARIN SODIUM 40 MILLIGRAM(S): 100 INJECTION SUBCUTANEOUS at 13:42

## 2018-02-27 RX ADMIN — SODIUM CHLORIDE 60 MILLILITER(S): 9 INJECTION, SOLUTION INTRAVENOUS at 05:29

## 2018-02-27 NOTE — PROGRESS NOTE ADULT - SUBJECTIVE AND OBJECTIVE BOX
INTERVAL HPI/OVERNIGHT EVENTS:  had an episode of emesis overnight. no flatus yesterday.         MEDICATIONS  (STANDING):  acetaminophen   Tablet. 650 milliGRAM(s) Oral every 6 hours  aspirin  chewable 81 milliGRAM(s) Oral daily  carvedilol 6.25 milliGRAM(s) Oral every 12 hours  dextrose 5% + sodium chloride 0.45%. 1000 milliLiter(s) (60 mL/Hr) IV Continuous <Continuous>  enoxaparin Injectable 40 milliGRAM(s) SubCutaneous daily  losartan 25 milliGRAM(s) Oral daily  pantoprazole    Tablet 40 milliGRAM(s) Oral before breakfast    MEDICATIONS  (PRN):  ALPRAZolam 0.25 milliGRAM(s) Oral every 8 hours PRN Anxeity  ondansetron Injectable 4 milliGRAM(s) IV Push every 6 hours PRN Nausea      Vital Signs Last 24 Hrs  T(C): 37.3 (27 Feb 2018 08:19), Max: 37.3 (27 Feb 2018 08:19)  T(F): 99.1 (27 Feb 2018 08:19), Max: 99.1 (27 Feb 2018 08:19)  HR: 84 (27 Feb 2018 08:19) (84 - 92)  BP: 109/62 (27 Feb 2018 08:19) (109/62 - 115/69)  BP(mean): --  RR: 16 (27 Feb 2018 08:19) (16 - 18)  SpO2: 98% (27 Feb 2018 08:19) (96% - 98%)    PE  Gen: NAD, AAOx3  Pulm: CTAB  CV: RRR  Abd: Soft, NT/ND. Dressing c/d/i. good granulation tissue in wound.   Ext: Moving all 4   Vasc: Cap refill <2s    I&O's Detail    26 Feb 2018 07:01  -  27 Feb 2018 07:00  --------------------------------------------------------  IN:    dextrose 5% + sodium chloride 0.45%.: 1020 mL  Total IN: 1020 mL    OUT:    Emesis: 150 mL    Voided: 500 mL  Total OUT: 650 mL    Total NET: 370 mL          LABS:                RADIOLOGY & ADDITIONAL STUDIES:

## 2018-02-27 NOTE — CHART NOTE - NSCHARTNOTEFT_GEN_A_CORE
Pt seen and examined. Abdominal exam unchanged since previous exam with Dr. Lopez. Pt continues to experience mild lower abdominal pain but states that it is not worsening and might be improving. Still passing gas.

## 2018-02-28 LAB
ANION GAP SERPL CALC-SCNC: 12 MMOL/L — SIGNIFICANT CHANGE UP (ref 5–17)
BUN SERPL-MCNC: 20 MG/DL — SIGNIFICANT CHANGE UP (ref 8–20)
CALCIUM SERPL-MCNC: 8.8 MG/DL — SIGNIFICANT CHANGE UP (ref 8.6–10.2)
CHLORIDE SERPL-SCNC: 101 MMOL/L — SIGNIFICANT CHANGE UP (ref 98–107)
CO2 SERPL-SCNC: 24 MMOL/L — SIGNIFICANT CHANGE UP (ref 22–29)
CREAT SERPL-MCNC: 0.66 MG/DL — SIGNIFICANT CHANGE UP (ref 0.5–1.3)
GLUCOSE SERPL-MCNC: 97 MG/DL — SIGNIFICANT CHANGE UP (ref 70–115)
HCT VFR BLD CALC: 28.7 % — LOW (ref 37–47)
HGB BLD-MCNC: 9.3 G/DL — LOW (ref 12–16)
MAGNESIUM SERPL-MCNC: 1.9 MG/DL — SIGNIFICANT CHANGE UP (ref 1.6–2.6)
MCHC RBC-ENTMCNC: 28.8 PG — SIGNIFICANT CHANGE UP (ref 27–31)
MCHC RBC-ENTMCNC: 32.4 G/DL — SIGNIFICANT CHANGE UP (ref 32–36)
MCV RBC AUTO: 88.9 FL — SIGNIFICANT CHANGE UP (ref 81–99)
PHOSPHATE SERPL-MCNC: 3.1 MG/DL — SIGNIFICANT CHANGE UP (ref 2.4–4.7)
PLATELET # BLD AUTO: 279 K/UL — SIGNIFICANT CHANGE UP (ref 150–400)
POTASSIUM SERPL-MCNC: 3.7 MMOL/L — SIGNIFICANT CHANGE UP (ref 3.5–5.3)
POTASSIUM SERPL-SCNC: 3.7 MMOL/L — SIGNIFICANT CHANGE UP (ref 3.5–5.3)
RBC # BLD: 3.23 M/UL — LOW (ref 4.4–5.2)
RBC # FLD: 13.3 % — SIGNIFICANT CHANGE UP (ref 11–15.6)
SODIUM SERPL-SCNC: 137 MMOL/L — SIGNIFICANT CHANGE UP (ref 135–145)
WBC # BLD: 6.2 K/UL — SIGNIFICANT CHANGE UP (ref 4.8–10.8)
WBC # FLD AUTO: 6.2 K/UL — SIGNIFICANT CHANGE UP (ref 4.8–10.8)

## 2018-02-28 RX ORDER — LOSARTAN POTASSIUM 100 MG/1
25 TABLET, FILM COATED ORAL DAILY
Qty: 0 | Refills: 0 | Status: DISCONTINUED | OUTPATIENT
Start: 2018-03-01 | End: 2018-03-03

## 2018-02-28 RX ORDER — POTASSIUM CHLORIDE 20 MEQ
40 PACKET (EA) ORAL ONCE
Qty: 0 | Refills: 0 | Status: DISCONTINUED | OUTPATIENT
Start: 2018-02-28 | End: 2018-03-03

## 2018-02-28 RX ORDER — DOCUSATE SODIUM 100 MG
100 CAPSULE ORAL THREE TIMES A DAY
Qty: 0 | Refills: 0 | Status: DISCONTINUED | OUTPATIENT
Start: 2018-02-28 | End: 2018-03-03

## 2018-02-28 RX ORDER — SENNA PLUS 8.6 MG/1
2 TABLET ORAL AT BEDTIME
Qty: 0 | Refills: 0 | Status: DISCONTINUED | OUTPATIENT
Start: 2018-02-28 | End: 2018-03-03

## 2018-02-28 RX ADMIN — Medication 81 MILLIGRAM(S): at 11:41

## 2018-02-28 RX ADMIN — LOSARTAN POTASSIUM 25 MILLIGRAM(S): 100 TABLET, FILM COATED ORAL at 22:22

## 2018-02-28 RX ADMIN — SENNA PLUS 2 TABLET(S): 8.6 TABLET ORAL at 22:22

## 2018-02-28 RX ADMIN — CARVEDILOL PHOSPHATE 6.25 MILLIGRAM(S): 80 CAPSULE, EXTENDED RELEASE ORAL at 18:01

## 2018-02-28 RX ADMIN — CARVEDILOL PHOSPHATE 6.25 MILLIGRAM(S): 80 CAPSULE, EXTENDED RELEASE ORAL at 05:26

## 2018-02-28 RX ADMIN — Medication 100 MILLIGRAM(S): at 22:22

## 2018-02-28 RX ADMIN — ENOXAPARIN SODIUM 40 MILLIGRAM(S): 100 INJECTION SUBCUTANEOUS at 11:41

## 2018-02-28 NOTE — PHYSICAL THERAPY INITIAL EVALUATION ADULT - MANUAL MUSCLE TESTING RESULTS, REHAB EVAL
no strength deficits were identified/except vivian LE: 4-/5 throughout
except vivian LE: 4-/5 throughout/no strength deficits were identified

## 2018-02-28 NOTE — CHART NOTE - NSCHARTNOTEFT_GEN_A_CORE
Patient seen and examined.  CT A/P reviewed w/ Dr. Alondra Zhong of radiology.  Patient comfortably sitting in a chair at bedside.  Says her lower abdominal "soreness" has improved, now rated 4/10 (down from 6/10).  Denies N/V.  Says she was able drink the contrast without vomiting or pain.  +flatus.  Ambulating freely.  Afebrile.  HD well.  No leukocytosis.  Abdomen: soft, NT, ND.  Dressing to old ileostomy site changed  - wound looks good.  While radiological findings can be of concern, patient is clinically non-toxic and has a benign abdomen.  Will monitor vitals, labs and continue serial abdominal exams.

## 2018-02-28 NOTE — PHYSICAL THERAPY INITIAL EVALUATION ADULT - ADDITIONAL COMMENTS
states owns a RW has given it to a friend recently however will be getting it back, 5+5 steps 1 rail to enter home
states owns a RW has given it to a friend recently however will be getting it back, 5+5 steps 1 rail to enter home

## 2018-02-28 NOTE — DIETITIAN INITIAL EVALUATION ADULT. - OTHER INFO
pt lost weight at time of sx 7 months ago and has been maintaining current weight. here for reversal of ileostomy and is hopeful to gain back weight.

## 2018-02-28 NOTE — PHYSICAL THERAPY INITIAL EVALUATION ADULT - GAIT PATTERN USED, PT EVAL
(-) loss of balance or SOB with either trials, decreased gait velocity
improved balance and activity tolerance noted with RW vs no device

## 2018-02-28 NOTE — PHYSICAL THERAPY INITIAL EVALUATION ADULT - LEVEL OF INDEPENDENCE: GAIT, REHAB EVAL
trial 1: 150 feet no device modified I, trial 2: 150 feet RW modified I
trial 1: 100 feet no device distant S, trial 2: 100 feet RW modified I

## 2018-02-28 NOTE — PHYSICAL THERAPY INITIAL EVALUATION ADULT - DIAGNOSIS, PT EVAL
pt modified I c all functional mobility, will not follow
pt modified I c all functional mobility, will not follow

## 2018-02-28 NOTE — PROGRESS NOTE ADULT - SUBJECTIVE AND OBJECTIVE BOX
INTERVAL HPI/OVERNIGHT EVENTS: No acute events overnight.    SUBJECTIVE: No fevers, chills, chest pain, dyspnea. No n/v/d. +flatus/+BM. Ambulating. Adequate urine output. No pain.       MEDICATIONS  (STANDING):  acetaminophen   Tablet. 650 milliGRAM(s) Oral every 6 hours  aspirin  chewable 81 milliGRAM(s) Oral daily  carvedilol 6.25 milliGRAM(s) Oral every 12 hours  enoxaparin Injectable 40 milliGRAM(s) SubCutaneous daily  losartan 25 milliGRAM(s) Oral daily  multiple electrolytes Injection Type 1 1000 milliLiter(s) (60 mL/Hr) IV Continuous <Continuous>  pantoprazole    Tablet 40 milliGRAM(s) Oral before breakfast  potassium chloride    Tablet ER 40 milliEquivalent(s) Oral once    MEDICATIONS  (PRN):  ALPRAZolam 0.25 milliGRAM(s) Oral every 8 hours PRN Anxeity  ondansetron Injectable 4 milliGRAM(s) IV Push every 6 hours PRN Nausea      Vital Signs Last 24 Hrs  T(C): 36.6 (28 Feb 2018 15:30), Max: 36.8 (27 Feb 2018 20:43)  T(F): 97.9 (28 Feb 2018 15:30), Max: 98.3 (27 Feb 2018 20:43)  HR: 88 (28 Feb 2018 15:30) (79 - 97)  BP: 128/74 (28 Feb 2018 15:30) (95/55 - 128/74)  BP(mean): --  RR: 18 (28 Feb 2018 15:30) (16 - 18)  SpO2: 98% (28 Feb 2018 15:30) (95% - 98%)    Physical exam:  General: NAD, AOx3, resting comfortably in bed  HEENT: PERRLA, EOMI  Neck: supple, nontender  Respiratory: no respiratory distress, lungs CTAB  Heart: regular rate and rhythm, no murmurs  Abdomen: soft, nontender, nondistended. Normal bowel sounds. No guarding or rebound.  Extremities: no peripheral edema. Normal ROM.      I&O's Detail    27 Feb 2018 07:01  -  28 Feb 2018 07:00  --------------------------------------------------------  IN:    dextrose 5% + sodium chloride 0.45%.: 120 mL    multiple electrolytes Injection Type 1: 600 mL    Oral Fluid: 480 mL  Total IN: 1200 mL    OUT:    Voided: 300 mL  Total OUT: 300 mL    Total NET: 900 mL      28 Feb 2018 07:01  -  28 Feb 2018 17:50  --------------------------------------------------------  IN:    multiple electrolytes Injection Type 1: 540 mL    Oral Fluid: 120 mL  Total IN: 660 mL    OUT:  Total OUT: 0 mL    Total NET: 660 mL          LABS:                        9.3    6.2   )-----------( 279      ( 28 Feb 2018 06:54 )             28.7     02-28    137  |  101  |  20.0  ----------------------------<  97  3.7   |  24.0  |  0.66    Ca    8.8      28 Feb 2018 06:54  Phos  3.1     02-28  Mg     1.9     02-28            RADIOLOGY & ADDITIONAL STUDIES:    EXAM:  CT ABDOMEN AND PELVIS OC IC                          PROCEDURE DATE:  02/27/2018          INTERPRETATION:  Clinical information: Not tolerating diet c emesis.   Status post reversal of ileostomy 5 days ago.    Comparison: 1/18    PROCEDURE:   CT of the Abdomen and Pelvis was performed with intravenous contrast.  90ml of Omnipaque 350 was injected intravenously. 10cc was discarded.    FINDINGS:    LOWER CHEST: Within normal limits    ABDOMEN:  LIVER: Multiple subcentimeter hypodensities  BILE DUCTS: Normal caliber  GALLBLADDER: Cholecystectomy  PANCREAS: Within normal limits  SPLEEN: Within normal limits  ADRENALS: Within normal limits  KIDNEYS: Subcentimeter hypodensities    PELVIS:  REPRODUCTIVE ORGANS: No pelvic masses  BLADDER: Within normal limits    PERITONEUM:  Large pneumoperitoneum, new from prior. Right lower   quadrant, perihepatic and pelvic ascites, new from prior. Rectosigmoid   anastomosis.  BOWEL: Status post ileostomy reversal. Right pelvic and right lower   quadrant small bowel anastomoses. Focal mesenteric gas and inflammatory   changes at the anterior right lower quadrant anastomosis best on 2:107.   Distended proximal small bowel, likely reactive ileus rather than partial   obstruction. Enteric contrast in the right colon.  VESSELS: Within normal limits  RETROPERITONEUM: No retroperitoneal or pelvic adenopathy  ABDOMINAL WALL: Within normal limits  MUSCULOSKELETAL: Prior ileostomy site with packing.    IMPRESSION:     Persistent moderate to large pneumoperitoneum. Small volume right   abdominal and pelvic ascites. Observation at the anterior right abdominal   small bowel anastomosis, correlate for anastomotic breakdown.    Distended proximal small bowel. The differential include reactive ileus   and partial small bowel obstruction.    Other incidental findings as above.    Findings were discussed with Physician: ESHA ALDANA [id number] with a   read back at 8:45 PM.

## 2018-02-28 NOTE — CHART NOTE - NSCHARTNOTEFT_GEN_A_CORE
Patient seen and examined.  Comfortably asleep but arousable.  Denies N/V/abdominal pain.  Abdomen: soft, NT, ND.

## 2018-02-28 NOTE — PHYSICAL THERAPY INITIAL EVALUATION ADULT - GENERAL OBSERVATIONS, REHAB EVAL
pt received sitting in chair at bedside, NAD, agreeable to PT
pt received sitting in chair at bedside, NAD, agreeable to PT

## 2018-03-01 LAB
ANION GAP SERPL CALC-SCNC: 11 MMOL/L — SIGNIFICANT CHANGE UP (ref 5–17)
BUN SERPL-MCNC: 18 MG/DL — SIGNIFICANT CHANGE UP (ref 8–20)
CALCIUM SERPL-MCNC: 8.6 MG/DL — SIGNIFICANT CHANGE UP (ref 8.6–10.2)
CHLORIDE SERPL-SCNC: 102 MMOL/L — SIGNIFICANT CHANGE UP (ref 98–107)
CO2 SERPL-SCNC: 25 MMOL/L — SIGNIFICANT CHANGE UP (ref 22–29)
CREAT SERPL-MCNC: 0.58 MG/DL — SIGNIFICANT CHANGE UP (ref 0.5–1.3)
GLUCOSE SERPL-MCNC: 91 MG/DL — SIGNIFICANT CHANGE UP (ref 70–115)
HCT VFR BLD CALC: 27.8 % — LOW (ref 37–47)
HGB BLD-MCNC: 8.7 G/DL — LOW (ref 12–16)
MAGNESIUM SERPL-MCNC: 2 MG/DL — SIGNIFICANT CHANGE UP (ref 1.6–2.6)
MCHC RBC-ENTMCNC: 28.7 PG — SIGNIFICANT CHANGE UP (ref 27–31)
MCHC RBC-ENTMCNC: 31.3 G/DL — LOW (ref 32–36)
MCV RBC AUTO: 91.7 FL — SIGNIFICANT CHANGE UP (ref 81–99)
PHOSPHATE SERPL-MCNC: 2.1 MG/DL — LOW (ref 2.4–4.7)
PLATELET # BLD AUTO: 304 K/UL — SIGNIFICANT CHANGE UP (ref 150–400)
POTASSIUM SERPL-MCNC: 3.4 MMOL/L — LOW (ref 3.5–5.3)
POTASSIUM SERPL-SCNC: 3.4 MMOL/L — LOW (ref 3.5–5.3)
RBC # BLD: 3.03 M/UL — LOW (ref 4.4–5.2)
RBC # FLD: 12.9 % — SIGNIFICANT CHANGE UP (ref 11–15.6)
SODIUM SERPL-SCNC: 138 MMOL/L — SIGNIFICANT CHANGE UP (ref 135–145)
WBC # BLD: 6.6 K/UL — SIGNIFICANT CHANGE UP (ref 4.8–10.8)
WBC # FLD AUTO: 6.6 K/UL — SIGNIFICANT CHANGE UP (ref 4.8–10.8)

## 2018-03-01 RX ORDER — POTASSIUM PHOSPHATE, MONOBASIC POTASSIUM PHOSPHATE, DIBASIC 236; 224 MG/ML; MG/ML
15 INJECTION, SOLUTION INTRAVENOUS ONCE
Qty: 0 | Refills: 0 | Status: DISCONTINUED | OUTPATIENT
Start: 2018-03-01 | End: 2018-03-01

## 2018-03-01 RX ORDER — POTASSIUM CHLORIDE 20 MEQ
40 PACKET (EA) ORAL EVERY 4 HOURS
Qty: 0 | Refills: 0 | Status: COMPLETED | OUTPATIENT
Start: 2018-03-01 | End: 2018-03-01

## 2018-03-01 RX ADMIN — Medication 100 MILLIGRAM(S): at 04:37

## 2018-03-01 RX ADMIN — SENNA PLUS 2 TABLET(S): 8.6 TABLET ORAL at 22:41

## 2018-03-01 RX ADMIN — SODIUM CHLORIDE 60 MILLILITER(S): 9 INJECTION, SOLUTION INTRAVENOUS at 04:38

## 2018-03-01 RX ADMIN — Medication 81 MILLIGRAM(S): at 11:36

## 2018-03-01 RX ADMIN — ENOXAPARIN SODIUM 40 MILLIGRAM(S): 100 INJECTION SUBCUTANEOUS at 11:36

## 2018-03-01 RX ADMIN — Medication 100 MILLIGRAM(S): at 22:41

## 2018-03-01 RX ADMIN — Medication 40 MILLIEQUIVALENT(S): at 13:56

## 2018-03-01 RX ADMIN — Medication 40 MILLIEQUIVALENT(S): at 11:35

## 2018-03-01 RX ADMIN — Medication 100 MILLIGRAM(S): at 13:56

## 2018-03-01 NOTE — CHART NOTE - NSCHARTNOTEFT_GEN_A_CORE
Patient seen and examined.  Comfortable in chair.  Walked independently yesterday several times.  +flatus and BM.  Tolerating clears.  wants solid food.  Afebrile.  HD normal.  WBC: 6.6.  Abdomen: S/NT/ND.  Healing well from ileostomy reversal.  May advance to regular diet.  Ambulate.  Dispo planning.

## 2018-03-01 NOTE — OCCUPATIONAL THERAPY INITIAL EVALUATION ADULT - ADDITIONAL COMMENTS
Pt lives in private home with 5 JENNIFRE and 5 JENNIFER up to bedroom/bathroom; bathroom has tub with door. Pt can also go 5 steps down after entering the front door to grandson's level and there is a shower stall with door. Pt does not own any DME. Pt is right handed. Pt drives.

## 2018-03-01 NOTE — PROGRESS NOTE ADULT - SUBJECTIVE AND OBJECTIVE BOX
INTERVAL HPI/OVERNIGHT EVENTS:  no issues overnight. no nausea no vomiting; no complaints of abdominal pain. passing flatus.    MEDICATIONS  (STANDING):  acetaminophen   Tablet. 650 milliGRAM(s) Oral every 6 hours  aspirin  chewable 81 milliGRAM(s) Oral daily  carvedilol 6.25 milliGRAM(s) Oral every 12 hours  docusate sodium 100 milliGRAM(s) Oral three times a day  enoxaparin Injectable 40 milliGRAM(s) SubCutaneous daily  losartan 25 milliGRAM(s) Oral daily  pantoprazole    Tablet 40 milliGRAM(s) Oral before breakfast  potassium chloride    Tablet ER 40 milliEquivalent(s) Oral once  potassium chloride    Tablet ER 40 milliEquivalent(s) Oral every 4 hours  senna 2 Tablet(s) Oral at bedtime    MEDICATIONS  (PRN):  ALPRAZolam 0.25 milliGRAM(s) Oral every 8 hours PRN Anxeity  ondansetron Injectable 4 milliGRAM(s) IV Push every 6 hours PRN Nausea      Vital Signs Last 24 Hrs  T(C): 37.2 (01 Mar 2018 08:45), Max: 37.2 (01 Mar 2018 08:45)  T(F): 98.9 (01 Mar 2018 08:45), Max: 98.9 (01 Mar 2018 08:45)  HR: 63 (01 Mar 2018 08:45) (63 - 88)  BP: 101/59 (01 Mar 2018 08:45) (91/57 - 128/74)  BP(mean): --  RR: 16 (01 Mar 2018 08:45) (16 - 18)  SpO2: 98% (01 Mar 2018 08:45) (95% - 100%)    PE  General: NAD, AOx3, resting comfortably in bed  HEENT: PERRLA, EOMI  Neck: supple, nontender  Respiratory: no respiratory distress, lungs CTAB  Heart: regular rate and rhythm, no murmurs  Abdomen: soft, nontender, nondistended. Normal bowel sounds. No guarding or rebound.      I&O's Detail    28 Feb 2018 07:01  -  01 Mar 2018 07:00  --------------------------------------------------------  IN:    multiple electrolytes Injection Type 1multiple electrolytes Injection Type 1: 1260 mL    Oral Fluid: 120 mL  Total IN: 1380 mL    OUT:  Total OUT: 0 mL    Total NET: 1380 mL          LABS:                        8.7    6.6   )-----------( 304      ( 01 Mar 2018 07:21 )             27.8     03-01    138  |  102  |  18.0  ----------------------------<  91  3.4<L>   |  25.0  |  0.58    Ca    8.6      01 Mar 2018 07:21  Phos  2.1     03-01  Mg     2.0     03-01            RADIOLOGY & ADDITIONAL STUDIES:

## 2018-03-02 LAB
ANION GAP SERPL CALC-SCNC: 9 MMOL/L — SIGNIFICANT CHANGE UP (ref 5–17)
BUN SERPL-MCNC: 16 MG/DL — SIGNIFICANT CHANGE UP (ref 8–20)
CALCIUM SERPL-MCNC: 8.7 MG/DL — SIGNIFICANT CHANGE UP (ref 8.6–10.2)
CHLORIDE SERPL-SCNC: 102 MMOL/L — SIGNIFICANT CHANGE UP (ref 98–107)
CO2 SERPL-SCNC: 26 MMOL/L — SIGNIFICANT CHANGE UP (ref 22–29)
CREAT SERPL-MCNC: 0.58 MG/DL — SIGNIFICANT CHANGE UP (ref 0.5–1.3)
GLUCOSE SERPL-MCNC: 101 MG/DL — SIGNIFICANT CHANGE UP (ref 70–115)
MAGNESIUM SERPL-MCNC: 1.9 MG/DL — SIGNIFICANT CHANGE UP (ref 1.6–2.6)
PHOSPHATE SERPL-MCNC: 1.6 MG/DL — LOW (ref 2.4–4.7)
POTASSIUM SERPL-MCNC: 3.7 MMOL/L — SIGNIFICANT CHANGE UP (ref 3.5–5.3)
POTASSIUM SERPL-SCNC: 3.7 MMOL/L — SIGNIFICANT CHANGE UP (ref 3.5–5.3)
SODIUM SERPL-SCNC: 137 MMOL/L — SIGNIFICANT CHANGE UP (ref 135–145)

## 2018-03-02 RX ORDER — DOCUSATE SODIUM 100 MG
1 CAPSULE ORAL
Qty: 0 | Refills: 0 | COMMUNITY
Start: 2018-03-02

## 2018-03-02 RX ORDER — POTASSIUM PHOSPHATE, MONOBASIC POTASSIUM PHOSPHATE, DIBASIC 236; 224 MG/ML; MG/ML
15 INJECTION, SOLUTION INTRAVENOUS ONCE
Qty: 0 | Refills: 0 | Status: COMPLETED | OUTPATIENT
Start: 2018-03-02 | End: 2018-03-02

## 2018-03-02 RX ORDER — SENNA PLUS 8.6 MG/1
2 TABLET ORAL
Qty: 0 | Refills: 0 | COMMUNITY
Start: 2018-03-02

## 2018-03-02 RX ADMIN — PANTOPRAZOLE SODIUM 40 MILLIGRAM(S): 20 TABLET, DELAYED RELEASE ORAL at 05:16

## 2018-03-02 RX ADMIN — Medication 81 MILLIGRAM(S): at 11:12

## 2018-03-02 RX ADMIN — ENOXAPARIN SODIUM 40 MILLIGRAM(S): 100 INJECTION SUBCUTANEOUS at 11:12

## 2018-03-02 RX ADMIN — POTASSIUM PHOSPHATE, MONOBASIC POTASSIUM PHOSPHATE, DIBASIC 62.5 MILLIMOLE(S): 236; 224 INJECTION, SOLUTION INTRAVENOUS at 17:05

## 2018-03-02 RX ADMIN — Medication 100 MILLIGRAM(S): at 05:16

## 2018-03-02 RX ADMIN — SENNA PLUS 2 TABLET(S): 8.6 TABLET ORAL at 21:54

## 2018-03-02 RX ADMIN — Medication 100 MILLIGRAM(S): at 11:12

## 2018-03-02 RX ADMIN — Medication 100 MILLIGRAM(S): at 21:54

## 2018-03-02 NOTE — PROGRESS NOTE ADULT - SUBJECTIVE AND OBJECTIVE BOX
Patient seen and examined at bedside, no acute events overnight. Patient doing well, tolerating diet, having BM. Pain well controlled. Denies fever, chills, nausea, vomiting, chest pain, sob, diarrhea, constipation.

## 2018-03-02 NOTE — PROGRESS NOTE ADULT - ASSESSMENT
77yoF s/p loop ileostomy reversal
76 y/o F POD 2 s/p loop ileostomy reversal. Hemodynamically stable, tolerating CLD, appropriately healing wound.    Plan:  -Advance to regular diet  -Pain control as needed  -Encourage OOB, ambulation, IS  -Continue home medications  -DVT ppx: lovenox  -Dispo: will set up [facility] for wound care at home upon discharge
76 y/o F POD 3 s/p loop ileostomy reversal. Hemodynamically stable, tolerating regular diet, appropriately healing wound.    Plan:  -Continue regular diet  -Pain control as needed  -Encourage OOB, ambulation, IS  -Continue home medications  -DVT ppx: lovenox  -Dispo: will set up [facility] for wound care at home upon discharge
76 y/o POD6 s/p ileostomy reversal. Hemodynamically stable, afebrile.     Plan:  -Advance to clear liquid diet, monitor for tolerance  -Continue pain control as needed  -Wound care  -Encourage OOB, ambulation, IS  -Continue home medications  -DVT ppx: lovenox
77F pod5 s/p ileostomy reversal. has been having emesis with no flatus yesterday. no complaints of abdominal pain.     Plan:  - CT a/p w/ IV/PO contrast  - cont pain control   - IS  - NPO/IVF  - Daily dressing changes  - Pain mgmt  - OOB and ambulate  - DVT ppx
77F s/p ileostomy reversal
77yoF pod#1 s/p loop ileostomy reversal. tolerated sips of water. no issues overnight. no complaints of abdominal pain; no bowel fucntion yet. no nausea/ no vomiting.      Plan  - likely clear liquid diet today/ cont IVF  - Pain mgmt  - IS  - cont home medication  - DVT ppx  - OOB and ambulate
78 y/o POD 8 s/p ileostomy reversal. Hemodynamically stable, afebrile. Stable for discharge.    Plan:  -Continue pain control as needed  -VNS set up  -Encourage OOB, ambulation, IS  -Continue home medications  -DVT ppx: lovenox  -discharge planning
78 y/o POD7 s/p ileostomy reversal. Hemodynamically stable, afebrile.  doing well.    Plan:  -Advance to reg diet  - bowel reg  -Continue pain control as needed  -Wound care  -Encourage OOB, ambulation, IS  -Continue home medications  -DVT ppx: lovenox

## 2018-03-03 VITALS
OXYGEN SATURATION: 99 % | RESPIRATION RATE: 18 BRPM | TEMPERATURE: 98 F | SYSTOLIC BLOOD PRESSURE: 110 MMHG | DIASTOLIC BLOOD PRESSURE: 61 MMHG

## 2018-03-03 PROCEDURE — 85027 COMPLETE CBC AUTOMATED: CPT

## 2018-03-03 PROCEDURE — 88304 TISSUE EXAM BY PATHOLOGIST: CPT

## 2018-03-03 PROCEDURE — 97167 OT EVAL HIGH COMPLEX 60 MIN: CPT

## 2018-03-03 PROCEDURE — 84100 ASSAY OF PHOSPHORUS: CPT

## 2018-03-03 PROCEDURE — 74018 RADEX ABDOMEN 1 VIEW: CPT

## 2018-03-03 PROCEDURE — 36415 COLL VENOUS BLD VENIPUNCTURE: CPT

## 2018-03-03 PROCEDURE — 74177 CT ABD & PELVIS W/CONTRAST: CPT

## 2018-03-03 PROCEDURE — 83735 ASSAY OF MAGNESIUM: CPT

## 2018-03-03 PROCEDURE — 80048 BASIC METABOLIC PNL TOTAL CA: CPT

## 2018-03-03 PROCEDURE — 97163 PT EVAL HIGH COMPLEX 45 MIN: CPT

## 2018-03-03 RX ADMIN — Medication 100 MILLIGRAM(S): at 05:10

## 2018-03-03 RX ADMIN — Medication 81 MILLIGRAM(S): at 11:34

## 2018-03-03 RX ADMIN — ENOXAPARIN SODIUM 40 MILLIGRAM(S): 100 INJECTION SUBCUTANEOUS at 11:34

## 2018-03-03 RX ADMIN — Medication 100 MILLIGRAM(S): at 11:34

## 2018-03-08 ENCOUNTER — APPOINTMENT (OUTPATIENT)
Dept: TRAUMA SURGERY | Facility: CLINIC | Age: 78
End: 2018-03-08
Payer: MEDICARE

## 2018-03-08 ENCOUNTER — APPOINTMENT (OUTPATIENT)
Dept: VASCULAR SURGERY | Facility: CLINIC | Age: 78
End: 2018-03-08
Payer: MEDICARE

## 2018-03-08 VITALS
WEIGHT: 136 LBS | OXYGEN SATURATION: 99 % | DIASTOLIC BLOOD PRESSURE: 73 MMHG | TEMPERATURE: 98 F | RESPIRATION RATE: 14 BRPM | HEIGHT: 62 IN | SYSTOLIC BLOOD PRESSURE: 130 MMHG | HEART RATE: 82 BPM | BODY MASS INDEX: 25.03 KG/M2

## 2018-03-08 PROCEDURE — 93970 EXTREMITY STUDY: CPT

## 2018-03-08 PROCEDURE — 99024 POSTOP FOLLOW-UP VISIT: CPT

## 2018-03-15 ENCOUNTER — APPOINTMENT (OUTPATIENT)
Dept: TRAUMA SURGERY | Facility: CLINIC | Age: 78
End: 2018-03-15
Payer: MEDICARE

## 2018-03-15 VITALS
TEMPERATURE: 98.2 F | HEART RATE: 69 BPM | SYSTOLIC BLOOD PRESSURE: 125 MMHG | RESPIRATION RATE: 14 BRPM | WEIGHT: 134 LBS | HEIGHT: 62 IN | OXYGEN SATURATION: 99 % | BODY MASS INDEX: 24.66 KG/M2 | DIASTOLIC BLOOD PRESSURE: 62 MMHG

## 2018-03-15 PROCEDURE — 99024 POSTOP FOLLOW-UP VISIT: CPT

## 2018-03-22 ENCOUNTER — APPOINTMENT (OUTPATIENT)
Dept: TRAUMA SURGERY | Facility: CLINIC | Age: 78
End: 2018-03-22
Payer: MEDICARE

## 2018-03-22 VITALS
TEMPERATURE: 98.1 F | WEIGHT: 134 LBS | SYSTOLIC BLOOD PRESSURE: 121 MMHG | OXYGEN SATURATION: 97 % | RESPIRATION RATE: 14 BRPM | HEART RATE: 80 BPM | HEIGHT: 62 IN | DIASTOLIC BLOOD PRESSURE: 66 MMHG | BODY MASS INDEX: 24.66 KG/M2

## 2018-03-22 PROCEDURE — 99024 POSTOP FOLLOW-UP VISIT: CPT

## 2018-04-03 ENCOUNTER — APPOINTMENT (OUTPATIENT)
Dept: TRAUMA SURGERY | Facility: CLINIC | Age: 78
End: 2018-04-03
Payer: MEDICARE

## 2018-04-03 VITALS
HEIGHT: 62 IN | HEART RATE: 70 BPM | TEMPERATURE: 97.4 F | WEIGHT: 132 LBS | DIASTOLIC BLOOD PRESSURE: 81 MMHG | BODY MASS INDEX: 24.29 KG/M2 | RESPIRATION RATE: 14 BRPM | OXYGEN SATURATION: 99 % | SYSTOLIC BLOOD PRESSURE: 137 MMHG

## 2018-04-03 PROCEDURE — 99024 POSTOP FOLLOW-UP VISIT: CPT

## 2018-04-17 ENCOUNTER — APPOINTMENT (OUTPATIENT)
Dept: TRAUMA SURGERY | Facility: CLINIC | Age: 78
End: 2018-04-17
Payer: MEDICARE

## 2018-04-17 VITALS
HEIGHT: 62 IN | HEART RATE: 69 BPM | BODY MASS INDEX: 24.29 KG/M2 | OXYGEN SATURATION: 100 % | SYSTOLIC BLOOD PRESSURE: 149 MMHG | WEIGHT: 132 LBS | RESPIRATION RATE: 14 BRPM | TEMPERATURE: 97.5 F | DIASTOLIC BLOOD PRESSURE: 75 MMHG

## 2018-04-17 PROCEDURE — 99024 POSTOP FOLLOW-UP VISIT: CPT

## 2018-06-17 ENCOUNTER — TRANSCRIPTION ENCOUNTER (OUTPATIENT)
Age: 78
End: 2018-06-17

## 2018-06-20 ENCOUNTER — EMERGENCY (EMERGENCY)
Facility: HOSPITAL | Age: 78
LOS: 1 days | Discharge: HOME | End: 2018-06-20
Attending: EMERGENCY MEDICINE
Payer: MEDICARE

## 2018-06-20 VITALS
DIASTOLIC BLOOD PRESSURE: 78 MMHG | OXYGEN SATURATION: 99 % | SYSTOLIC BLOOD PRESSURE: 152 MMHG | HEART RATE: 78 BPM | RESPIRATION RATE: 20 BRPM

## 2018-06-20 VITALS — HEIGHT: 65 IN | WEIGHT: 134.04 LBS

## 2018-06-20 DIAGNOSIS — Z93.2 ILEOSTOMY STATUS: Chronic | ICD-10-CM

## 2018-06-20 DIAGNOSIS — Z95.810 PRESENCE OF AUTOMATIC (IMPLANTABLE) CARDIAC DEFIBRILLATOR: Chronic | ICD-10-CM

## 2018-06-20 DIAGNOSIS — Z90.49 ACQUIRED ABSENCE OF OTHER SPECIFIED PARTS OF DIGESTIVE TRACT: Chronic | ICD-10-CM

## 2018-06-20 DIAGNOSIS — Z87.898 PERSONAL HISTORY OF OTHER SPECIFIED CONDITIONS: Chronic | ICD-10-CM

## 2018-06-20 DIAGNOSIS — Z98.890 OTHER SPECIFIED POSTPROCEDURAL STATES: Chronic | ICD-10-CM

## 2018-06-20 PROCEDURE — 72100 X-RAY EXAM L-S SPINE 2/3 VWS: CPT | Mod: 26

## 2018-06-20 PROCEDURE — 73521 X-RAY EXAM HIPS BI 2 VIEWS: CPT

## 2018-06-20 PROCEDURE — 73521 X-RAY EXAM HIPS BI 2 VIEWS: CPT | Mod: 26

## 2018-06-20 PROCEDURE — 72100 X-RAY EXAM L-S SPINE 2/3 VWS: CPT

## 2018-06-20 PROCEDURE — 99283 EMERGENCY DEPT VISIT LOW MDM: CPT

## 2018-06-20 PROCEDURE — 99284 EMERGENCY DEPT VISIT MOD MDM: CPT

## 2018-06-20 RX ORDER — IRBESARTAN 75 MG/1
0.5 TABLET ORAL
Qty: 0 | Refills: 0 | COMMUNITY

## 2018-06-20 RX ORDER — ACETAMINOPHEN 500 MG
650 TABLET ORAL ONCE
Qty: 0 | Refills: 0 | Status: COMPLETED | OUTPATIENT
Start: 2018-06-20 | End: 2018-06-20

## 2018-06-20 RX ADMIN — Medication 650 MILLIGRAM(S): at 10:47

## 2018-06-20 NOTE — ED PROVIDER NOTE - OBJECTIVE STATEMENT
77 yo F PT, PmHX: scoliosis, HTN, Breast CA, sciatica, presents to ED complaining of back pain x 2 weeks. PT states she has b/l back pain L>R, radiating down back of leg, with associated buttock pain, and pain on ambulation. Pt states her pain is similar to sciatica pain she has had several times in past. PT states she usually sees a chiropractor but has been unable to get appt. PT seen by PMD and  center, given naproxen/muscle relaxer with minimal relief of symptoms. PT denies numbness, tingling, bowel/urinary incontinence, fever, chills, N/V/D, recent trauma, abdominal pain, urinary symptoms, and any other acute symptoms.

## 2018-06-20 NOTE — ED PROVIDER NOTE - MEDICAL DECISION MAKING DETAILS
Buttock pain, most likely sciatica, will obtain xrays r/o possible bony pathology, medicate for symptoms, refer spine center for follow up.

## 2018-06-20 NOTE — ED PROVIDER NOTE - ATTENDING CONTRIBUTION TO CARE
seen with acp  c/o low back pain radiating to the buttocks  had similar peisodes in the past  PE tenderness lower back  x-ray shows degenerative changes  patient medicated for pain and improve  Agree with acps assessment hx and physical

## 2018-06-20 NOTE — ED ADULT NURSE NOTE - OBJECTIVE STATEMENT
78 year old female in no acute distress, alert and oriented x 4, c/o bilateral buttocks pain radiates to left thigh and worse to left side and radiates to bilateral shoulders at night x 2 weeks. Saw UC Sunday and was to start prednisone but reports allergy to cortisone. Pt reports PMD placed her on naprosyn with no relief and had pain mgmt for July 29th.

## 2018-06-20 NOTE — ED PROVIDER NOTE - CHPI ED SYMPTOMS NEG
no tingling/no bladder dysfunction/no neck tenderness/no numbness/no motor function loss/no difficulty bearing weight/no anorexia/no bowel dysfunction/no constipation/no fatigue

## 2018-06-20 NOTE — ED PROVIDER NOTE - MUSCULOSKELETAL MINIMAL EXAM
motor intact/atraumatic/no midline vertebral tenderness, kyphotic spine/SCOLIOSIS/neck supple/RANGE OF MOTION LIMITED

## 2018-06-20 NOTE — ED ADULT TRIAGE NOTE - CHIEF COMPLAINT QUOTE
pt states she has had difficulty walking times 2 weeks. pt denies injury and reports hx of sciatica. pain has moved from right buttock to left. pt states she has had some swelling to her ankles

## 2018-06-20 NOTE — ED PROVIDER NOTE - PROGRESS NOTE DETAILS
PT pain decreased with po medication. xray results discussed with PT. PT educated on pain relief with tylenol and heat/ice at home. referral to Spine center given to PT. PT verbalized understanding of diagnosis and importance of follow up at PMD. PT educated on importance of follow up and when to return to the ED.

## 2018-06-28 ENCOUNTER — APPOINTMENT (OUTPATIENT)
Dept: PHYSICAL MEDICINE AND REHAB | Facility: CLINIC | Age: 78
End: 2018-06-28
Payer: MEDICARE

## 2018-06-28 VITALS
WEIGHT: 132 LBS | BODY MASS INDEX: 24.29 KG/M2 | SYSTOLIC BLOOD PRESSURE: 116 MMHG | HEART RATE: 76 BPM | HEIGHT: 62 IN | DIASTOLIC BLOOD PRESSURE: 70 MMHG

## 2018-06-28 DIAGNOSIS — M47.816 SPONDYLOSIS W/OUT MYELOPATHY OR RADICULOPATHY, LUMBAR REGION: ICD-10-CM

## 2018-06-28 PROCEDURE — 99204 OFFICE O/P NEW MOD 45 MIN: CPT

## 2018-06-28 RX ORDER — ENOXAPARIN SODIUM 40 MG/.4ML
40 INJECTION SUBCUTANEOUS
Refills: 0 | Status: COMPLETED | COMMUNITY
End: 2018-06-28

## 2018-06-28 RX ORDER — DICLOFENAC SODIUM 75 MG/1
75 TABLET, DELAYED RELEASE ORAL
Qty: 30 | Refills: 0 | Status: ACTIVE | COMMUNITY
Start: 2018-06-28 | End: 1900-01-01

## 2018-06-29 ENCOUNTER — APPOINTMENT (OUTPATIENT)
Dept: ORTHOPEDIC SURGERY | Facility: CLINIC | Age: 78
End: 2018-06-29

## 2018-06-30 ENCOUNTER — INPATIENT (INPATIENT)
Facility: HOSPITAL | Age: 78
LOS: 4 days | Discharge: HOME | DRG: 392 | End: 2018-07-05
Attending: SURGERY | Admitting: SURGERY
Payer: MEDICARE

## 2018-06-30 VITALS — WEIGHT: 128.09 LBS | HEIGHT: 63 IN

## 2018-06-30 DIAGNOSIS — Z98.890 OTHER SPECIFIED POSTPROCEDURAL STATES: Chronic | ICD-10-CM

## 2018-06-30 DIAGNOSIS — Z95.810 PRESENCE OF AUTOMATIC (IMPLANTABLE) CARDIAC DEFIBRILLATOR: Chronic | ICD-10-CM

## 2018-06-30 DIAGNOSIS — Z90.49 ACQUIRED ABSENCE OF OTHER SPECIFIED PARTS OF DIGESTIVE TRACT: Chronic | ICD-10-CM

## 2018-06-30 DIAGNOSIS — Z87.898 PERSONAL HISTORY OF OTHER SPECIFIED CONDITIONS: Chronic | ICD-10-CM

## 2018-06-30 DIAGNOSIS — Z93.2 ILEOSTOMY STATUS: Chronic | ICD-10-CM

## 2018-06-30 LAB
ALBUMIN SERPL ELPH-MCNC: 3.8 G/DL — SIGNIFICANT CHANGE UP (ref 3.3–5.2)
ALP SERPL-CCNC: 210 U/L — HIGH (ref 40–120)
ALT FLD-CCNC: 18 U/L — SIGNIFICANT CHANGE UP
ANION GAP SERPL CALC-SCNC: 12 MMOL/L — SIGNIFICANT CHANGE UP (ref 5–17)
APTT BLD: 30.5 SEC — SIGNIFICANT CHANGE UP (ref 27.5–37.4)
AST SERPL-CCNC: 28 U/L — SIGNIFICANT CHANGE UP
BASOPHILS # BLD AUTO: 0 K/UL — SIGNIFICANT CHANGE UP (ref 0–0.2)
BASOPHILS NFR BLD AUTO: 0.1 % — SIGNIFICANT CHANGE UP (ref 0–2)
BILIRUB SERPL-MCNC: 0.4 MG/DL — SIGNIFICANT CHANGE UP (ref 0.4–2)
BUN SERPL-MCNC: 19 MG/DL — SIGNIFICANT CHANGE UP (ref 8–20)
CALCIUM SERPL-MCNC: 9.6 MG/DL — SIGNIFICANT CHANGE UP (ref 8.6–10.2)
CHLORIDE SERPL-SCNC: 97 MMOL/L — LOW (ref 98–107)
CO2 SERPL-SCNC: 29 MMOL/L — SIGNIFICANT CHANGE UP (ref 22–29)
CREAT SERPL-MCNC: 0.65 MG/DL — SIGNIFICANT CHANGE UP (ref 0.5–1.3)
EOSINOPHIL # BLD AUTO: 0 K/UL — SIGNIFICANT CHANGE UP (ref 0–0.5)
EOSINOPHIL NFR BLD AUTO: 0.5 % — SIGNIFICANT CHANGE UP (ref 0–6)
GLUCOSE SERPL-MCNC: 128 MG/DL — HIGH (ref 70–115)
HCT VFR BLD CALC: 38.8 % — SIGNIFICANT CHANGE UP (ref 37–47)
HGB BLD-MCNC: 12.9 G/DL — SIGNIFICANT CHANGE UP (ref 12–16)
INR BLD: 1.04 RATIO — SIGNIFICANT CHANGE UP (ref 0.88–1.16)
LACTATE BLDV-MCNC: 0.8 MMOL/L — SIGNIFICANT CHANGE UP (ref 0.5–2)
LIDOCAIN IGE QN: 13 U/L — LOW (ref 22–51)
LYMPHOCYTES # BLD AUTO: 1 K/UL — SIGNIFICANT CHANGE UP (ref 1–4.8)
LYMPHOCYTES # BLD AUTO: 9.1 % — LOW (ref 20–55)
MAGNESIUM SERPL-MCNC: 2.3 MG/DL — SIGNIFICANT CHANGE UP (ref 1.6–2.6)
MCHC RBC-ENTMCNC: 29.7 PG — SIGNIFICANT CHANGE UP (ref 27–31)
MCHC RBC-ENTMCNC: 33.2 G/DL — SIGNIFICANT CHANGE UP (ref 32–36)
MCV RBC AUTO: 89.4 FL — SIGNIFICANT CHANGE UP (ref 81–99)
MONOCYTES # BLD AUTO: 0.5 K/UL — SIGNIFICANT CHANGE UP (ref 0–0.8)
MONOCYTES NFR BLD AUTO: 4.6 % — SIGNIFICANT CHANGE UP (ref 3–10)
NEUTROPHILS # BLD AUTO: 9 K/UL — HIGH (ref 1.8–8)
NEUTROPHILS NFR BLD AUTO: 85.5 % — HIGH (ref 37–73)
PLATELET # BLD AUTO: 222 K/UL — SIGNIFICANT CHANGE UP (ref 150–400)
POTASSIUM SERPL-MCNC: 4.4 MMOL/L — SIGNIFICANT CHANGE UP (ref 3.5–5.3)
POTASSIUM SERPL-SCNC: 4.4 MMOL/L — SIGNIFICANT CHANGE UP (ref 3.5–5.3)
PROT SERPL-MCNC: 7.1 G/DL — SIGNIFICANT CHANGE UP (ref 6.6–8.7)
PROTHROM AB SERPL-ACNC: 11.4 SEC — SIGNIFICANT CHANGE UP (ref 9.8–12.7)
RBC # BLD: 4.34 M/UL — LOW (ref 4.4–5.2)
RBC # FLD: 13.4 % — SIGNIFICANT CHANGE UP (ref 11–15.6)
SODIUM SERPL-SCNC: 138 MMOL/L — SIGNIFICANT CHANGE UP (ref 135–145)
TROPONIN T SERPL-MCNC: 0.02 NG/ML — SIGNIFICANT CHANGE UP (ref 0–0.06)
TSH SERPL-MCNC: 1.42 UIU/ML — SIGNIFICANT CHANGE UP (ref 0.27–4.2)
WBC # BLD: 10.5 K/UL — SIGNIFICANT CHANGE UP (ref 4.8–10.8)
WBC # FLD AUTO: 10.5 K/UL — SIGNIFICANT CHANGE UP (ref 4.8–10.8)

## 2018-06-30 PROCEDURE — 99285 EMERGENCY DEPT VISIT HI MDM: CPT

## 2018-06-30 RX ORDER — SODIUM CHLORIDE 9 MG/ML
1000 INJECTION INTRAMUSCULAR; INTRAVENOUS; SUBCUTANEOUS
Qty: 0 | Refills: 0 | Status: DISCONTINUED | OUTPATIENT
Start: 2018-06-30 | End: 2018-07-01

## 2018-06-30 RX ORDER — METOCLOPRAMIDE HCL 10 MG
10 TABLET ORAL ONCE
Qty: 0 | Refills: 0 | Status: COMPLETED | OUTPATIENT
Start: 2018-06-30 | End: 2018-06-30

## 2018-06-30 RX ADMIN — Medication 104 MILLIGRAM(S): at 22:24

## 2018-06-30 RX ADMIN — SODIUM CHLORIDE 100 MILLILITER(S): 9 INJECTION INTRAMUSCULAR; INTRAVENOUS; SUBCUTANEOUS at 22:24

## 2018-06-30 NOTE — ED PROVIDER NOTE - PROGRESS NOTE DETAILS
plan to check labs, antiemetics, will call surgery to check pt, if ct is negative will disimpact pt at that time

## 2018-06-30 NOTE — ED PROVIDER NOTE - OBJECTIVE STATEMENT
This is a 78 year old with hx of HTN, ileostomy (1 year ago reversal February 2018), osteoporosis, scoliosis presenting to the ED c/o nausea/vomiting (brownish color) and abdominal pain that began today. Pt reports associated constipation, last BM 3 days ago. Pt passing gas normally. Last meal 1800 today, however vomited soon after. Similar episode of sx occurred 1 year ago. Denies fever, chills, chest pain, shortness of breath, headache, cough, cold, congestion, urinary sx. Pt mentions chronic back pain secondary to sciatica. Allergic to IV dye.

## 2018-06-30 NOTE — ED PROVIDER NOTE - MEDICAL DECISION MAKING DETAILS
79 y/o F with hx of multiple abdominal surgeries presents with constipation, plan to do CT labs and re-evaluate

## 2018-06-30 NOTE — ED PROVIDER NOTE - NS ED ROS FT
no weight change, no fever or chills  no recent travel, no recent abox, no sick contacts  no recent change in medications  no rash, no bruises  no visual changes no eye discharge  no cough cold or congestion,   no sob, no chest pain  no orthopnea, no pnd  +abd pain, +n/v +constipation  no hematuria, no change in urinary habits  no joint pain, no deformity  no headache, no paresthesia

## 2018-06-30 NOTE — ED PROVIDER NOTE - PHYSICAL EXAMINATION
Constitutional: Appears uncomfortable, talking in full sentences  Head: NC/AT, no swelling  Eyes: EOMI, no swelling  Mouth: mm dry  Neck: supple, trachea is midline  Chest: kyphotic, Bilateral air entry, decreased breath sounds  Heart: S1 S2 distant  Abdomen: bowel sounds present, multiple surgical scars to abdomen, RUQ + RLQ firm, L side abd soft, non-tender  Musc/Skel: +2 pitting edema to bilateral lower extremities with varicose veins, no spine tenderness, distal pulses present  Neuro: A&Ox3, no focal deficits

## 2018-07-01 ENCOUNTER — TRANSCRIPTION ENCOUNTER (OUTPATIENT)
Age: 78
End: 2018-07-01

## 2018-07-01 DIAGNOSIS — K56.609 UNSPECIFIED INTESTINAL OBSTRUCTION, UNSPECIFIED AS TO PARTIAL VERSUS COMPLETE OBSTRUCTION: ICD-10-CM

## 2018-07-01 PROBLEM — M47.816 LUMBAR SPONDYLOSIS: Status: ACTIVE | Noted: 2018-07-01

## 2018-07-01 LAB
ANION GAP SERPL CALC-SCNC: 9 MMOL/L — SIGNIFICANT CHANGE UP (ref 5–17)
APPEARANCE UR: CLEAR — SIGNIFICANT CHANGE UP
BILIRUB UR-MCNC: NEGATIVE — SIGNIFICANT CHANGE UP
BUN SERPL-MCNC: 17 MG/DL — SIGNIFICANT CHANGE UP (ref 8–20)
CALCIUM SERPL-MCNC: 9.2 MG/DL — SIGNIFICANT CHANGE UP (ref 8.6–10.2)
CHLORIDE SERPL-SCNC: 100 MMOL/L — SIGNIFICANT CHANGE UP (ref 98–107)
CO2 SERPL-SCNC: 30 MMOL/L — HIGH (ref 22–29)
COLOR SPEC: YELLOW — SIGNIFICANT CHANGE UP
CREAT SERPL-MCNC: 0.6 MG/DL — SIGNIFICANT CHANGE UP (ref 0.5–1.3)
DIFF PNL FLD: ABNORMAL
EOSINOPHIL # BLD AUTO: 0 K/UL — SIGNIFICANT CHANGE UP (ref 0–0.5)
EOSINOPHIL NFR BLD AUTO: 0.1 % — SIGNIFICANT CHANGE UP (ref 0–6)
EPI CELLS # UR: SIGNIFICANT CHANGE UP
GLUCOSE SERPL-MCNC: 123 MG/DL — HIGH (ref 70–115)
GLUCOSE UR QL: NEGATIVE MG/DL — SIGNIFICANT CHANGE UP
HCT VFR BLD CALC: 39.3 % — SIGNIFICANT CHANGE UP (ref 37–47)
HGB BLD-MCNC: 12.8 G/DL — SIGNIFICANT CHANGE UP (ref 12–16)
KETONES UR-MCNC: ABNORMAL
LEUKOCYTE ESTERASE UR-ACNC: ABNORMAL
LYMPHOCYTES # BLD AUTO: 0.8 K/UL — LOW (ref 1–4.8)
LYMPHOCYTES # BLD AUTO: 10.2 % — LOW (ref 20–55)
MAGNESIUM SERPL-MCNC: 2.1 MG/DL — SIGNIFICANT CHANGE UP (ref 1.6–2.6)
MCHC RBC-ENTMCNC: 29.6 PG — SIGNIFICANT CHANGE UP (ref 27–31)
MCHC RBC-ENTMCNC: 32.6 G/DL — SIGNIFICANT CHANGE UP (ref 32–36)
MCV RBC AUTO: 90.8 FL — SIGNIFICANT CHANGE UP (ref 81–99)
MONOCYTES # BLD AUTO: 0.5 K/UL — SIGNIFICANT CHANGE UP (ref 0–0.8)
MONOCYTES NFR BLD AUTO: 5.8 % — SIGNIFICANT CHANGE UP (ref 3–10)
NEUTROPHILS # BLD AUTO: 6.8 K/UL — SIGNIFICANT CHANGE UP (ref 1.8–8)
NEUTROPHILS NFR BLD AUTO: 83.8 % — HIGH (ref 37–73)
NITRITE UR-MCNC: NEGATIVE — SIGNIFICANT CHANGE UP
PH UR: 7 — SIGNIFICANT CHANGE UP (ref 5–8)
PHOSPHATE SERPL-MCNC: 2.3 MG/DL — LOW (ref 2.4–4.7)
PLATELET # BLD AUTO: 206 K/UL — SIGNIFICANT CHANGE UP (ref 150–400)
POTASSIUM SERPL-MCNC: 4.8 MMOL/L — SIGNIFICANT CHANGE UP (ref 3.5–5.3)
POTASSIUM SERPL-SCNC: 4.8 MMOL/L — SIGNIFICANT CHANGE UP (ref 3.5–5.3)
PROT UR-MCNC: NEGATIVE MG/DL — SIGNIFICANT CHANGE UP
RBC # BLD: 4.33 M/UL — LOW (ref 4.4–5.2)
RBC # FLD: 13.6 % — SIGNIFICANT CHANGE UP (ref 11–15.6)
RBC CASTS # UR COMP ASSIST: ABNORMAL /HPF (ref 0–4)
SODIUM SERPL-SCNC: 139 MMOL/L — SIGNIFICANT CHANGE UP (ref 135–145)
SP GR SPEC: 1.01 — SIGNIFICANT CHANGE UP (ref 1.01–1.02)
UROBILINOGEN FLD QL: NEGATIVE MG/DL — SIGNIFICANT CHANGE UP
WBC # BLD: 8.1 K/UL — SIGNIFICANT CHANGE UP (ref 4.8–10.8)
WBC # FLD AUTO: 8.1 K/UL — SIGNIFICANT CHANGE UP (ref 4.8–10.8)
WBC UR QL: SIGNIFICANT CHANGE UP

## 2018-07-01 PROCEDURE — 72128 CT CHEST SPINE W/O DYE: CPT | Mod: 26

## 2018-07-01 PROCEDURE — 93010 ELECTROCARDIOGRAM REPORT: CPT

## 2018-07-01 PROCEDURE — 74176 CT ABD & PELVIS W/O CONTRAST: CPT | Mod: 26

## 2018-07-01 RX ORDER — CARVEDILOL PHOSPHATE 80 MG/1
6.25 CAPSULE, EXTENDED RELEASE ORAL EVERY 12 HOURS
Qty: 0 | Refills: 0 | Status: DISCONTINUED | OUTPATIENT
Start: 2018-07-01 | End: 2018-07-05

## 2018-07-01 RX ORDER — ONDANSETRON 8 MG/1
4 TABLET, FILM COATED ORAL ONCE
Qty: 0 | Refills: 0 | Status: COMPLETED | OUTPATIENT
Start: 2018-07-01 | End: 2018-07-01

## 2018-07-01 RX ORDER — ENOXAPARIN SODIUM 100 MG/ML
40 INJECTION SUBCUTANEOUS DAILY
Qty: 0 | Refills: 0 | Status: DISCONTINUED | OUTPATIENT
Start: 2018-07-01 | End: 2018-07-05

## 2018-07-01 RX ORDER — ALPRAZOLAM 0.25 MG
0.25 TABLET ORAL THREE TIMES A DAY
Qty: 0 | Refills: 0 | Status: DISCONTINUED | OUTPATIENT
Start: 2018-07-01 | End: 2018-07-05

## 2018-07-01 RX ORDER — FAMOTIDINE 10 MG/ML
20 INJECTION INTRAVENOUS EVERY 12 HOURS
Qty: 0 | Refills: 0 | Status: DISCONTINUED | OUTPATIENT
Start: 2018-07-01 | End: 2018-07-05

## 2018-07-01 RX ORDER — LACTULOSE 10 G/15ML
20 SOLUTION ORAL ONCE
Qty: 0 | Refills: 0 | Status: DISCONTINUED | OUTPATIENT
Start: 2018-07-01 | End: 2018-07-01

## 2018-07-01 RX ORDER — SENNA PLUS 8.6 MG/1
2 TABLET ORAL AT BEDTIME
Qty: 0 | Refills: 0 | Status: DISCONTINUED | OUTPATIENT
Start: 2018-07-01 | End: 2018-07-05

## 2018-07-01 RX ORDER — ASPIRIN/CALCIUM CARB/MAGNESIUM 324 MG
81 TABLET ORAL DAILY
Qty: 0 | Refills: 0 | Status: DISCONTINUED | OUTPATIENT
Start: 2018-07-01 | End: 2018-07-05

## 2018-07-01 RX ORDER — DOCUSATE SODIUM 100 MG
100 CAPSULE ORAL THREE TIMES A DAY
Qty: 0 | Refills: 0 | Status: DISCONTINUED | OUTPATIENT
Start: 2018-07-01 | End: 2018-07-05

## 2018-07-01 RX ORDER — ACETAMINOPHEN 500 MG
650 TABLET ORAL EVERY 6 HOURS
Qty: 0 | Refills: 0 | Status: DISCONTINUED | OUTPATIENT
Start: 2018-07-01 | End: 2018-07-05

## 2018-07-01 RX ORDER — LOSARTAN POTASSIUM 100 MG/1
25 TABLET, FILM COATED ORAL DAILY
Qty: 0 | Refills: 0 | Status: DISCONTINUED | OUTPATIENT
Start: 2018-07-01 | End: 2018-07-05

## 2018-07-01 RX ORDER — ONDANSETRON 8 MG/1
4 TABLET, FILM COATED ORAL EVERY 6 HOURS
Qty: 0 | Refills: 0 | Status: DISCONTINUED | OUTPATIENT
Start: 2018-07-01 | End: 2018-07-05

## 2018-07-01 RX ORDER — SODIUM CHLORIDE 9 MG/ML
1000 INJECTION, SOLUTION INTRAVENOUS
Qty: 0 | Refills: 0 | Status: DISCONTINUED | OUTPATIENT
Start: 2018-07-01 | End: 2018-07-03

## 2018-07-01 RX ORDER — LACTULOSE 10 G/15ML
20 SOLUTION ORAL ONCE
Qty: 0 | Refills: 0 | Status: COMPLETED | OUTPATIENT
Start: 2018-07-01 | End: 2018-07-01

## 2018-07-01 RX ADMIN — SENNA PLUS 2 TABLET(S): 8.6 TABLET ORAL at 21:22

## 2018-07-01 RX ADMIN — ONDANSETRON 4 MILLIGRAM(S): 8 TABLET, FILM COATED ORAL at 03:34

## 2018-07-01 RX ADMIN — SODIUM CHLORIDE 125 MILLILITER(S): 9 INJECTION, SOLUTION INTRAVENOUS at 21:22

## 2018-07-01 RX ADMIN — SODIUM CHLORIDE 100 MILLILITER(S): 9 INJECTION INTRAMUSCULAR; INTRAVENOUS; SUBCUTANEOUS at 03:34

## 2018-07-01 RX ADMIN — Medication 100 MILLIGRAM(S): at 21:22

## 2018-07-01 RX ADMIN — ENOXAPARIN SODIUM 40 MILLIGRAM(S): 100 INJECTION SUBCUTANEOUS at 13:46

## 2018-07-01 RX ADMIN — SODIUM CHLORIDE 125 MILLILITER(S): 9 INJECTION, SOLUTION INTRAVENOUS at 13:46

## 2018-07-01 RX ADMIN — FAMOTIDINE 20 MILLIGRAM(S): 10 INJECTION INTRAVENOUS at 18:59

## 2018-07-01 RX ADMIN — CARVEDILOL PHOSPHATE 6.25 MILLIGRAM(S): 80 CAPSULE, EXTENDED RELEASE ORAL at 17:47

## 2018-07-01 RX ADMIN — Medication 1 ENEMA: at 17:09

## 2018-07-01 NOTE — CONSULT NOTE ADULT - SUBJECTIVE AND OBJECTIVE BOX
Pt Name: BUD GUERRA    MRN: [id number]      Ortho consulted to see patient for T9 fx found incidentally on CT. Patient is admitted to [facility] for SBO. Patient denies back pain or recent fall. Denies acute motor/sensory changes. No orthopedic complaints at this time.        REVIEW OF SYSTEMS      General:	denies fever/chills    Respiratory and Thorax: denies SOB  	  Cardiovascular:	denies CP    Musculoskeletal:	 denies back pain    Neurological:	denies numbness/tingling      ROS is otherwise negative.    PAST MEDICAL & SURGICAL HISTORY:  PAST MEDICAL & SURGICAL HISTORY:  Cardiomyopathy  Breast cancer  Valvular heart disease  HTN (hypertension)  S/P lumpectomy, right breast  S/P appendectomy  H/O bilateral oophorectomy  S/P ileostomy  AICD (automatic cardioverter/defibrillator) present  H/O lymphadenopathy      Allergies: Cipro (Rash)  codeine (Faint)  IV Contrast (Rash)  Keflex (Rash)      Medications: acetaminophen   Tablet. 650 milliGRAM(s) Oral every 6 hours PRN  ALPRAZolam 0.25 milliGRAM(s) Oral three times a day PRN  aspirin enteric coated 81 milliGRAM(s) Oral daily  carvedilol 6.25 milliGRAM(s) Oral every 12 hours  docusate sodium 100 milliGRAM(s) Oral three times a day  enoxaparin Injectable 40 milliGRAM(s) SubCutaneous daily  famotidine Injectable 20 milliGRAM(s) IV Push every 12 hours  lactated ringers. 1000 milliLiter(s) IV Continuous <Continuous>  losartan 25 milliGRAM(s) Oral daily  ondansetron Injectable 4 milliGRAM(s) IV Push every 6 hours PRN  senna 2 Tablet(s) Oral at bedtime      FAMILY HISTORY:  No pertinent family history in first degree relatives  : non-contributory    Social History:     denies illicit drug use                          12.9   10.5  )-----------( 222      ( 30 Jun 2018 22:26 )             38.8     06-30    138  |  97<L>  |  19.0  ----------------------------<  128<H>  4.4   |  29.0  |  0.65    Ca    9.6      30 Jun 2018 22:26  Mg     2.3     06-30    TPro  7.1  /  Alb  3.8  /  TBili  0.4  /  DBili  x   /  AST  28  /  ALT  18  /  AlkPhos  210<H>  06-30      PHYSICAL EXAM:    Vital Signs Last 24 Hrs  T(C): 36.7 (01 Jul 2018 08:29), Max: 36.7 (30 Jun 2018 20:20)  T(F): 98 (01 Jul 2018 08:29), Max: 98.1 (30 Jun 2018 20:20)  HR: 95 (01 Jul 2018 08:29) (95 - 99)  BP: 126/73 (01 Jul 2018 08:29) (123/75 - 126/73)  BP(mean): --  RR: 18 (01 Jul 2018 08:29) (17 - 18)  SpO2: 95% (01 Jul 2018 08:29) (95% - 97%)  Daily Height in cm: 160.02 (30 Jun 2018 20:10)    Daily     Appearance: Alert, responsive, in no acute distress.    Neurological: Sensation is grossly intact to light touch. 5/5 motor function of all extremities. No focal deficits or weaknesses found.    Skin: no rash on visible skin. Skin is clean, dry and intact. No bleeding. No abrasions. No ulcerations.    Vascular: 2+ distal pulses. Cap refill < 2 sec. No signs of venous insuffiency or stasis. No extremity ulcerations. No cyanosis.    Back: skin in tact. no erythema. No TTP to thoracic or lumbosacral spine. No TTP paraspinal muscles  UE: SILT B/L.  strength equal and in tact B/L. + FROM phalanges and thumb B/L. able to flex at elbow and shrug shoulders B/L.  LE: SILT B/L. + strong dorsi/plantarflexion against resistance B/L. able to SLR against resistance B/L. DP 2+ B/L. Calf soft NT B/L.    Imaging Studies:    < from: CT Abdomen and Pelvis w/ Oral Cont (07.01.18 @ 01:39) >  ******PRELIMINARY REPORT******    ******PRELIMINARY REPORT******           EXAM:  CT ABDOMEN AND PELVIS OC                          PROCEDURE DATE:  07/01/2018        ******PRELIMINARY REPORT******    ******PRELIMINARY REPORT******          INTERPRETATION:  [facility] RADIOLOGIST PRELIMINARY REPORT    EXAM:    CT Abdomen and Pelvis Without Intravenous Contrast    EXAM DATE/TIME:    7/1/2018 12:25 AM    CLINICAL HISTORY:    78 years old, female; Pain; Abdominal pain    TECHNIQUE:    Axial computed tomography images of the abdomen and pelvis without   intravenous contrast.    Coronal and sagittal reformatted images were created and reviewed.    COMPARISON:    CT ABDOMEN AND PELVIS 2017-07-25 17:12 and a CT abdomen and pelvis   02/27/2018    FINDINGS:    Lung bases:  There is subsegmental atelectasis versus scarring.    Heart:  Cardiomegaly with pacemaker.    Mediastinum:  Small hiatal hernia.     ABDOMEN:    Liver:  There are scattered subcentimeter hepatic hypodensities that   are too   small to characterize could represent cysts.    Gallbladder and bile ducts:  Cholecystectomy.  There is a mild,   expected   degree of bile duct dilation post-cholecystectomy.    Pancreas:  Unremarkable.  No ductal dilation.    Spleen:  Unremarkable.  No splenomegaly.    Adrenals:  Adrenal glands are prominent suggestive of hyperplasia.    Kidneys and ureters:  Nonspecific bilateral perinephric fat stranding   can be   chronic but can be seen in the setting of pyelonephritis. Clinical   correlation   isrecommended.  No obstructive uropathy.    Stomach and bowel:  Colonic diverticulosis. Partial sigmoid resection.   Large   amount of stool in the colon.  Stomach distended. There has been partial   bowel   resection with distended small bowel loops with air fluid levels with a   transition seen near the more distal anastomosis in the right lower   quadrant.   The more proximal anastomosis is dilated with contrast and stool-like   contents   probably reservoir effect.  No mucosal thickening.     PELVIS:    Appendix:  No findings to suggest acute appendicitis.    Bladder:  Unremarkable.  No stones.    Reproductive:  Unremarkable as visualized.     ABDOMEN and PELVIS:    Intraperitoneal space:   No free air.  No significant fluid collection.    Bones/joints:  No dislocation.Bones osteopenic with degenerative   changes. New   T9 compression fracture possibly acute or subacute in age.    Soft tissues:  There is minimal interloop edema.    Vasculature:  Calcified plaque the aorta.  No abdominalaortic aneurysm.    Lymph nodes:  Unremarkable.  No enlarged lymph nodes.    IMPRESSION:         Distal small bowel obstruction possibly due to stricture or adhesion   near the   more distal small bowel anastomosis in the right lower posterior. There  is also   suggestion of colonic constipation. There is minimal reactive edema   without   free air or abscess.      New T9 compression fracture possibly acute or subacute in age. Clinical   correlation is recommended.      Other findings as described above.    < end of copied text >      A/P:  Pt is a  78y Female with T9 fx    PLAN:   D/W Dr. Soliman  patient unable to have MRI due to defibrillator  TLSO ordered  f/u outpatient with Dr. Soliman  [facility] primary team Pt Name: BUD GUERRA    MRN: [id number]      Ortho consulted to see patient for T9 fx found incidentally on CT. Patient is admitted to [facility] for SBO. Patient denies back pain or recent fall. Denies acute motor/sensory changes. No orthopedic complaints at this time.        REVIEW OF SYSTEMS      General:	denies fever/chills    Respiratory and Thorax: denies SOB  	  Cardiovascular:	denies CP    Musculoskeletal:	 denies back pain    Neurological:	denies numbness/tingling      ROS is otherwise negative.    PAST MEDICAL & SURGICAL HISTORY:  PAST MEDICAL & SURGICAL HISTORY:  Cardiomyopathy  Breast cancer  Valvular heart disease  HTN (hypertension)  S/P lumpectomy, right breast  S/P appendectomy  H/O bilateral oophorectomy  S/P ileostomy  AICD (automatic cardioverter/defibrillator) present  H/O lymphadenopathy      Allergies: Cipro (Rash)  codeine (Faint)  IV Contrast (Rash)  Keflex (Rash)      Medications: acetaminophen   Tablet. 650 milliGRAM(s) Oral every 6 hours PRN  ALPRAZolam 0.25 milliGRAM(s) Oral three times a day PRN  aspirin enteric coated 81 milliGRAM(s) Oral daily  carvedilol 6.25 milliGRAM(s) Oral every 12 hours  docusate sodium 100 milliGRAM(s) Oral three times a day  enoxaparin Injectable 40 milliGRAM(s) SubCutaneous daily  famotidine Injectable 20 milliGRAM(s) IV Push every 12 hours  lactated ringers. 1000 milliLiter(s) IV Continuous <Continuous>  losartan 25 milliGRAM(s) Oral daily  ondansetron Injectable 4 milliGRAM(s) IV Push every 6 hours PRN  senna 2 Tablet(s) Oral at bedtime      FAMILY HISTORY:  No pertinent family history in first degree relatives  : non-contributory    Social History:     denies illicit drug use                          12.9   10.5  )-----------( 222      ( 30 Jun 2018 22:26 )             38.8     06-30    138  |  97<L>  |  19.0  ----------------------------<  128<H>  4.4   |  29.0  |  0.65    Ca    9.6      30 Jun 2018 22:26  Mg     2.3     06-30    TPro  7.1  /  Alb  3.8  /  TBili  0.4  /  DBili  x   /  AST  28  /  ALT  18  /  AlkPhos  210<H>  06-30      PHYSICAL EXAM:    Vital Signs Last 24 Hrs  T(C): 36.7 (01 Jul 2018 08:29), Max: 36.7 (30 Jun 2018 20:20)  T(F): 98 (01 Jul 2018 08:29), Max: 98.1 (30 Jun 2018 20:20)  HR: 95 (01 Jul 2018 08:29) (95 - 99)  BP: 126/73 (01 Jul 2018 08:29) (123/75 - 126/73)  BP(mean): --  RR: 18 (01 Jul 2018 08:29) (17 - 18)  SpO2: 95% (01 Jul 2018 08:29) (95% - 97%)  Daily Height in cm: 160.02 (30 Jun 2018 20:10)    Daily     Appearance: Alert, responsive, in no acute distress.    Neurological: Sensation is grossly intact to light touch. 5/5 motor function of all extremities. No focal deficits or weaknesses found.    Skin: no rash on visible skin. Skin is clean, dry and intact. No bleeding. No abrasions. No ulcerations.    Vascular: 2+ distal pulses. Cap refill < 2 sec. No signs of venous insuffiency or stasis. No extremity ulcerations. No cyanosis.    Back: skin in tact. no erythema. No TTP to thoracic or lumbosacral spine. No TTP paraspinal muscles  UE: SILT B/L.  strength equal and in tact B/L. + FROM phalanges and thumb B/L. able to flex at elbow and shrug shoulders B/L.  LE: SILT B/L. + strong dorsi/plantarflexion against resistance B/L. able to SLR against resistance B/L. DP 2+ B/L. Calf soft NT B/L.    Imaging Studies:    < from: CT Abdomen and Pelvis w/ Oral Cont (07.01.18 @ 01:39) >  ******PRELIMINARY REPORT******    ******PRELIMINARY REPORT******           EXAM:  CT ABDOMEN AND PELVIS OC                          PROCEDURE DATE:  07/01/2018        ******PRELIMINARY REPORT******    ******PRELIMINARY REPORT******          INTERPRETATION:  [facility] RADIOLOGIST PRELIMINARY REPORT    EXAM:    CT Abdomen and Pelvis Without Intravenous Contrast    EXAM DATE/TIME:    7/1/2018 12:25 AM    CLINICAL HISTORY:    78 years old, female; Pain; Abdominal pain    TECHNIQUE:    Axial computed tomography images of the abdomen and pelvis without   intravenous contrast.    Coronal and sagittal reformatted images were created and reviewed.    COMPARISON:    CT ABDOMEN AND PELVIS 2017-07-25 17:12 and a CT abdomen and pelvis   02/27/2018    FINDINGS:    Lung bases:  There is subsegmental atelectasis versus scarring.    Heart:  Cardiomegaly with pacemaker.    Mediastinum:  Small hiatal hernia.     ABDOMEN:    Liver:  There are scattered subcentimeter hepatic hypodensities that   are too   small to characterize could represent cysts.    Gallbladder and bile ducts:  Cholecystectomy.  There is a mild,   expected   degree of bile duct dilation post-cholecystectomy.    Pancreas:  Unremarkable.  No ductal dilation.    Spleen:  Unremarkable.  No splenomegaly.    Adrenals:  Adrenal glands are prominent suggestive of hyperplasia.    Kidneys and ureters:  Nonspecific bilateral perinephric fat stranding   can be   chronic but can be seen in the setting of pyelonephritis. Clinical   correlation   isrecommended.  No obstructive uropathy.    Stomach and bowel:  Colonic diverticulosis. Partial sigmoid resection.   Large   amount of stool in the colon.  Stomach distended. There has been partial   bowel   resection with distended small bowel loops with air fluid levels with a   transition seen near the more distal anastomosis in the right lower   quadrant.   The more proximal anastomosis is dilated with contrast and stool-like   contents   probably reservoir effect.  No mucosal thickening.     PELVIS:    Appendix:  No findings to suggest acute appendicitis.    Bladder:  Unremarkable.  No stones.    Reproductive:  Unremarkable as visualized.     ABDOMEN and PELVIS:    Intraperitoneal space:   No free air.  No significant fluid collection.    Bones/joints:  No dislocation.Bones osteopenic with degenerative   changes. New   T9 compression fracture possibly acute or subacute in age.    Soft tissues:  There is minimal interloop edema.    Vasculature:  Calcified plaque the aorta.  No abdominalaortic aneurysm.    Lymph nodes:  Unremarkable.  No enlarged lymph nodes.    IMPRESSION:         Distal small bowel obstruction possibly due to stricture or adhesion   near the   more distal small bowel anastomosis in the right lower posterior. There  is also   suggestion of colonic constipation. There is minimal reactive edema   without   free air or abscess.      New T9 compression fracture possibly acute or subacute in age. Clinical   correlation is recommended.      Other findings as described above.    < end of copied text >      A/P:  Pt is a  78y Female with T9 fx    PLAN:   D/W Dr. Soliman  patient unable to have MRI due to defibrillator  TLSO ordered  f/u dedicated T spine CT  f/u outpatient with Dr. Soliman  [facility] primary team

## 2018-07-01 NOTE — DISCHARGE NOTE ADULT - ADDITIONAL INSTRUCTIONS
Ortho recommendations:  Follow-up with Dr. Soliman 1-2 weeks. Continue TLSO when out of bed. If increasing pain, redness, or swelling occur, call office immediately.

## 2018-07-01 NOTE — H&P ADULT - ATTENDING COMMENTS
Obstipation with large colonic stool burden. Admit, rectal disimpaction, aggressive bowel regimen. NGT if patient vomits - currently declines NGT placement and denies nausea.

## 2018-07-01 NOTE — ED ADULT NURSE NOTE - OBJECTIVE STATEMENT
pt arrived with abd pain nausea, vomiting for several hours, pt states also unable to have a bm for the past 3 days, pt with history of ile stomy  that was reversed, pt denies fever, denies chills, pt states feels like her abd is distented

## 2018-07-01 NOTE — DISCHARGE NOTE ADULT - CARE PROVIDER_API CALL
Cesar Soliman (DO), Orthopaedic Surgery  [address] [place] [address]  Phone: [phone]  Fax: [phone]

## 2018-07-01 NOTE — H&P ADULT - ASSESSMENT
77 y/o F h/o HTN, recent loop ileostomy reversal with clinical signs consistent with small bowel obstruction secondary to colonic constipation. Hemodynamically stable, afebrile, no leukocytosis.     Plan:  -Admit to Surgery, Dr. Ortiz  -Patient currently declining NGT placement; will recommend that if patient vomits again, NGT should be placed  -NPO/IVF  -Monitor vitals, I/Os  -Continue home medications  -Zofran as needed for nausea  -Encourage OOB, ambulation, IS  -DVT ppx  -Discussed with senior resident Dr. Zhou and attending surgeon Dr. Ortiz

## 2018-07-01 NOTE — DISCHARGE NOTE ADULT - MEDICATION SUMMARY - MEDICATIONS TO TAKE
I will START or STAY ON the medications listed below when I get home from the hospital:    acetaminophen 325 mg oral tablet  -- 2 tab(s) by mouth every 6 hours, As needed, Mild Pain (1 - 3)  -- Indication: For thoracic fracture     Aspirin Enteric Coated 81 mg oral delayed release tablet  -- 1 tab(s) by mouth once a day  -- Indication: For htn    naproxen  -- Indication: For home medication     losartan 25 mg oral tablet  -- 1 tab(s) by mouth once a day  -- Indication: For HTN    Xanax 0.25 mg oral tablet  -- 1 tab(s) by mouth 3 times a day, As Needed - for anxiety  -- Indication: For anxiety     Coreg 6.25 mg oral tablet  -- 1 tab(s) by mouth 2 times a day  -- Indication: For HTN    docusate sodium 100 mg oral capsule  -- 1 cap(s) by mouth 3 times a day  -- Indication: For constipation     Senna 8.6 mg oral tablet  -- 2 tab(s) by mouth once a day (at bedtime)  -- Indication: For constipation     Restasis 0.05% ophthalmic emulsion  -- 1 drop(s) to each affected eye every 12 hours  -- Indication: For dry eyes I will START or STAY ON the medications listed below when I get home from the hospital:    rolling walker  -- Indication: For Thoracic vertebral fracture    acetaminophen 325 mg oral tablet  -- 2 tab(s) by mouth every 6 hours, As needed, Mild Pain (1 - 3)  -- Indication: For Thoracic vertebral fracture    Aspirin Enteric Coated 81 mg oral delayed release tablet  -- 1 tab(s) by mouth once a day  -- Indication: For HTN (hypertension)    naproxen  -- Indication: For Thoracic vertebral fracture    losartan 25 mg oral tablet  -- 1 tab(s) by mouth once a day  -- Indication: For HTN (hypertension)    Xanax 0.25 mg oral tablet  -- 1 tab(s) by mouth 3 times a day, As Needed - for anxiety  -- Indication: For anxiety     Coreg 6.25 mg oral tablet  -- 1 tab(s) by mouth 2 times a day  -- Indication: For HTN (hypertension)    docusate sodium 100 mg oral capsule  -- 1 cap(s) by mouth 3 times a day  -- Indication: For constipation     Senna 8.6 mg oral tablet  -- 2 tab(s) by mouth once a day (at bedtime)  -- Indication: For constipation     Restasis 0.05% ophthalmic emulsion  -- 1 drop(s) to each affected eye every 12 hours  -- Indication: For dry eyes

## 2018-07-01 NOTE — ED ADULT NURSE REASSESSMENT NOTE - NS ED NURSE REASSESS COMMENT FT1
surgical team at pt bedside, POC discussed with pt regarding NGT placement, pt refused, Dr. Randolph reports pt can have placed on unit or at desire, will continue to monitor pt tolerance.  Pt reports comfort at this time.
pt care assumed at 2000, no apparent distress noted at this time, charting as noted. pt received Alert and Oriented to person, place, situation and time sitting in bed comfortably. pt denies any complaints at this time, Pt has full ROm and FLORES. IV site is clean and intact, infusing well. vital signs remain stable. pt educated on plan of care, pt able to successfully teach back plan of care to RN, RN will continue to reeducate pt during hospital stay.
Patient received awake and alert x4 in cart.  Patient was vomiting at 0745, patient then aggred to have NG tube placed.  Patient has NG to intermittent low suction to right nostril.  Patient has no labored breathing, patient is in no acute distress.

## 2018-07-01 NOTE — DISCHARGE NOTE ADULT - CARE PLAN
Principal Discharge DX:	Thoracic vertebral fracture  Goal:	to be pain free and return to daily activities of living  Assessment and plan of treatment:	Follow-up with Dr. Soliman 1-2 weeks. Continue TLSO when out of bed. If increasing pain, redness, or swelling occur, call office immediately. Principal Discharge DX:	Thoracic vertebral fracture  Goal:	to be pain free and return to daily activities of living  Assessment and plan of treatment:	Follow-up with Dr. Soliman 1-2 weeks. Continue TLSO when out of bed. If increasing pain, redness, or swelling occur, call office immediately.  Secondary Diagnosis:	SBO (small bowel obstruction)  Assessment and plan of treatment:	You had small obstruction / ileus that resolved if any of your symptoms should return please come back to the emergency room as soon as possible, please make an appointment with the acute care surgery outpatient clinic in 2 weeks of discharge.  Secondary Diagnosis:	HTN (hypertension)  Assessment and plan of treatment:	Please continue all medications as previously prescribed by your prescribing doctor and make an appointment with them in 1 week of discharge to inform them of your recent admission to the hospital

## 2018-07-01 NOTE — DISCHARGE NOTE ADULT - HOSPITAL COURSE
77 y/o F h/o HTN, recent loop ileostomy reversal with abdominal pain, nausea, and vomiting since yesterday morning. Patient states she has not had a bowel movement in 5 days. She was recently prescribed narcotic pain medication for sciatic nerve pain by PCP, and believes this may be the cause of her constipation. No dysuria. No fever, chills, chest pain, dyspnea. 79 y/o F h/o HTN, recent loop ileostomy reversal with abdominal pain, nausea, and vomiting since yesterday morning. Patient states she has not had a bowel movement in 5 days. She was recently prescribed narcotic pain medication for sciatic nerve pain by PCP, and believes this may be the cause of her constipation. No dysuria. No fever, chills, chest pain, dyspnea. Patient had a ct scan of the abdomen that showed Small bowel obstruction to the level of a distal ileal anastomosis. Differential includes anastomotic stricture versus adhesion. There is a large volume of liquid stool in the colon to the level of the rectum. Patient was complaining of back pain and a thoracic ct was performed and showed Acute/subacute moderate T9 compression fracture. Patient was admitted to the acute care surgery service for sbo and compression fracture. An ng tube was placed, patient NPO, IV hydration, serial abdominal exams. Ortho spine called , fracture deemed non-op, tlso when out of bed , F/U as an outpatient, no mri can be done because of aicd. Patient output of ng began slowing down, abdominal exams improved, bowel function returned, ng removed, diet advanced with tolerance. Patient seen by PT who deemed appropriate for home with home pt and rolling walker. Patient will be discharged home today 7/5 with rolling walker, continuing on all home medications and f/u with PMD, ortho spine and acs.

## 2018-07-01 NOTE — H&P ADULT - HISTORY OF PRESENT ILLNESS
77 y/o F h/o HTN, recent loop ileostomy reversal with abdominal pain, nausea, and vomiting since yesterday morning. Patient states she has not had a bowel movement in 5 days. She was recently prescribed narcotic pain medication for sciatic nerve pain by PCP, and believes this may be the cause of her constipation. No dysuria. No fever, chills, chest pain, dyspnea.

## 2018-07-01 NOTE — DISCHARGE NOTE ADULT - PLAN OF CARE
to be pain free and return to daily activities of living Follow-up with Dr. Soliman 1-2 weeks. Continue TLSO when out of bed. If increasing pain, redness, or swelling occur, call office immediately. You had small obstruction / ileus that resolved if any of your symptoms should return please come back to the emergency room as soon as possible, please make an appointment with the acute care surgery outpatient clinic in 2 weeks of discharge. Please continue all medications as previously prescribed by your prescribing doctor and make an appointment with them in 1 week of discharge to inform them of your recent admission to the hospital

## 2018-07-02 LAB
ANION GAP SERPL CALC-SCNC: 13 MMOL/L — SIGNIFICANT CHANGE UP (ref 5–17)
BUN SERPL-MCNC: 17 MG/DL — SIGNIFICANT CHANGE UP (ref 8–20)
CALCIUM SERPL-MCNC: 8.8 MG/DL — SIGNIFICANT CHANGE UP (ref 8.6–10.2)
CHLORIDE SERPL-SCNC: 102 MMOL/L — SIGNIFICANT CHANGE UP (ref 98–107)
CO2 SERPL-SCNC: 25 MMOL/L — SIGNIFICANT CHANGE UP (ref 22–29)
CREAT SERPL-MCNC: 0.55 MG/DL — SIGNIFICANT CHANGE UP (ref 0.5–1.3)
GLUCOSE SERPL-MCNC: 95 MG/DL — SIGNIFICANT CHANGE UP (ref 70–115)
HCT VFR BLD CALC: 34.5 % — LOW (ref 37–47)
HGB BLD-MCNC: 11 G/DL — LOW (ref 12–16)
MAGNESIUM SERPL-MCNC: 1.9 MG/DL — SIGNIFICANT CHANGE UP (ref 1.6–2.6)
MCHC RBC-ENTMCNC: 29 PG — SIGNIFICANT CHANGE UP (ref 27–31)
MCHC RBC-ENTMCNC: 31.9 G/DL — LOW (ref 32–36)
MCV RBC AUTO: 91 FL — SIGNIFICANT CHANGE UP (ref 81–99)
PHOSPHATE SERPL-MCNC: 1.4 MG/DL — LOW (ref 2.4–4.7)
PLATELET # BLD AUTO: 187 K/UL — SIGNIFICANT CHANGE UP (ref 150–400)
POTASSIUM SERPL-MCNC: 4.2 MMOL/L — SIGNIFICANT CHANGE UP (ref 3.5–5.3)
POTASSIUM SERPL-SCNC: 4.2 MMOL/L — SIGNIFICANT CHANGE UP (ref 3.5–5.3)
RBC # BLD: 3.79 M/UL — LOW (ref 4.4–5.2)
RBC # FLD: 14 % — SIGNIFICANT CHANGE UP (ref 11–15.6)
SODIUM SERPL-SCNC: 140 MMOL/L — SIGNIFICANT CHANGE UP (ref 135–145)
WBC # BLD: 5.9 K/UL — SIGNIFICANT CHANGE UP (ref 4.8–10.8)
WBC # FLD AUTO: 5.9 K/UL — SIGNIFICANT CHANGE UP (ref 4.8–10.8)

## 2018-07-02 RX ADMIN — CARVEDILOL PHOSPHATE 6.25 MILLIGRAM(S): 80 CAPSULE, EXTENDED RELEASE ORAL at 05:01

## 2018-07-02 RX ADMIN — Medication 100 MILLIGRAM(S): at 19:20

## 2018-07-02 RX ADMIN — LOSARTAN POTASSIUM 25 MILLIGRAM(S): 100 TABLET, FILM COATED ORAL at 05:01

## 2018-07-02 RX ADMIN — Medication 85 MILLIMOLE(S): at 11:41

## 2018-07-02 RX ADMIN — Medication 100 MILLIGRAM(S): at 05:01

## 2018-07-02 RX ADMIN — SODIUM CHLORIDE 125 MILLILITER(S): 9 INJECTION, SOLUTION INTRAVENOUS at 20:36

## 2018-07-02 RX ADMIN — CARVEDILOL PHOSPHATE 6.25 MILLIGRAM(S): 80 CAPSULE, EXTENDED RELEASE ORAL at 19:21

## 2018-07-02 RX ADMIN — SENNA PLUS 2 TABLET(S): 8.6 TABLET ORAL at 20:36

## 2018-07-02 RX ADMIN — FAMOTIDINE 20 MILLIGRAM(S): 10 INJECTION INTRAVENOUS at 05:01

## 2018-07-02 RX ADMIN — ENOXAPARIN SODIUM 40 MILLIGRAM(S): 100 INJECTION SUBCUTANEOUS at 11:42

## 2018-07-02 RX ADMIN — Medication 100 MILLIGRAM(S): at 20:36

## 2018-07-02 RX ADMIN — FAMOTIDINE 20 MILLIGRAM(S): 10 INJECTION INTRAVENOUS at 19:21

## 2018-07-02 RX ADMIN — Medication 81 MILLIGRAM(S): at 11:41

## 2018-07-02 NOTE — PROGRESS NOTE ADULT - ASSESSMENT
Assessment and Plan    78 year old female recent loop ileostomy reversal. Patient states she has not had a bowel movement in 6 days. No dysuria. No fever, chills, chest pain, dyspnea. Patient was rectally disimpacted aprox a fistful of stool. Patient Unable to have MRI due to defibrillator and dedicated T spine CT redemonstrated T9 compression fracture. TLSO brace ordered. The NGT had no output. Hemodynamically stable, afebrile, no leukocytosis    Plan:  - Monitor NGT output  - NPO/IVF  - Monitor vitals, I/Os  - Continue home medications  - Zofran as needed for nausea  - Encourage OOB, ambulation, IS  - DVT ppx  - Serial Abdominal Exams

## 2018-07-03 LAB
ANION GAP SERPL CALC-SCNC: 11 MMOL/L — SIGNIFICANT CHANGE UP (ref 5–17)
BASOPHILS # BLD AUTO: 0 K/UL — SIGNIFICANT CHANGE UP (ref 0–0.2)
BASOPHILS NFR BLD AUTO: 0.2 % — SIGNIFICANT CHANGE UP (ref 0–2)
BUN SERPL-MCNC: 12 MG/DL — SIGNIFICANT CHANGE UP (ref 8–20)
CALCIUM SERPL-MCNC: 8.6 MG/DL — SIGNIFICANT CHANGE UP (ref 8.6–10.2)
CHLORIDE SERPL-SCNC: 101 MMOL/L — SIGNIFICANT CHANGE UP (ref 98–107)
CO2 SERPL-SCNC: 26 MMOL/L — SIGNIFICANT CHANGE UP (ref 22–29)
CREAT SERPL-MCNC: 0.48 MG/DL — LOW (ref 0.5–1.3)
EOSINOPHIL # BLD AUTO: 0.1 K/UL — SIGNIFICANT CHANGE UP (ref 0–0.5)
EOSINOPHIL NFR BLD AUTO: 2.2 % — SIGNIFICANT CHANGE UP (ref 0–6)
GLUCOSE SERPL-MCNC: 89 MG/DL — SIGNIFICANT CHANGE UP (ref 70–115)
HCT VFR BLD CALC: 32.8 % — LOW (ref 37–47)
HGB BLD-MCNC: 10.6 G/DL — LOW (ref 12–16)
LYMPHOCYTES # BLD AUTO: 1 K/UL — SIGNIFICANT CHANGE UP (ref 1–4.8)
LYMPHOCYTES # BLD AUTO: 16.6 % — LOW (ref 20–55)
MAGNESIUM SERPL-MCNC: 1.7 MG/DL — SIGNIFICANT CHANGE UP (ref 1.6–2.6)
MCHC RBC-ENTMCNC: 28.7 PG — SIGNIFICANT CHANGE UP (ref 27–31)
MCHC RBC-ENTMCNC: 32.3 G/DL — SIGNIFICANT CHANGE UP (ref 32–36)
MCV RBC AUTO: 88.9 FL — SIGNIFICANT CHANGE UP (ref 81–99)
MONOCYTES # BLD AUTO: 0.5 K/UL — SIGNIFICANT CHANGE UP (ref 0–0.8)
MONOCYTES NFR BLD AUTO: 8.5 % — SIGNIFICANT CHANGE UP (ref 3–10)
NEUTROPHILS # BLD AUTO: 4.3 K/UL — SIGNIFICANT CHANGE UP (ref 1.8–8)
NEUTROPHILS NFR BLD AUTO: 72.5 % — SIGNIFICANT CHANGE UP (ref 37–73)
PHOSPHATE SERPL-MCNC: 1.6 MG/DL — LOW (ref 2.4–4.7)
PLATELET # BLD AUTO: 166 K/UL — SIGNIFICANT CHANGE UP (ref 150–400)
POTASSIUM SERPL-MCNC: 3.2 MMOL/L — LOW (ref 3.5–5.3)
POTASSIUM SERPL-SCNC: 3.2 MMOL/L — LOW (ref 3.5–5.3)
RBC # BLD: 3.69 M/UL — LOW (ref 4.4–5.2)
RBC # FLD: 13.4 % — SIGNIFICANT CHANGE UP (ref 11–15.6)
SODIUM SERPL-SCNC: 138 MMOL/L — SIGNIFICANT CHANGE UP (ref 135–145)
WBC # BLD: 5.9 K/UL — SIGNIFICANT CHANGE UP (ref 4.8–10.8)
WBC # FLD AUTO: 5.9 K/UL — SIGNIFICANT CHANGE UP (ref 4.8–10.8)

## 2018-07-03 RX ORDER — POTASSIUM CHLORIDE 20 MEQ
40 PACKET (EA) ORAL ONCE
Qty: 0 | Refills: 0 | Status: COMPLETED | OUTPATIENT
Start: 2018-07-03 | End: 2018-07-03

## 2018-07-03 RX ORDER — POTASSIUM PHOSPHATE, MONOBASIC POTASSIUM PHOSPHATE, DIBASIC 236; 224 MG/ML; MG/ML
30 INJECTION, SOLUTION INTRAVENOUS ONCE
Qty: 0 | Refills: 0 | Status: COMPLETED | OUTPATIENT
Start: 2018-07-03 | End: 2018-07-03

## 2018-07-03 RX ORDER — MAGNESIUM SULFATE 500 MG/ML
2 VIAL (ML) INJECTION ONCE
Qty: 0 | Refills: 0 | Status: COMPLETED | OUTPATIENT
Start: 2018-07-03 | End: 2018-07-03

## 2018-07-03 RX ADMIN — Medication 0.25 MILLIGRAM(S): at 21:35

## 2018-07-03 RX ADMIN — Medication 100 MILLIGRAM(S): at 05:00

## 2018-07-03 RX ADMIN — Medication 81 MILLIGRAM(S): at 13:14

## 2018-07-03 RX ADMIN — ENOXAPARIN SODIUM 40 MILLIGRAM(S): 100 INJECTION SUBCUTANEOUS at 13:14

## 2018-07-03 RX ADMIN — Medication 50 GRAM(S): at 16:59

## 2018-07-03 RX ADMIN — CARVEDILOL PHOSPHATE 6.25 MILLIGRAM(S): 80 CAPSULE, EXTENDED RELEASE ORAL at 17:29

## 2018-07-03 RX ADMIN — CARVEDILOL PHOSPHATE 6.25 MILLIGRAM(S): 80 CAPSULE, EXTENDED RELEASE ORAL at 05:01

## 2018-07-03 RX ADMIN — SENNA PLUS 2 TABLET(S): 8.6 TABLET ORAL at 21:23

## 2018-07-03 RX ADMIN — LOSARTAN POTASSIUM 25 MILLIGRAM(S): 100 TABLET, FILM COATED ORAL at 05:00

## 2018-07-03 RX ADMIN — FAMOTIDINE 20 MILLIGRAM(S): 10 INJECTION INTRAVENOUS at 05:01

## 2018-07-03 RX ADMIN — POTASSIUM PHOSPHATE, MONOBASIC POTASSIUM PHOSPHATE, DIBASIC 85 MILLIMOLE(S): 236; 224 INJECTION, SOLUTION INTRAVENOUS at 18:34

## 2018-07-03 RX ADMIN — FAMOTIDINE 20 MILLIGRAM(S): 10 INJECTION INTRAVENOUS at 18:39

## 2018-07-03 RX ADMIN — Medication 100 MILLIGRAM(S): at 13:14

## 2018-07-03 RX ADMIN — Medication 40 MILLIEQUIVALENT(S): at 17:29

## 2018-07-03 RX ADMIN — Medication 100 MILLIGRAM(S): at 21:23

## 2018-07-03 NOTE — PHYSICAL THERAPY INITIAL EVALUATION ADULT - CRITERIA FOR SKILLED THERAPEUTIC INTERVENTIONS
risk reduction/prevention/anticipated equipment needs at discharge/impairments found/functional limitations in following categories/rehab potential/therapy frequency/anticipated discharge recommendation

## 2018-07-03 NOTE — PROGRESS NOTE ADULT - ASSESSMENT
Assessment and Plan    78 year old female recent loop ileostomy reversal. Patient states she has not had a bowel movement in 6 days. No dysuria. No fever, chills, chest pain, dyspnea. Patient was rectally disimpacted aprox a fistful of stool. Patient Unable to have MRI due to defibrillator and dedicated T spine CT redemonstrated T9 compression fracture. TLSO brace at bedside    Plan:  - NGT removed  - Start Clear liquid diet  - Monitor bowel function  - Monitor vitals, I/Os  - Continue home medications  - Zofran as needed for nausea  - Encourage OOB, ambulation, IS  - DVT ppx

## 2018-07-03 NOTE — PROGRESS NOTE ADULT - SUBJECTIVE AND OBJECTIVE BOX
INTERVAL HPI/OVERNIGHT EVENTS:    Patient seen and examined. No events overnight.  NGT with light bilious output.  Having BM's after tap water enemas yesterday  Afebrile      MEDICATIONS  (STANDING):  aspirin enteric coated 81 milliGRAM(s) Oral daily  carvedilol 6.25 milliGRAM(s) Oral every 12 hours  docusate sodium 100 milliGRAM(s) Oral three times a day  enoxaparin Injectable 40 milliGRAM(s) SubCutaneous daily  famotidine Injectable 20 milliGRAM(s) IV Push every 12 hours  losartan 25 milliGRAM(s) Oral daily  senna 2 Tablet(s) Oral at bedtime    MEDICATIONS  (PRN):  acetaminophen   Tablet. 650 milliGRAM(s) Oral every 6 hours PRN Mild Pain (1 - 3)  ALPRAZolam 0.25 milliGRAM(s) Oral three times a day PRN for anxiety  ondansetron Injectable 4 milliGRAM(s) IV Push every 6 hours PRN Nausea and/or Vomiting      Vital Signs Last 24 Hrs  T(C): 37.2 (03 Jul 2018 08:04), Max: 37.7 (03 Jul 2018 00:15)  T(F): 98.9 (03 Jul 2018 08:04), Max: 99.8 (03 Jul 2018 00:15)  HR: 76 (03 Jul 2018 08:04) (76 - 102)  BP: 121/66 (03 Jul 2018 08:04) (121/66 - 145/74)  BP(mean): --  RR: 19 (03 Jul 2018 08:04) (18 - 19)  SpO2: 94% (03 Jul 2018 00:15) (94% - 94%)    Physical Exam:    Gen: Well-developed, well nourished, AAO x3  Pulm: CTAB, normal breath sounds  CV: RRR, no murmur  Abd: soft, mild tenderness and mild distention, no guarding, no rebound tenderness  Ext: no cyanosis, clubbing or edema  Vasc: warm and well perfused    I&O's Detail    02 Jul 2018 07:01  -  03 Jul 2018 07:00  --------------------------------------------------------  IN:    lactated ringers.: 3000 mL  Total IN: 3000 mL    OUT:  Total OUT: 0 mL    Total NET: 3000 mL      03 Jul 2018 07:01  -  03 Jul 2018 12:42  --------------------------------------------------------  IN:    lactated ringers.: 250 mL  Total IN: 250 mL    OUT:  Total OUT: 0 mL    Total NET: 250 mL          LABS:                        10.6   5.9   )-----------( 166      ( 03 Jul 2018 08:10 )             32.8     07-03    138  |  101  |  12.0  ----------------------------<  89  3.2<L>   |  26.0  |  0.48<L>    Ca    8.6      03 Jul 2018 08:10  Phos  1.6     07-03  Mg     1.7     07-03            RADIOLOGY & ADDITIONAL STUDIES:

## 2018-07-04 LAB
ANION GAP SERPL CALC-SCNC: 9 MMOL/L — SIGNIFICANT CHANGE UP (ref 5–17)
BUN SERPL-MCNC: 10 MG/DL — SIGNIFICANT CHANGE UP (ref 8–20)
CALCIUM SERPL-MCNC: 8.7 MG/DL — SIGNIFICANT CHANGE UP (ref 8.6–10.2)
CHLORIDE SERPL-SCNC: 105 MMOL/L — SIGNIFICANT CHANGE UP (ref 98–107)
CO2 SERPL-SCNC: 28 MMOL/L — SIGNIFICANT CHANGE UP (ref 22–29)
CREAT SERPL-MCNC: 0.56 MG/DL — SIGNIFICANT CHANGE UP (ref 0.5–1.3)
GLUCOSE SERPL-MCNC: 109 MG/DL — SIGNIFICANT CHANGE UP (ref 70–115)
MAGNESIUM SERPL-MCNC: 2.1 MG/DL — SIGNIFICANT CHANGE UP (ref 1.6–2.6)
PHOSPHATE SERPL-MCNC: 1.7 MG/DL — LOW (ref 2.4–4.7)
POTASSIUM SERPL-MCNC: 4.2 MMOL/L — SIGNIFICANT CHANGE UP (ref 3.5–5.3)
POTASSIUM SERPL-SCNC: 4.2 MMOL/L — SIGNIFICANT CHANGE UP (ref 3.5–5.3)
SODIUM SERPL-SCNC: 142 MMOL/L — SIGNIFICANT CHANGE UP (ref 135–145)

## 2018-07-04 RX ORDER — SODIUM,POTASSIUM PHOSPHATES 278-250MG
1 POWDER IN PACKET (EA) ORAL ONCE
Qty: 0 | Refills: 0 | Status: COMPLETED | OUTPATIENT
Start: 2018-07-04 | End: 2018-07-04

## 2018-07-04 RX ADMIN — Medication 1 PACKET(S): at 13:47

## 2018-07-04 RX ADMIN — CARVEDILOL PHOSPHATE 6.25 MILLIGRAM(S): 80 CAPSULE, EXTENDED RELEASE ORAL at 17:32

## 2018-07-04 RX ADMIN — Medication 81 MILLIGRAM(S): at 13:47

## 2018-07-04 RX ADMIN — Medication 100 MILLIGRAM(S): at 22:10

## 2018-07-04 RX ADMIN — FAMOTIDINE 20 MILLIGRAM(S): 10 INJECTION INTRAVENOUS at 05:18

## 2018-07-04 RX ADMIN — Medication 650 MILLIGRAM(S): at 05:50

## 2018-07-04 RX ADMIN — Medication 100 MILLIGRAM(S): at 05:17

## 2018-07-04 RX ADMIN — Medication 1 PACKET(S): at 13:48

## 2018-07-04 RX ADMIN — Medication 100 MILLIGRAM(S): at 13:47

## 2018-07-04 RX ADMIN — FAMOTIDINE 20 MILLIGRAM(S): 10 INJECTION INTRAVENOUS at 17:31

## 2018-07-04 RX ADMIN — Medication 62.5 MILLIMOLE(S): at 13:47

## 2018-07-04 RX ADMIN — Medication 650 MILLIGRAM(S): at 05:20

## 2018-07-04 RX ADMIN — CARVEDILOL PHOSPHATE 6.25 MILLIGRAM(S): 80 CAPSULE, EXTENDED RELEASE ORAL at 05:17

## 2018-07-04 RX ADMIN — LOSARTAN POTASSIUM 25 MILLIGRAM(S): 100 TABLET, FILM COATED ORAL at 05:17

## 2018-07-04 RX ADMIN — Medication 0.25 MILLIGRAM(S): at 05:27

## 2018-07-04 RX ADMIN — SENNA PLUS 2 TABLET(S): 8.6 TABLET ORAL at 22:10

## 2018-07-04 RX ADMIN — ENOXAPARIN SODIUM 40 MILLIGRAM(S): 100 INJECTION SUBCUTANEOUS at 13:48

## 2018-07-04 RX ADMIN — Medication 0.25 MILLIGRAM(S): at 22:13

## 2018-07-04 NOTE — PROGRESS NOTE ADULT - ATTENDING COMMENTS
Patient seen and examined.  passing flatus, minimal NGT output, abdomen non distended non tympanic.  resolved SBo/constipation.  remote NGT start liquids and ADAT  OOB, Ambulate, dispo planning
Sitting comfortably in chair.  Bowel function resumed and is tolerating regular diet.  RAHULO brace at bedside.  Has worked with PT.  May prabha/alessandra vasques once home PT arranged.
New T9 compression fracture of unknown chronicity but require TLSO brace.  Patient instructed to wear it when out of bed.  Also, w/ fecal impaction 2/2 use of narcotics for the past week for pain from T9 fracture.  Says she feels better.  Abdomen: S/NT/ND.  Getting bowel regimen.  NGT output negligible.  Will clamp.

## 2018-07-04 NOTE — PROGRESS NOTE ADULT - SUBJECTIVE AND OBJECTIVE BOX
INTERVAL HPI/OVERNIGHT EVENTS: No acute events overnight    SUBJECTIVE: NGT removed yesterday, Denies pain this AM. Afebrile. Tolerating CLD diet. Ambulating with TLSO and minimal assistance. +Urination. + Flatus. +BM. Denies F/C/N/V/CP/SOB.       MEDICATIONS  (STANDING):  aspirin enteric coated 81 milliGRAM(s) Oral daily  carvedilol 6.25 milliGRAM(s) Oral every 12 hours  docusate sodium 100 milliGRAM(s) Oral three times a day  enoxaparin Injectable 40 milliGRAM(s) SubCutaneous daily  famotidine Injectable 20 milliGRAM(s) IV Push every 12 hours  losartan 25 milliGRAM(s) Oral daily  senna 2 Tablet(s) Oral at bedtime    MEDICATIONS  (PRN):  acetaminophen   Tablet. 650 milliGRAM(s) Oral every 6 hours PRN Mild Pain (1 - 3)  ALPRAZolam 0.25 milliGRAM(s) Oral three times a day PRN for anxiety  ondansetron Injectable 4 milliGRAM(s) IV Push every 6 hours PRN Nausea and/or Vomiting      Vital Signs Last 24 Hrs  T(C): 36.8 (03 Jul 2018 23:31), Max: 36.8 (03 Jul 2018 15:50)  T(F): 98.2 (03 Jul 2018 23:31), Max: 98.2 (03 Jul 2018 15:50)  HR: 77 (03 Jul 2018 23:31) (77 - 80)  BP: 129/69 (03 Jul 2018 23:31) (122/63 - 129/69)  BP(mean): --  RR: 18 (03 Jul 2018 23:31) (18 - 18)  SpO2: 99% (03 Jul 2018 23:31) (98% - 99%)    PE  Gen: AAO x3, NAD  Pulm: CTAB, Symmetrical chest rise  CV: RRR  Abd: Soft, NT, ND, -R/-G  Ext: No C/C/E  Vasc: 2+ Radial, DP pulses  Neuro: No focal neurological deficits      I&O's Detail    03 Jul 2018 07:01  -  04 Jul 2018 07:00  --------------------------------------------------------  IN:    lactated ringers.: 250 mL    Solution: 50 mL    Solution: 165 mL  Total IN: 465 mL    OUT:  Total OUT: 0 mL    Total NET: 465 mL          LABS:                        10.6   5.9   )-----------( 166      ( 03 Jul 2018 08:10 )             32.8     07-04    142  |  105  |  10.0  ----------------------------<  109  4.2   |  28.0  |  0.56    Ca    8.7      04 Jul 2018 08:02  Phos  1.7     07-04  Mg     2.1     07-04

## 2018-07-04 NOTE — PROGRESS NOTE ADULT - ASSESSMENT
78 year old female recent loop ileostomy reversal. Patient states she has not had a bowel movement in 6 days. No dysuria. No fever, chills, chest pain, dyspnea. Patient was rectally disimpacted aprox a fistful of stool. Patient Unable to have MRI due to defibrillator and dedicated T spine CT redemonstrated T9 compression fracture. TLSO brace at bedside.  - Clear liquid diet, possible advance to regular today  - Monitor bowel function  - Monitor vitals, I/Os  - Continue home medications  - Zofran as needed for nausea  - Encourage OOB, ambulation, IS  - SW for home PT  - DVT ppx

## 2018-07-05 VITALS
HEART RATE: 87 BPM | RESPIRATION RATE: 18 BRPM | SYSTOLIC BLOOD PRESSURE: 144 MMHG | OXYGEN SATURATION: 95 % | DIASTOLIC BLOOD PRESSURE: 79 MMHG | TEMPERATURE: 99 F

## 2018-07-05 LAB
ANION GAP SERPL CALC-SCNC: 8 MMOL/L — SIGNIFICANT CHANGE UP (ref 5–17)
BUN SERPL-MCNC: 11 MG/DL — SIGNIFICANT CHANGE UP (ref 8–20)
CALCIUM SERPL-MCNC: 8.6 MG/DL — SIGNIFICANT CHANGE UP (ref 8.6–10.2)
CHLORIDE SERPL-SCNC: 100 MMOL/L — SIGNIFICANT CHANGE UP (ref 98–107)
CO2 SERPL-SCNC: 28 MMOL/L — SIGNIFICANT CHANGE UP (ref 22–29)
CREAT SERPL-MCNC: 0.54 MG/DL — SIGNIFICANT CHANGE UP (ref 0.5–1.3)
GLUCOSE SERPL-MCNC: 109 MG/DL — SIGNIFICANT CHANGE UP (ref 70–115)
PHOSPHATE SERPL-MCNC: 2.3 MG/DL — LOW (ref 2.4–4.7)
POTASSIUM SERPL-MCNC: 3.5 MMOL/L — SIGNIFICANT CHANGE UP (ref 3.5–5.3)
POTASSIUM SERPL-SCNC: 3.5 MMOL/L — SIGNIFICANT CHANGE UP (ref 3.5–5.3)
SODIUM SERPL-SCNC: 136 MMOL/L — SIGNIFICANT CHANGE UP (ref 135–145)

## 2018-07-05 PROCEDURE — 80048 BASIC METABOLIC PNL TOTAL CA: CPT

## 2018-07-05 PROCEDURE — 83735 ASSAY OF MAGNESIUM: CPT

## 2018-07-05 PROCEDURE — 96374 THER/PROPH/DIAG INJ IV PUSH: CPT

## 2018-07-05 PROCEDURE — 85610 PROTHROMBIN TIME: CPT

## 2018-07-05 PROCEDURE — 84100 ASSAY OF PHOSPHORUS: CPT

## 2018-07-05 PROCEDURE — 97163 PT EVAL HIGH COMPLEX 45 MIN: CPT

## 2018-07-05 PROCEDURE — 97530 THERAPEUTIC ACTIVITIES: CPT

## 2018-07-05 PROCEDURE — 80053 COMPREHEN METABOLIC PANEL: CPT

## 2018-07-05 PROCEDURE — 93005 ELECTROCARDIOGRAM TRACING: CPT

## 2018-07-05 PROCEDURE — 85027 COMPLETE CBC AUTOMATED: CPT

## 2018-07-05 PROCEDURE — 97116 GAIT TRAINING THERAPY: CPT

## 2018-07-05 PROCEDURE — 81001 URINALYSIS AUTO W/SCOPE: CPT

## 2018-07-05 PROCEDURE — 84443 ASSAY THYROID STIM HORMONE: CPT

## 2018-07-05 PROCEDURE — 97760 ORTHOTIC MGMT&TRAING 1ST ENC: CPT

## 2018-07-05 PROCEDURE — 36415 COLL VENOUS BLD VENIPUNCTURE: CPT

## 2018-07-05 PROCEDURE — 84484 ASSAY OF TROPONIN QUANT: CPT

## 2018-07-05 PROCEDURE — 83605 ASSAY OF LACTIC ACID: CPT

## 2018-07-05 PROCEDURE — 96375 TX/PRO/DX INJ NEW DRUG ADDON: CPT

## 2018-07-05 PROCEDURE — 83690 ASSAY OF LIPASE: CPT

## 2018-07-05 PROCEDURE — 72128 CT CHEST SPINE W/O DYE: CPT

## 2018-07-05 PROCEDURE — 99285 EMERGENCY DEPT VISIT HI MDM: CPT | Mod: 25

## 2018-07-05 PROCEDURE — 85730 THROMBOPLASTIN TIME PARTIAL: CPT

## 2018-07-05 PROCEDURE — 74176 CT ABD & PELVIS W/O CONTRAST: CPT

## 2018-07-05 RX ADMIN — FAMOTIDINE 20 MILLIGRAM(S): 10 INJECTION INTRAVENOUS at 17:34

## 2018-07-05 RX ADMIN — Medication 100 MILLIGRAM(S): at 13:43

## 2018-07-05 RX ADMIN — Medication 81 MILLIGRAM(S): at 12:11

## 2018-07-05 RX ADMIN — Medication 100 MILLIGRAM(S): at 05:10

## 2018-07-05 RX ADMIN — Medication 85 MILLIMOLE(S): at 05:09

## 2018-07-05 RX ADMIN — LOSARTAN POTASSIUM 25 MILLIGRAM(S): 100 TABLET, FILM COATED ORAL at 05:10

## 2018-07-05 RX ADMIN — CARVEDILOL PHOSPHATE 6.25 MILLIGRAM(S): 80 CAPSULE, EXTENDED RELEASE ORAL at 17:34

## 2018-07-05 RX ADMIN — ENOXAPARIN SODIUM 40 MILLIGRAM(S): 100 INJECTION SUBCUTANEOUS at 12:11

## 2018-07-05 RX ADMIN — CARVEDILOL PHOSPHATE 6.25 MILLIGRAM(S): 80 CAPSULE, EXTENDED RELEASE ORAL at 05:10

## 2018-07-05 RX ADMIN — FAMOTIDINE 20 MILLIGRAM(S): 10 INJECTION INTRAVENOUS at 05:10

## 2018-07-10 ENCOUNTER — APPOINTMENT (OUTPATIENT)
Dept: ORTHOPEDIC SURGERY | Facility: CLINIC | Age: 78
End: 2018-07-10
Payer: MEDICARE

## 2018-07-10 VITALS
WEIGHT: 130 LBS | BODY MASS INDEX: 23.92 KG/M2 | SYSTOLIC BLOOD PRESSURE: 154 MMHG | HEART RATE: 85 BPM | HEIGHT: 62 IN | DIASTOLIC BLOOD PRESSURE: 81 MMHG

## 2018-07-10 DIAGNOSIS — Z87.39 PERSONAL HISTORY OF OTHER DISEASES OF THE MUSCULOSKELETAL SYSTEM AND CONNECTIVE TISSUE: ICD-10-CM

## 2018-07-10 PROCEDURE — 72070 X-RAY EXAM THORAC SPINE 2VWS: CPT

## 2018-07-10 PROCEDURE — 99204 OFFICE O/P NEW MOD 45 MIN: CPT | Mod: 57

## 2018-07-10 PROCEDURE — 22310 CLOSED TX VERT FX W/O MANJ: CPT

## 2018-07-12 ENCOUNTER — EMERGENCY (EMERGENCY)
Facility: HOSPITAL | Age: 78
LOS: 1 days | Discharge: HOME | End: 2018-07-12
Attending: EMERGENCY MEDICINE
Payer: MEDICARE

## 2018-07-12 VITALS
TEMPERATURE: 98 F | SYSTOLIC BLOOD PRESSURE: 120 MMHG | HEART RATE: 87 BPM | OXYGEN SATURATION: 97 % | RESPIRATION RATE: 18 BRPM | DIASTOLIC BLOOD PRESSURE: 74 MMHG

## 2018-07-12 VITALS — HEIGHT: 62 IN | WEIGHT: 130.07 LBS

## 2018-07-12 DIAGNOSIS — Z95.810 PRESENCE OF AUTOMATIC (IMPLANTABLE) CARDIAC DEFIBRILLATOR: Chronic | ICD-10-CM

## 2018-07-12 DIAGNOSIS — Z98.890 OTHER SPECIFIED POSTPROCEDURAL STATES: Chronic | ICD-10-CM

## 2018-07-12 DIAGNOSIS — Z87.898 PERSONAL HISTORY OF OTHER SPECIFIED CONDITIONS: Chronic | ICD-10-CM

## 2018-07-12 DIAGNOSIS — Z90.49 ACQUIRED ABSENCE OF OTHER SPECIFIED PARTS OF DIGESTIVE TRACT: Chronic | ICD-10-CM

## 2018-07-12 DIAGNOSIS — Z93.2 ILEOSTOMY STATUS: Chronic | ICD-10-CM

## 2018-07-12 LAB
ALBUMIN SERPL ELPH-MCNC: 3.4 G/DL — SIGNIFICANT CHANGE UP (ref 3.3–5.2)
ALP SERPL-CCNC: 167 U/L — HIGH (ref 40–120)
ALT FLD-CCNC: 20 U/L — SIGNIFICANT CHANGE UP
ANION GAP SERPL CALC-SCNC: 12 MMOL/L — SIGNIFICANT CHANGE UP (ref 5–17)
AST SERPL-CCNC: 22 U/L — SIGNIFICANT CHANGE UP
BASOPHILS # BLD AUTO: 0 K/UL — SIGNIFICANT CHANGE UP (ref 0–0.2)
BASOPHILS NFR BLD AUTO: 0.2 % — SIGNIFICANT CHANGE UP (ref 0–2)
BILIRUB SERPL-MCNC: 0.3 MG/DL — LOW (ref 0.4–2)
BUN SERPL-MCNC: 12 MG/DL — SIGNIFICANT CHANGE UP (ref 8–20)
CALCIUM SERPL-MCNC: 9.3 MG/DL — SIGNIFICANT CHANGE UP (ref 8.6–10.2)
CHLORIDE SERPL-SCNC: 102 MMOL/L — SIGNIFICANT CHANGE UP (ref 98–107)
CO2 SERPL-SCNC: 25 MMOL/L — SIGNIFICANT CHANGE UP (ref 22–29)
CREAT SERPL-MCNC: 0.61 MG/DL — SIGNIFICANT CHANGE UP (ref 0.5–1.3)
EOSINOPHIL # BLD AUTO: 0 K/UL — SIGNIFICANT CHANGE UP (ref 0–0.5)
EOSINOPHIL NFR BLD AUTO: 0.3 % — SIGNIFICANT CHANGE UP (ref 0–6)
GLUCOSE SERPL-MCNC: 133 MG/DL — HIGH (ref 70–115)
HCT VFR BLD CALC: 36.5 % — LOW (ref 37–47)
HGB BLD-MCNC: 11.7 G/DL — LOW (ref 12–16)
LIDOCAIN IGE QN: 20 U/L — LOW (ref 22–51)
LYMPHOCYTES # BLD AUTO: 1.3 K/UL — SIGNIFICANT CHANGE UP (ref 1–4.8)
LYMPHOCYTES # BLD AUTO: 13.5 % — LOW (ref 20–55)
MCHC RBC-ENTMCNC: 29 PG — SIGNIFICANT CHANGE UP (ref 27–31)
MCHC RBC-ENTMCNC: 32.1 G/DL — SIGNIFICANT CHANGE UP (ref 32–36)
MCV RBC AUTO: 90.6 FL — SIGNIFICANT CHANGE UP (ref 81–99)
MONOCYTES # BLD AUTO: 0.4 K/UL — SIGNIFICANT CHANGE UP (ref 0–0.8)
MONOCYTES NFR BLD AUTO: 4 % — SIGNIFICANT CHANGE UP (ref 3–10)
NEUTROPHILS # BLD AUTO: 7.9 K/UL — SIGNIFICANT CHANGE UP (ref 1.8–8)
NEUTROPHILS NFR BLD AUTO: 81.8 % — HIGH (ref 37–73)
PLATELET # BLD AUTO: 301 K/UL — SIGNIFICANT CHANGE UP (ref 150–400)
POTASSIUM SERPL-MCNC: 4.1 MMOL/L — SIGNIFICANT CHANGE UP (ref 3.5–5.3)
POTASSIUM SERPL-SCNC: 4.1 MMOL/L — SIGNIFICANT CHANGE UP (ref 3.5–5.3)
PROT SERPL-MCNC: 6 G/DL — LOW (ref 6.6–8.7)
RBC # BLD: 4.03 M/UL — LOW (ref 4.4–5.2)
RBC # FLD: 13.5 % — SIGNIFICANT CHANGE UP (ref 11–15.6)
SODIUM SERPL-SCNC: 139 MMOL/L — SIGNIFICANT CHANGE UP (ref 135–145)
WBC # BLD: 9.7 K/UL — SIGNIFICANT CHANGE UP (ref 4.8–10.8)
WBC # FLD AUTO: 9.7 K/UL — SIGNIFICANT CHANGE UP (ref 4.8–10.8)

## 2018-07-12 PROCEDURE — 80053 COMPREHEN METABOLIC PANEL: CPT

## 2018-07-12 PROCEDURE — 83690 ASSAY OF LIPASE: CPT

## 2018-07-12 PROCEDURE — 74022 RADEX COMPL AQT ABD SERIES: CPT | Mod: 26

## 2018-07-12 PROCEDURE — 99284 EMERGENCY DEPT VISIT MOD MDM: CPT | Mod: 25

## 2018-07-12 PROCEDURE — 74022 RADEX COMPL AQT ABD SERIES: CPT

## 2018-07-12 PROCEDURE — 99284 EMERGENCY DEPT VISIT MOD MDM: CPT

## 2018-07-12 PROCEDURE — 96374 THER/PROPH/DIAG INJ IV PUSH: CPT

## 2018-07-12 PROCEDURE — 85027 COMPLETE CBC AUTOMATED: CPT

## 2018-07-12 PROCEDURE — 36415 COLL VENOUS BLD VENIPUNCTURE: CPT

## 2018-07-12 RX ORDER — PANTOPRAZOLE SODIUM 20 MG/1
40 TABLET, DELAYED RELEASE ORAL ONCE
Qty: 0 | Refills: 0 | Status: COMPLETED | OUTPATIENT
Start: 2018-07-12 | End: 2018-07-12

## 2018-07-12 RX ADMIN — Medication 30 MILLILITER(S): at 06:38

## 2018-07-12 RX ADMIN — PANTOPRAZOLE SODIUM 40 MILLIGRAM(S): 20 TABLET, DELAYED RELEASE ORAL at 06:38

## 2018-07-12 NOTE — ED ADULT NURSE REASSESSMENT NOTE - NS ED NURSE REASSESS COMMENT FT1
Pt safely transported to test via [id number] at this time.
awake and alert,color good,skin warm and dry, calm at this time, daughter remains with patient,hob up 45 degrees,sr up, no redness or swelling noted to lt fa iv site, denies any pain or discomfort, taveras, move self slowly on stretcher for comfort, stretcher in lowest position with wheels locked for safety, dispo pending, patient safety maintained, will continue to monitor
pt at bedside to evaluate ambulation, no limitations noted

## 2018-07-12 NOTE — ED PROVIDER NOTE - OBJECTIVE STATEMENT
77 yo female pmh breast ca, valvular disease comes to ed with abdominal pain which started this eveing; pain has improved while in ed; no nausea , vomiting or diarrhea

## 2018-07-12 NOTE — ED ADULT TRIAGE NOTE - CHIEF COMPLAINT QUOTE
patient states that she was here 6 days ago for a bowel obstruction - patient awake and alert. complains of abdomen pain and nausea

## 2018-07-12 NOTE — ED ADULT NURSE NOTE - OBJECTIVE STATEMENT
patient states that she was here 6 days ago for a bowel obstruction - patient awake and alert. complains of abdomen pain and nausea, with burning in the throat sudden onset after eating spaghetti sauce last night, denies v/d

## 2018-07-18 ENCOUNTER — INPATIENT (INPATIENT)
Facility: HOSPITAL | Age: 78
LOS: 2 days | Discharge: HOME | DRG: 389 | End: 2018-07-21
Attending: SURGERY | Admitting: SURGERY
Payer: MEDICARE

## 2018-07-18 VITALS — HEIGHT: 65 IN | WEIGHT: 130.07 LBS

## 2018-07-18 DIAGNOSIS — Z87.898 PERSONAL HISTORY OF OTHER SPECIFIED CONDITIONS: Chronic | ICD-10-CM

## 2018-07-18 DIAGNOSIS — Z95.810 PRESENCE OF AUTOMATIC (IMPLANTABLE) CARDIAC DEFIBRILLATOR: Chronic | ICD-10-CM

## 2018-07-18 DIAGNOSIS — Z98.890 OTHER SPECIFIED POSTPROCEDURAL STATES: Chronic | ICD-10-CM

## 2018-07-18 DIAGNOSIS — Z93.2 ILEOSTOMY STATUS: Chronic | ICD-10-CM

## 2018-07-18 DIAGNOSIS — Z90.49 ACQUIRED ABSENCE OF OTHER SPECIFIED PARTS OF DIGESTIVE TRACT: Chronic | ICD-10-CM

## 2018-07-18 DIAGNOSIS — K56.609 UNSPECIFIED INTESTINAL OBSTRUCTION, UNSPECIFIED AS TO PARTIAL VERSUS COMPLETE OBSTRUCTION: ICD-10-CM

## 2018-07-18 PROBLEM — I42.9 CARDIOMYOPATHY, UNSPECIFIED: Chronic | Status: ACTIVE | Noted: 2018-02-12

## 2018-07-18 PROBLEM — I38 ENDOCARDITIS, VALVE UNSPECIFIED: Chronic | Status: ACTIVE | Noted: 2018-02-12

## 2018-07-18 PROBLEM — C50.919 MALIGNANT NEOPLASM OF UNSPECIFIED SITE OF UNSPECIFIED FEMALE BREAST: Chronic | Status: ACTIVE | Noted: 2018-02-12

## 2018-07-18 LAB
ALBUMIN SERPL ELPH-MCNC: 3.9 G/DL — SIGNIFICANT CHANGE UP (ref 3.3–5.2)
ALP SERPL-CCNC: 197 U/L — HIGH (ref 40–120)
ALT FLD-CCNC: 28 U/L — SIGNIFICANT CHANGE UP
ANION GAP SERPL CALC-SCNC: 12 MMOL/L — SIGNIFICANT CHANGE UP (ref 5–17)
APTT BLD: 30.2 SEC — SIGNIFICANT CHANGE UP (ref 27.5–37.4)
AST SERPL-CCNC: 27 U/L — SIGNIFICANT CHANGE UP
BASOPHILS # BLD AUTO: 0 K/UL — SIGNIFICANT CHANGE UP (ref 0–0.2)
BASOPHILS NFR BLD AUTO: 0.2 % — SIGNIFICANT CHANGE UP (ref 0–2)
BILIRUB SERPL-MCNC: 0.7 MG/DL — SIGNIFICANT CHANGE UP (ref 0.4–2)
BLD GP AB SCN SERPL QL: SIGNIFICANT CHANGE UP
BUN SERPL-MCNC: 11 MG/DL — SIGNIFICANT CHANGE UP (ref 8–20)
CALCIUM SERPL-MCNC: 10.5 MG/DL — HIGH (ref 8.6–10.2)
CHLORIDE SERPL-SCNC: 100 MMOL/L — SIGNIFICANT CHANGE UP (ref 98–107)
CO2 SERPL-SCNC: 28 MMOL/L — SIGNIFICANT CHANGE UP (ref 22–29)
CREAT SERPL-MCNC: 0.56 MG/DL — SIGNIFICANT CHANGE UP (ref 0.5–1.3)
EOSINOPHIL # BLD AUTO: 0 K/UL — SIGNIFICANT CHANGE UP (ref 0–0.5)
EOSINOPHIL NFR BLD AUTO: 0.4 % — SIGNIFICANT CHANGE UP (ref 0–6)
GLUCOSE SERPL-MCNC: 120 MG/DL — HIGH (ref 70–115)
HCT VFR BLD CALC: 38.7 % — SIGNIFICANT CHANGE UP (ref 37–47)
HGB BLD-MCNC: 12.6 G/DL — SIGNIFICANT CHANGE UP (ref 12–16)
INR BLD: 1.07 RATIO — SIGNIFICANT CHANGE UP (ref 0.88–1.16)
LACTATE BLDV-MCNC: 0.5 MMOL/L — SIGNIFICANT CHANGE UP (ref 0.5–2)
LYMPHOCYTES # BLD AUTO: 1.1 K/UL — SIGNIFICANT CHANGE UP (ref 1–4.8)
LYMPHOCYTES # BLD AUTO: 10.1 % — LOW (ref 20–55)
MCHC RBC-ENTMCNC: 29.5 PG — SIGNIFICANT CHANGE UP (ref 27–31)
MCHC RBC-ENTMCNC: 32.6 G/DL — SIGNIFICANT CHANGE UP (ref 32–36)
MCV RBC AUTO: 90.6 FL — SIGNIFICANT CHANGE UP (ref 81–99)
MONOCYTES # BLD AUTO: 0.5 K/UL — SIGNIFICANT CHANGE UP (ref 0–0.8)
MONOCYTES NFR BLD AUTO: 4.8 % — SIGNIFICANT CHANGE UP (ref 3–10)
NEUTROPHILS # BLD AUTO: 9 K/UL — HIGH (ref 1.8–8)
NEUTROPHILS NFR BLD AUTO: 84.3 % — HIGH (ref 37–73)
PLATELET # BLD AUTO: 329 K/UL — SIGNIFICANT CHANGE UP (ref 150–400)
POTASSIUM SERPL-MCNC: 5 MMOL/L — SIGNIFICANT CHANGE UP (ref 3.5–5.3)
POTASSIUM SERPL-SCNC: 5 MMOL/L — SIGNIFICANT CHANGE UP (ref 3.5–5.3)
PROT SERPL-MCNC: 7 G/DL — SIGNIFICANT CHANGE UP (ref 6.6–8.7)
PROTHROM AB SERPL-ACNC: 11.8 SEC — SIGNIFICANT CHANGE UP (ref 9.8–12.7)
RBC # BLD: 4.27 M/UL — LOW (ref 4.4–5.2)
RBC # FLD: 13.7 % — SIGNIFICANT CHANGE UP (ref 11–15.6)
SODIUM SERPL-SCNC: 140 MMOL/L — SIGNIFICANT CHANGE UP (ref 135–145)
TYPE + AB SCN PNL BLD: SIGNIFICANT CHANGE UP
WBC # BLD: 10.6 K/UL — SIGNIFICANT CHANGE UP (ref 4.8–10.8)
WBC # FLD AUTO: 10.6 K/UL — SIGNIFICANT CHANGE UP (ref 4.8–10.8)

## 2018-07-18 PROCEDURE — 74176 CT ABD & PELVIS W/O CONTRAST: CPT | Mod: 26

## 2018-07-18 PROCEDURE — 99285 EMERGENCY DEPT VISIT HI MDM: CPT

## 2018-07-18 PROCEDURE — 74022 RADEX COMPL AQT ABD SERIES: CPT | Mod: 26

## 2018-07-18 RX ORDER — ONDANSETRON 8 MG/1
4 TABLET, FILM COATED ORAL ONCE
Qty: 0 | Refills: 0 | Status: COMPLETED | OUTPATIENT
Start: 2018-07-18 | End: 2018-07-18

## 2018-07-18 RX ORDER — ACETAMINOPHEN 500 MG
975 TABLET ORAL ONCE
Qty: 0 | Refills: 0 | Status: COMPLETED | OUTPATIENT
Start: 2018-07-18 | End: 2018-07-18

## 2018-07-18 RX ADMIN — Medication 975 MILLIGRAM(S): at 16:18

## 2018-07-18 RX ADMIN — ONDANSETRON 4 MILLIGRAM(S): 8 TABLET, FILM COATED ORAL at 18:51

## 2018-07-18 RX ADMIN — Medication 975 MILLIGRAM(S): at 18:51

## 2018-07-18 RX ADMIN — Medication 1 MILLIGRAM(S): at 18:51

## 2018-07-18 NOTE — ED ADULT NURSE NOTE - CHIEF COMPLAINT QUOTE
pt states she was evaluated last thursday in [facility] for abdominal pain. pt back today c/o the same and burning to her throat which started last night. pt with hx of ileostomy which was reversed in february.. pt denies diarhea and vomiting but had some nausea

## 2018-07-18 NOTE — ED ADULT NURSE NOTE - OBJECTIVE STATEMENT
pt presents to ED with epigastric pain with nausea,. denies v/d afebrile. breathing si even and unlabored. a&ox3. will continue to monitor and reassess

## 2018-07-18 NOTE — ED STATDOCS - ATTENDING CONTRIBUTION TO CARE
I, Malissa Gilbert, independently evaluated the patient. The PA made the initial evaluation and discussed history, physical and plan with me and I agree. I examined the patient who is actively vomiting, appears uncomfortable with distended abdomen with hyperactive bowel sounds.

## 2018-07-18 NOTE — ED STATDOCS - PROGRESS NOTE DETAILS
patient with recent h/o of SBO x 2 weeks ago, surgery notified, patient actively vomiting in ED, likely obstucted again, surgery notified, labs and CT ordered placed in main for further eval NGT placement attempted x 2, surgery notified, CXR +SBO, unable to tolerate PO contrast 16 Fr NG tube place on low intermittent suction, pending CT read

## 2018-07-18 NOTE — ED ADULT TRIAGE NOTE - CHIEF COMPLAINT QUOTE
pt states she was evaluated last thursday in [facility] for abdominal pain. pt back today c/o the same and burning to her throat which started last night. pt with hx of ileostomy which was reversed in february.. pt denies diarhea and vomiting but had some nausea

## 2018-07-18 NOTE — ED STATDOCS - OBJECTIVE STATEMENT
This is a 78 year old female with c/o abdominal pain x 1 day, she reports was seen in ED x 2 weeks ago for similar and was told she had a blockage.  She notes  symptoms feel similar.  She notes This is a 78 year old female with c/o abdominal pain x 1 day, she reports was seen in ED x 2 weeks ago for similar and was told she had a blockage.  She notes  symptoms feel similar.  She notes does not remember when last BM was.

## 2018-07-19 LAB
ANION GAP SERPL CALC-SCNC: 8 MMOL/L — SIGNIFICANT CHANGE UP (ref 5–17)
APPEARANCE UR: ABNORMAL
BASOPHILS # BLD AUTO: 0 K/UL — SIGNIFICANT CHANGE UP (ref 0–0.2)
BASOPHILS NFR BLD AUTO: 0.3 % — SIGNIFICANT CHANGE UP (ref 0–2)
BILIRUB UR-MCNC: NEGATIVE — SIGNIFICANT CHANGE UP
BUN SERPL-MCNC: 12 MG/DL — SIGNIFICANT CHANGE UP (ref 8–20)
CALCIUM SERPL-MCNC: 9.4 MG/DL — SIGNIFICANT CHANGE UP (ref 8.6–10.2)
CHLORIDE SERPL-SCNC: 105 MMOL/L — SIGNIFICANT CHANGE UP (ref 98–107)
CO2 SERPL-SCNC: 27 MMOL/L — SIGNIFICANT CHANGE UP (ref 22–29)
COLOR SPEC: YELLOW — SIGNIFICANT CHANGE UP
CREAT SERPL-MCNC: 0.62 MG/DL — SIGNIFICANT CHANGE UP (ref 0.5–1.3)
DIFF PNL FLD: ABNORMAL
EOSINOPHIL # BLD AUTO: 0.1 K/UL — SIGNIFICANT CHANGE UP (ref 0–0.5)
EOSINOPHIL NFR BLD AUTO: 0.9 % — SIGNIFICANT CHANGE UP (ref 0–6)
GLUCOSE SERPL-MCNC: 98 MG/DL — SIGNIFICANT CHANGE UP (ref 70–115)
GLUCOSE UR QL: NEGATIVE MG/DL — SIGNIFICANT CHANGE UP
HCT VFR BLD CALC: 35.2 % — LOW (ref 37–47)
HGB BLD-MCNC: 11.2 G/DL — LOW (ref 12–16)
KETONES UR-MCNC: ABNORMAL
LEUKOCYTE ESTERASE UR-ACNC: ABNORMAL
LYMPHOCYTES # BLD AUTO: 1.4 K/UL — SIGNIFICANT CHANGE UP (ref 1–4.8)
LYMPHOCYTES # BLD AUTO: 18.6 % — LOW (ref 20–55)
MCHC RBC-ENTMCNC: 28.9 PG — SIGNIFICANT CHANGE UP (ref 27–31)
MCHC RBC-ENTMCNC: 31.8 G/DL — LOW (ref 32–36)
MCV RBC AUTO: 90.7 FL — SIGNIFICANT CHANGE UP (ref 81–99)
MONOCYTES # BLD AUTO: 0.4 K/UL — SIGNIFICANT CHANGE UP (ref 0–0.8)
MONOCYTES NFR BLD AUTO: 4.8 % — SIGNIFICANT CHANGE UP (ref 3–10)
NEUTROPHILS # BLD AUTO: 5.9 K/UL — SIGNIFICANT CHANGE UP (ref 1.8–8)
NEUTROPHILS NFR BLD AUTO: 75.3 % — HIGH (ref 37–73)
NITRITE UR-MCNC: NEGATIVE — SIGNIFICANT CHANGE UP
PH UR: 6 — SIGNIFICANT CHANGE UP (ref 5–8)
PLATELET # BLD AUTO: 276 K/UL — SIGNIFICANT CHANGE UP (ref 150–400)
POTASSIUM SERPL-MCNC: 4.2 MMOL/L — SIGNIFICANT CHANGE UP (ref 3.5–5.3)
POTASSIUM SERPL-SCNC: 4.2 MMOL/L — SIGNIFICANT CHANGE UP (ref 3.5–5.3)
PROT UR-MCNC: 15 MG/DL
RBC # BLD: 3.88 M/UL — LOW (ref 4.4–5.2)
RBC # FLD: 13.6 % — SIGNIFICANT CHANGE UP (ref 11–15.6)
SODIUM SERPL-SCNC: 140 MMOL/L — SIGNIFICANT CHANGE UP (ref 135–145)
SP GR SPEC: 1.02 — SIGNIFICANT CHANGE UP (ref 1.01–1.02)
UROBILINOGEN FLD QL: NEGATIVE MG/DL — SIGNIFICANT CHANGE UP
WBC # BLD: 7.8 K/UL — SIGNIFICANT CHANGE UP (ref 4.8–10.8)
WBC # FLD AUTO: 7.8 K/UL — SIGNIFICANT CHANGE UP (ref 4.8–10.8)

## 2018-07-19 PROCEDURE — 71045 X-RAY EXAM CHEST 1 VIEW: CPT | Mod: 26

## 2018-07-19 PROCEDURE — 93010 ELECTROCARDIOGRAM REPORT: CPT

## 2018-07-19 PROCEDURE — 74018 RADEX ABDOMEN 1 VIEW: CPT | Mod: 26

## 2018-07-19 RX ORDER — LOSARTAN POTASSIUM 100 MG/1
25 TABLET, FILM COATED ORAL DAILY
Qty: 0 | Refills: 0 | Status: DISCONTINUED | OUTPATIENT
Start: 2018-07-19 | End: 2018-07-19

## 2018-07-19 RX ORDER — ONDANSETRON 8 MG/1
4 TABLET, FILM COATED ORAL EVERY 6 HOURS
Qty: 0 | Refills: 0 | Status: DISCONTINUED | OUTPATIENT
Start: 2018-07-19 | End: 2018-07-21

## 2018-07-19 RX ORDER — ACETAMINOPHEN 500 MG
1000 TABLET ORAL ONCE
Qty: 0 | Refills: 0 | Status: COMPLETED | OUTPATIENT
Start: 2018-07-19 | End: 2018-07-19

## 2018-07-19 RX ORDER — CARVEDILOL PHOSPHATE 80 MG/1
6.25 CAPSULE, EXTENDED RELEASE ORAL EVERY 12 HOURS
Qty: 0 | Refills: 0 | Status: DISCONTINUED | OUTPATIENT
Start: 2018-07-19 | End: 2018-07-19

## 2018-07-19 RX ORDER — ALPRAZOLAM 0.25 MG
0.25 TABLET ORAL THREE TIMES A DAY
Qty: 0 | Refills: 0 | Status: DISCONTINUED | OUTPATIENT
Start: 2018-07-19 | End: 2018-07-21

## 2018-07-19 RX ORDER — ENOXAPARIN SODIUM 100 MG/ML
40 INJECTION SUBCUTANEOUS EVERY 24 HOURS
Qty: 0 | Refills: 0 | Status: DISCONTINUED | OUTPATIENT
Start: 2018-07-19 | End: 2018-07-21

## 2018-07-19 RX ORDER — LOSARTAN POTASSIUM 100 MG/1
25 TABLET, FILM COATED ORAL DAILY
Qty: 0 | Refills: 0 | Status: DISCONTINUED | OUTPATIENT
Start: 2018-07-19 | End: 2018-07-21

## 2018-07-19 RX ORDER — CARVEDILOL PHOSPHATE 80 MG/1
6.25 CAPSULE, EXTENDED RELEASE ORAL EVERY 12 HOURS
Qty: 0 | Refills: 0 | Status: DISCONTINUED | OUTPATIENT
Start: 2018-07-19 | End: 2018-07-21

## 2018-07-19 RX ORDER — DOCUSATE SODIUM 100 MG
100 CAPSULE ORAL THREE TIMES A DAY
Qty: 0 | Refills: 0 | Status: DISCONTINUED | OUTPATIENT
Start: 2018-07-19 | End: 2018-07-19

## 2018-07-19 RX ORDER — SODIUM CHLORIDE 9 MG/ML
1000 INJECTION, SOLUTION INTRAVENOUS
Qty: 0 | Refills: 0 | Status: DISCONTINUED | OUTPATIENT
Start: 2018-07-19 | End: 2018-07-21

## 2018-07-19 RX ORDER — SENNA PLUS 8.6 MG/1
2 TABLET ORAL AT BEDTIME
Qty: 0 | Refills: 0 | Status: DISCONTINUED | OUTPATIENT
Start: 2018-07-19 | End: 2018-07-19

## 2018-07-19 RX ORDER — ASPIRIN/CALCIUM CARB/MAGNESIUM 324 MG
81 TABLET ORAL DAILY
Qty: 0 | Refills: 0 | Status: DISCONTINUED | OUTPATIENT
Start: 2018-07-19 | End: 2018-07-19

## 2018-07-19 RX ADMIN — SODIUM CHLORIDE 75 MILLILITER(S): 9 INJECTION, SOLUTION INTRAVENOUS at 05:42

## 2018-07-19 RX ADMIN — Medication 1000 MILLIGRAM(S): at 02:40

## 2018-07-19 RX ADMIN — CARVEDILOL PHOSPHATE 6.25 MILLIGRAM(S): 80 CAPSULE, EXTENDED RELEASE ORAL at 18:45

## 2018-07-19 RX ADMIN — ENOXAPARIN SODIUM 40 MILLIGRAM(S): 100 INJECTION SUBCUTANEOUS at 05:42

## 2018-07-19 RX ADMIN — SODIUM CHLORIDE 75 MILLILITER(S): 9 INJECTION, SOLUTION INTRAVENOUS at 20:54

## 2018-07-19 RX ADMIN — Medication 400 MILLIGRAM(S): at 02:27

## 2018-07-20 DIAGNOSIS — K56.609 UNSPECIFIED INTESTINAL OBSTRUCTION, UNSPECIFIED AS TO PARTIAL VERSUS COMPLETE OBSTRUCTION: ICD-10-CM

## 2018-07-20 DIAGNOSIS — I10 ESSENTIAL (PRIMARY) HYPERTENSION: ICD-10-CM

## 2018-07-20 LAB
ANION GAP SERPL CALC-SCNC: 10 MMOL/L — SIGNIFICANT CHANGE UP (ref 5–17)
APTT BLD: 30.6 SEC — SIGNIFICANT CHANGE UP (ref 27.5–37.4)
BASOPHILS # BLD AUTO: 0 K/UL — SIGNIFICANT CHANGE UP (ref 0–0.2)
BASOPHILS NFR BLD AUTO: 0.4 % — SIGNIFICANT CHANGE UP (ref 0–2)
BUN SERPL-MCNC: 11 MG/DL — SIGNIFICANT CHANGE UP (ref 8–20)
CALCIUM SERPL-MCNC: 9.5 MG/DL — SIGNIFICANT CHANGE UP (ref 8.6–10.2)
CHLORIDE SERPL-SCNC: 104 MMOL/L — SIGNIFICANT CHANGE UP (ref 98–107)
CO2 SERPL-SCNC: 27 MMOL/L — SIGNIFICANT CHANGE UP (ref 22–29)
CREAT SERPL-MCNC: 0.53 MG/DL — SIGNIFICANT CHANGE UP (ref 0.5–1.3)
EOSINOPHIL # BLD AUTO: 0.1 K/UL — SIGNIFICANT CHANGE UP (ref 0–0.5)
EOSINOPHIL NFR BLD AUTO: 1.5 % — SIGNIFICANT CHANGE UP (ref 0–6)
GLUCOSE SERPL-MCNC: 93 MG/DL — SIGNIFICANT CHANGE UP (ref 70–115)
HCT VFR BLD CALC: 33.5 % — LOW (ref 37–47)
HGB BLD-MCNC: 10.6 G/DL — LOW (ref 12–16)
INR BLD: 1.14 RATIO — SIGNIFICANT CHANGE UP (ref 0.88–1.16)
LYMPHOCYTES # BLD AUTO: 1.2 K/UL — SIGNIFICANT CHANGE UP (ref 1–4.8)
LYMPHOCYTES # BLD AUTO: 25.8 % — SIGNIFICANT CHANGE UP (ref 20–55)
MAGNESIUM SERPL-MCNC: 2.1 MG/DL — SIGNIFICANT CHANGE UP (ref 1.6–2.6)
MCHC RBC-ENTMCNC: 28.9 PG — SIGNIFICANT CHANGE UP (ref 27–31)
MCHC RBC-ENTMCNC: 31.6 G/DL — LOW (ref 32–36)
MCV RBC AUTO: 91.3 FL — SIGNIFICANT CHANGE UP (ref 81–99)
MONOCYTES # BLD AUTO: 0.3 K/UL — SIGNIFICANT CHANGE UP (ref 0–0.8)
MONOCYTES NFR BLD AUTO: 6.8 % — SIGNIFICANT CHANGE UP (ref 3–10)
NEUTROPHILS # BLD AUTO: 3 K/UL — SIGNIFICANT CHANGE UP (ref 1.8–8)
NEUTROPHILS NFR BLD AUTO: 65.5 % — SIGNIFICANT CHANGE UP (ref 37–73)
PLATELET # BLD AUTO: 256 K/UL — SIGNIFICANT CHANGE UP (ref 150–400)
POTASSIUM SERPL-MCNC: 3.9 MMOL/L — SIGNIFICANT CHANGE UP (ref 3.5–5.3)
POTASSIUM SERPL-SCNC: 3.9 MMOL/L — SIGNIFICANT CHANGE UP (ref 3.5–5.3)
PROTHROM AB SERPL-ACNC: 12.6 SEC — SIGNIFICANT CHANGE UP (ref 9.8–12.7)
RBC # BLD: 3.67 M/UL — LOW (ref 4.4–5.2)
RBC # FLD: 13.5 % — SIGNIFICANT CHANGE UP (ref 11–15.6)
SODIUM SERPL-SCNC: 141 MMOL/L — SIGNIFICANT CHANGE UP (ref 135–145)
WBC # BLD: 4.6 K/UL — LOW (ref 4.8–10.8)
WBC # FLD AUTO: 4.6 K/UL — LOW (ref 4.8–10.8)

## 2018-07-20 RX ORDER — DOCUSATE SODIUM 100 MG
100 CAPSULE ORAL THREE TIMES A DAY
Qty: 0 | Refills: 0 | Status: DISCONTINUED | OUTPATIENT
Start: 2018-07-20 | End: 2018-07-21

## 2018-07-20 RX ORDER — SENNA PLUS 8.6 MG/1
2 TABLET ORAL AT BEDTIME
Qty: 0 | Refills: 0 | Status: DISCONTINUED | OUTPATIENT
Start: 2018-07-20 | End: 2018-07-21

## 2018-07-20 RX ADMIN — ENOXAPARIN SODIUM 40 MILLIGRAM(S): 100 INJECTION SUBCUTANEOUS at 05:40

## 2018-07-20 RX ADMIN — CARVEDILOL PHOSPHATE 6.25 MILLIGRAM(S): 80 CAPSULE, EXTENDED RELEASE ORAL at 17:40

## 2018-07-20 RX ADMIN — Medication 10 MILLIGRAM(S): at 21:22

## 2018-07-20 RX ADMIN — SENNA PLUS 2 TABLET(S): 8.6 TABLET ORAL at 21:22

## 2018-07-20 RX ADMIN — LOSARTAN POTASSIUM 25 MILLIGRAM(S): 100 TABLET, FILM COATED ORAL at 05:40

## 2018-07-20 RX ADMIN — CARVEDILOL PHOSPHATE 6.25 MILLIGRAM(S): 80 CAPSULE, EXTENDED RELEASE ORAL at 05:40

## 2018-07-20 RX ADMIN — Medication 100 MILLIGRAM(S): at 17:40

## 2018-07-20 RX ADMIN — Medication 100 MILLIGRAM(S): at 21:22

## 2018-07-20 NOTE — PROGRESS NOTE ADULT - ASSESSMENT
78F PMHx Htn, valvular disease, AICD, Breast Ca, appendectomy, oophrectomy, ileostomy admit with high grade SBO

## 2018-07-20 NOTE — PROGRESS NOTE ADULT - SUBJECTIVE AND OBJECTIVE BOX
INTERVAL HPI/OVERNIGHT EVENTS:  no overnight events, passing flatus, very small BM, c/o constipation    STATUS POST:      POST OPERATIVE DAY #:     SUBJECTIVE:  Flatus: [ x] YES [ ] NO             Bowel Movement: [ x] YES [ ] NO  Pain (0-10):            Pain Control Adequate: [x ] YES [ ] NO  Nausea: [ ] YES [x ] NO            Vomiting: [ ] YES [ x] NO  Diarrhea: [ ] YES [ x] NO         Constipation: [x ] YES [ ] NO     Chest Pain: [ ] YES x[ ] NO    SOB:  [ ] YES [x ] NO    MEDICATIONS  (STANDING):  carvedilol 6.25 milliGRAM(s) Oral every 12 hours  docusate sodium 100 milliGRAM(s) Oral three times a day  enoxaparin Injectable 40 milliGRAM(s) SubCutaneous every 24 hours  lactated ringers. 1000 milliLiter(s) (75 mL/Hr) IV Continuous <Continuous>  losartan 25 milliGRAM(s) Oral daily  senna 2 Tablet(s) Oral at bedtime    MEDICATIONS  (PRN):  ALPRAZolam 0.25 milliGRAM(s) Oral three times a day PRN for anxiety  bisacodyl Suppository 10 milliGRAM(s) Rectal daily PRN Constipation  ondansetron Injectable 4 milliGRAM(s) IV Push every 6 hours PRN Nausea      Vital Signs Last 24 Hrs  T(C): 36.9 (2018 11:33), Max: 36.9 (2018 11:33)  T(F): 98.5 (2018 11:33), Max: 98.5 (2018 11:33)  HR: 78 (2018 11:33) (70 - 88)  BP: 102/60 (2018 11:33) (100/54 - 130/73)  BP(mean): --  RR: 18 (2018 11:33) (17 - 18)  SpO2: 96% (2018 23:00) (96% - 98%)    PHYSICAL EXAM:      Constitutional: NAD    Respiratory: CTAB    Cardiovascular: S1S2    Gastrointestinal: soft, NT/ND    Extremities: no edema        I&O's Detail      LABS:                        10.6   4.6   )-----------( 256      ( 2018 06:47 )             33.5     07-20    141  |  104  |  11.0  ----------------------------<  93  3.9   |  27.0  |  0.53    Ca    9.5      2018 06:47  Mg     2.1     07-20    TPro  7.0  /  Alb  3.9  /  TBili  0.7  /  DBili  x   /  AST  27  /  ALT  28  /  AlkPhos  197<H>  07-18    PT/INR - ( 2018 06:47 )   PT: 12.6 sec;   INR: 1.14 ratio         PTT - ( 2018 06:47 )  PTT:30.6 sec  Urinalysis Basic - ( 2018 06:01 )    Color: Yellow / Appearance: Slightly Turbid / S.020 / pH: x  Gluc: x / Ketone: Trace  / Bili: Negative / Urobili: Negative mg/dL   Blood: x / Protein: 15 mg/dL / Nitrite: Negative   Leuk Esterase: Small / RBC: 0-2 /HPF / WBC 26-50   Sq Epi: x / Non Sq Epi: Few / Bacteria: Many        RADIOLOGY & ADDITIONAL STUDIES:

## 2018-07-20 NOTE — PROGRESS NOTE ADULT - PROBLEM SELECTOR PLAN 1
Resolved, diet advanced to regular, tolerating, dulcolax suppository given to help facilitate BM, home bowel regimen senna/colace restarted

## 2018-07-21 ENCOUNTER — TRANSCRIPTION ENCOUNTER (OUTPATIENT)
Age: 78
End: 2018-07-21

## 2018-07-21 VITALS
TEMPERATURE: 98 F | DIASTOLIC BLOOD PRESSURE: 68 MMHG | HEART RATE: 68 BPM | RESPIRATION RATE: 18 BRPM | SYSTOLIC BLOOD PRESSURE: 122 MMHG | OXYGEN SATURATION: 97 %

## 2018-07-21 LAB
-  AMIKACIN: SIGNIFICANT CHANGE UP
-  AMIKACIN: SIGNIFICANT CHANGE UP
-  AMPICILLIN/SULBACTAM: SIGNIFICANT CHANGE UP
-  AMPICILLIN/SULBACTAM: SIGNIFICANT CHANGE UP
-  AMPICILLIN: SIGNIFICANT CHANGE UP
-  AMPICILLIN: SIGNIFICANT CHANGE UP
-  AZTREONAM: SIGNIFICANT CHANGE UP
-  AZTREONAM: SIGNIFICANT CHANGE UP
-  CEFAZOLIN: SIGNIFICANT CHANGE UP
-  CEFAZOLIN: SIGNIFICANT CHANGE UP
-  CEFEPIME: SIGNIFICANT CHANGE UP
-  CEFEPIME: SIGNIFICANT CHANGE UP
-  CEFOXITIN: SIGNIFICANT CHANGE UP
-  CEFOXITIN: SIGNIFICANT CHANGE UP
-  CEFTRIAXONE: SIGNIFICANT CHANGE UP
-  CEFTRIAXONE: SIGNIFICANT CHANGE UP
-  CIPROFLOXACIN: SIGNIFICANT CHANGE UP
-  CIPROFLOXACIN: SIGNIFICANT CHANGE UP
-  ERTAPENEM: SIGNIFICANT CHANGE UP
-  ERTAPENEM: SIGNIFICANT CHANGE UP
-  GENTAMICIN: SIGNIFICANT CHANGE UP
-  GENTAMICIN: SIGNIFICANT CHANGE UP
-  IMIPENEM: SIGNIFICANT CHANGE UP
-  IMIPENEM: SIGNIFICANT CHANGE UP
-  LEVOFLOXACIN: SIGNIFICANT CHANGE UP
-  LEVOFLOXACIN: SIGNIFICANT CHANGE UP
-  MEROPENEM: SIGNIFICANT CHANGE UP
-  MEROPENEM: SIGNIFICANT CHANGE UP
-  NITROFURANTOIN: SIGNIFICANT CHANGE UP
-  NITROFURANTOIN: SIGNIFICANT CHANGE UP
-  PIPERACILLIN/TAZOBACTAM: SIGNIFICANT CHANGE UP
-  PIPERACILLIN/TAZOBACTAM: SIGNIFICANT CHANGE UP
-  TIGECYCLINE: SIGNIFICANT CHANGE UP
-  TIGECYCLINE: SIGNIFICANT CHANGE UP
-  TOBRAMYCIN: SIGNIFICANT CHANGE UP
-  TOBRAMYCIN: SIGNIFICANT CHANGE UP
-  TRIMETHOPRIM/SULFAMETHOXAZOLE: SIGNIFICANT CHANGE UP
-  TRIMETHOPRIM/SULFAMETHOXAZOLE: SIGNIFICANT CHANGE UP
CULTURE RESULTS: SIGNIFICANT CHANGE UP
METHOD TYPE: SIGNIFICANT CHANGE UP
METHOD TYPE: SIGNIFICANT CHANGE UP
ORGANISM # SPEC MICROSCOPIC CNT: SIGNIFICANT CHANGE UP
SPECIMEN SOURCE: SIGNIFICANT CHANGE UP

## 2018-07-21 PROCEDURE — 83605 ASSAY OF LACTIC ACID: CPT

## 2018-07-21 PROCEDURE — 96374 THER/PROPH/DIAG INJ IV PUSH: CPT

## 2018-07-21 PROCEDURE — 74022 RADEX COMPL AQT ABD SERIES: CPT

## 2018-07-21 PROCEDURE — 96375 TX/PRO/DX INJ NEW DRUG ADDON: CPT

## 2018-07-21 PROCEDURE — 83735 ASSAY OF MAGNESIUM: CPT

## 2018-07-21 PROCEDURE — 74176 CT ABD & PELVIS W/O CONTRAST: CPT

## 2018-07-21 PROCEDURE — 36415 COLL VENOUS BLD VENIPUNCTURE: CPT

## 2018-07-21 PROCEDURE — 99285 EMERGENCY DEPT VISIT HI MDM: CPT | Mod: 25

## 2018-07-21 PROCEDURE — 74018 RADEX ABDOMEN 1 VIEW: CPT

## 2018-07-21 PROCEDURE — 85610 PROTHROMBIN TIME: CPT

## 2018-07-21 PROCEDURE — 71045 X-RAY EXAM CHEST 1 VIEW: CPT

## 2018-07-21 PROCEDURE — 87086 URINE CULTURE/COLONY COUNT: CPT

## 2018-07-21 PROCEDURE — 93005 ELECTROCARDIOGRAM TRACING: CPT

## 2018-07-21 PROCEDURE — 87186 SC STD MICRODIL/AGAR DIL: CPT

## 2018-07-21 PROCEDURE — 86850 RBC ANTIBODY SCREEN: CPT

## 2018-07-21 PROCEDURE — 81001 URINALYSIS AUTO W/SCOPE: CPT

## 2018-07-21 PROCEDURE — 85730 THROMBOPLASTIN TIME PARTIAL: CPT

## 2018-07-21 PROCEDURE — 85027 COMPLETE CBC AUTOMATED: CPT

## 2018-07-21 PROCEDURE — 80048 BASIC METABOLIC PNL TOTAL CA: CPT

## 2018-07-21 PROCEDURE — 80053 COMPREHEN METABOLIC PANEL: CPT

## 2018-07-21 PROCEDURE — 86900 BLOOD TYPING SEROLOGIC ABO: CPT

## 2018-07-21 PROCEDURE — 86901 BLOOD TYPING SEROLOGIC RH(D): CPT

## 2018-07-21 RX ORDER — ALPRAZOLAM 0.25 MG
1 TABLET ORAL
Qty: 0 | Refills: 0 | COMMUNITY

## 2018-07-21 RX ORDER — NITROFURANTOIN MACROCRYSTAL 50 MG
1 CAPSULE ORAL
Qty: 10 | Refills: 0 | OUTPATIENT
Start: 2018-07-21 | End: 2018-07-25

## 2018-07-21 RX ORDER — SENNA PLUS 8.6 MG/1
2 TABLET ORAL
Qty: 0 | Refills: 0 | COMMUNITY

## 2018-07-21 RX ORDER — LOSARTAN POTASSIUM 100 MG/1
1 TABLET, FILM COATED ORAL
Qty: 0 | Refills: 0 | COMMUNITY

## 2018-07-21 RX ORDER — CARVEDILOL PHOSPHATE 80 MG/1
1 CAPSULE, EXTENDED RELEASE ORAL
Qty: 0 | Refills: 0 | COMMUNITY

## 2018-07-21 RX ORDER — CYCLOSPORINE 0.5 MG/ML
1 EMULSION OPHTHALMIC
Qty: 0 | Refills: 0 | COMMUNITY

## 2018-07-21 RX ADMIN — ENOXAPARIN SODIUM 40 MILLIGRAM(S): 100 INJECTION SUBCUTANEOUS at 05:18

## 2018-07-21 RX ADMIN — Medication 100 MILLIGRAM(S): at 05:18

## 2018-07-21 RX ADMIN — CARVEDILOL PHOSPHATE 6.25 MILLIGRAM(S): 80 CAPSULE, EXTENDED RELEASE ORAL at 05:18

## 2018-07-21 RX ADMIN — LOSARTAN POTASSIUM 25 MILLIGRAM(S): 100 TABLET, FILM COATED ORAL at 05:18

## 2018-07-21 NOTE — DISCHARGE NOTE ADULT - HOSPITAL COURSE
HPI: 78y Female hx sigmoidectomy and loop ileostomy (7/27/17) for perforated diverticulitis s/p loop ileostomy reversal (2/22/18) presented to ED with 1 day history of epigastric abdominal pain and nausea. Patient in the ED had NBNB emesis, NGT was placed with gastric content output. Patient continued to do well with the NGT. NGT was removed and SBO was resolved. She was tolerating clears and advanced to regular diet. Patient had large bowel movements and is stable to discharge.

## 2018-07-21 NOTE — DISCHARGE NOTE ADULT - CARE PROVIDER_API CALL
[facility],   [address] [place] [address], 1st [place] [address]  Phone: [phone]  Fax: (   )    -

## 2018-07-21 NOTE — DISCHARGE NOTE ADULT - PLAN OF CARE
Resolution of SBO Non-operative management of SBO. Bowel function returned, patient is tolerating regular diet without nausea and vomiting. If you begin to have similar symptoms of abdominal pain, fever, chills, nausea, vomiting, constipation, diarrhea, please come back to the ED.   Please follow up with the [facility] in 7-10 days.   [facility]  [phone]  [address] [place] [address] 1st floor  [place] [address] Resolution of infection You have been placed on macrobid 100mg twice a day for a 5 day course. Please take all of the medications until you run out. Non-operative management of SBO. Bowel function returned, patient is tolerating regular diet without nausea and vomiting. If you begin to have similar symptoms of abdominal pain, fever, chills, nausea, vomiting, constipation, diarrhea, please come back to the ED.   Please follow up with the [facility] in 14 days.   [facility]  [phone]  [address] [place] [address] 1st floor  [place] [id number]

## 2018-07-21 NOTE — DISCHARGE NOTE ADULT - PROVIDER TOKENS
FREE:[LAST:[[facility]],PHONE:[phone]],FAX:[(   )    -],ADDRESS:[[address] [place] [address], [address] [place] [address]]]

## 2018-07-23 ENCOUNTER — APPOINTMENT (OUTPATIENT)
Dept: GASTROENTEROLOGY | Facility: CLINIC | Age: 78
End: 2018-07-23

## 2018-07-24 ENCOUNTER — APPOINTMENT (OUTPATIENT)
Dept: TRAUMA SURGERY | Facility: CLINIC | Age: 78
End: 2018-07-24
Payer: MEDICARE

## 2018-07-24 VITALS
DIASTOLIC BLOOD PRESSURE: 88 MMHG | SYSTOLIC BLOOD PRESSURE: 157 MMHG | OXYGEN SATURATION: 98 % | BODY MASS INDEX: 23.19 KG/M2 | HEART RATE: 83 BPM | TEMPERATURE: 98.4 F | WEIGHT: 126.8 LBS

## 2018-07-24 PROCEDURE — 99213 OFFICE O/P EST LOW 20 MIN: CPT

## 2018-07-31 ENCOUNTER — OTHER (OUTPATIENT)
Age: 78
End: 2018-07-31

## 2018-08-08 ENCOUNTER — APPOINTMENT (OUTPATIENT)
Dept: GASTROENTEROLOGY | Facility: CLINIC | Age: 78
End: 2018-08-08
Payer: MEDICARE

## 2018-08-08 VITALS
RESPIRATION RATE: 16 BRPM | OXYGEN SATURATION: 96 % | HEART RATE: 88 BPM | WEIGHT: 127 LBS | BODY MASS INDEX: 23.37 KG/M2 | DIASTOLIC BLOOD PRESSURE: 86 MMHG | SYSTOLIC BLOOD PRESSURE: 142 MMHG | HEIGHT: 62 IN

## 2018-08-08 DIAGNOSIS — S22.000A WEDGE COMPRESSION FRACTURE OF UNSPECIFIED THORACIC VERTEBRA, INITIAL ENCOUNTER FOR CLOSED FRACTURE: ICD-10-CM

## 2018-08-08 DIAGNOSIS — M54.16 RADICULOPATHY, LUMBAR REGION: ICD-10-CM

## 2018-08-08 DIAGNOSIS — K59.00 CONSTIPATION, UNSPECIFIED: ICD-10-CM

## 2018-08-08 DIAGNOSIS — Z78.9 OTHER SPECIFIED HEALTH STATUS: ICD-10-CM

## 2018-08-08 DIAGNOSIS — Z86.79 PERSONAL HISTORY OF OTHER DISEASES OF THE CIRCULATORY SYSTEM: ICD-10-CM

## 2018-08-08 DIAGNOSIS — Z95.810 PRESENCE OF AUTOMATIC (IMPLANTABLE) CARDIAC DEFIBRILLATOR: ICD-10-CM

## 2018-08-08 DIAGNOSIS — R60.0 LOCALIZED EDEMA: ICD-10-CM

## 2018-08-08 DIAGNOSIS — K57.32 DIVERTICULITIS OF LARGE INTESTINE W/OUT PERFORATION OR ABSCESS W/OUT BLEEDING: ICD-10-CM

## 2018-08-08 DIAGNOSIS — Z87.891 PERSONAL HISTORY OF NICOTINE DEPENDENCE: ICD-10-CM

## 2018-08-08 DIAGNOSIS — Z86.010 PERSONAL HISTORY OF COLONIC POLYPS: ICD-10-CM

## 2018-08-08 DIAGNOSIS — Z60.2 PROBLEMS RELATED TO LIVING ALONE: ICD-10-CM

## 2018-08-08 PROCEDURE — 99204 OFFICE O/P NEW MOD 45 MIN: CPT

## 2018-08-08 RX ORDER — ASPIRIN 81 MG/1
81 TABLET ORAL
Refills: 0 | Status: ACTIVE | COMMUNITY

## 2018-08-08 RX ORDER — CARVEDILOL 3.12 MG/1
3.12 TABLET, FILM COATED ORAL
Refills: 0 | Status: ACTIVE | COMMUNITY

## 2018-08-08 RX ORDER — LOSARTAN POTASSIUM 25 MG/1
25 TABLET, FILM COATED ORAL
Refills: 0 | Status: ACTIVE | COMMUNITY

## 2018-08-08 RX ORDER — DOCUSATE SODIUM 100 MG/1
100 CAPSULE ORAL
Refills: 0 | Status: ACTIVE | COMMUNITY

## 2018-08-08 SDOH — SOCIAL STABILITY - SOCIAL INSECURITY: PROBLEMS RELATED TO LIVING ALONE: Z60.2

## 2018-08-19 ENCOUNTER — INPATIENT (INPATIENT)
Facility: HOSPITAL | Age: 78
LOS: 1 days | Discharge: HOME | DRG: 389 | End: 2018-08-21
Attending: SURGERY | Admitting: SURGERY
Payer: MEDICARE

## 2018-08-19 VITALS — HEIGHT: 62 IN | WEIGHT: 126.99 LBS

## 2018-08-19 DIAGNOSIS — Z90.49 ACQUIRED ABSENCE OF OTHER SPECIFIED PARTS OF DIGESTIVE TRACT: Chronic | ICD-10-CM

## 2018-08-19 DIAGNOSIS — Z98.890 OTHER SPECIFIED POSTPROCEDURAL STATES: Chronic | ICD-10-CM

## 2018-08-19 DIAGNOSIS — Z93.2 ILEOSTOMY STATUS: Chronic | ICD-10-CM

## 2018-08-19 DIAGNOSIS — Z87.898 PERSONAL HISTORY OF OTHER SPECIFIED CONDITIONS: Chronic | ICD-10-CM

## 2018-08-19 DIAGNOSIS — Z95.810 PRESENCE OF AUTOMATIC (IMPLANTABLE) CARDIAC DEFIBRILLATOR: Chronic | ICD-10-CM

## 2018-08-19 LAB
ALBUMIN SERPL ELPH-MCNC: 4.3 G/DL — SIGNIFICANT CHANGE UP (ref 3.3–5.2)
ALP SERPL-CCNC: 127 U/L — HIGH (ref 40–120)
ALT FLD-CCNC: 30 U/L — SIGNIFICANT CHANGE UP
ANION GAP SERPL CALC-SCNC: 14 MMOL/L — SIGNIFICANT CHANGE UP (ref 5–17)
APPEARANCE UR: CLEAR — SIGNIFICANT CHANGE UP
APTT BLD: 29.2 SEC — SIGNIFICANT CHANGE UP (ref 27.5–37.4)
AST SERPL-CCNC: 41 U/L — HIGH
BACTERIA # UR AUTO: ABNORMAL
BASOPHILS # BLD AUTO: 0 K/UL — SIGNIFICANT CHANGE UP (ref 0–0.2)
BASOPHILS NFR BLD AUTO: 0.1 % — SIGNIFICANT CHANGE UP (ref 0–2)
BILIRUB SERPL-MCNC: 0.5 MG/DL — SIGNIFICANT CHANGE UP (ref 0.4–2)
BILIRUB UR-MCNC: NEGATIVE — SIGNIFICANT CHANGE UP
BLD GP AB SCN SERPL QL: SIGNIFICANT CHANGE UP
BUN SERPL-MCNC: 17 MG/DL — SIGNIFICANT CHANGE UP (ref 8–20)
CALCIUM SERPL-MCNC: 10.3 MG/DL — HIGH (ref 8.6–10.2)
CHLORIDE SERPL-SCNC: 96 MMOL/L — LOW (ref 98–107)
CO2 SERPL-SCNC: 25 MMOL/L — SIGNIFICANT CHANGE UP (ref 22–29)
COLOR SPEC: YELLOW — SIGNIFICANT CHANGE UP
CREAT SERPL-MCNC: 0.77 MG/DL — SIGNIFICANT CHANGE UP (ref 0.5–1.3)
DIFF PNL FLD: ABNORMAL
EOSINOPHIL # BLD AUTO: 0 K/UL — SIGNIFICANT CHANGE UP (ref 0–0.5)
EOSINOPHIL NFR BLD AUTO: 0.6 % — SIGNIFICANT CHANGE UP (ref 0–6)
EPI CELLS # UR: SIGNIFICANT CHANGE UP
GLUCOSE SERPL-MCNC: 112 MG/DL — SIGNIFICANT CHANGE UP (ref 70–115)
GLUCOSE UR QL: NEGATIVE MG/DL — SIGNIFICANT CHANGE UP
HCT VFR BLD CALC: 38.9 % — SIGNIFICANT CHANGE UP (ref 37–47)
HGB BLD-MCNC: 12.8 G/DL — SIGNIFICANT CHANGE UP (ref 12–16)
INR BLD: 1.07 RATIO — SIGNIFICANT CHANGE UP (ref 0.88–1.16)
KETONES UR-MCNC: ABNORMAL
LACTATE BLDV-MCNC: 0.6 MMOL/L — SIGNIFICANT CHANGE UP (ref 0.5–2)
LEUKOCYTE ESTERASE UR-ACNC: ABNORMAL
LIDOCAIN IGE QN: 26 U/L — SIGNIFICANT CHANGE UP (ref 22–51)
LYMPHOCYTES # BLD AUTO: 1.3 K/UL — SIGNIFICANT CHANGE UP (ref 1–4.8)
LYMPHOCYTES # BLD AUTO: 15.9 % — LOW (ref 20–55)
MAGNESIUM SERPL-MCNC: 2.3 MG/DL — SIGNIFICANT CHANGE UP (ref 1.6–2.6)
MCHC RBC-ENTMCNC: 30.5 PG — SIGNIFICANT CHANGE UP (ref 27–31)
MCHC RBC-ENTMCNC: 32.9 G/DL — SIGNIFICANT CHANGE UP (ref 32–36)
MCV RBC AUTO: 92.6 FL — SIGNIFICANT CHANGE UP (ref 81–99)
MONOCYTES # BLD AUTO: 0.3 K/UL — SIGNIFICANT CHANGE UP (ref 0–0.8)
MONOCYTES NFR BLD AUTO: 4 % — SIGNIFICANT CHANGE UP (ref 3–10)
NEUTROPHILS # BLD AUTO: 6.5 K/UL — SIGNIFICANT CHANGE UP (ref 1.8–8)
NEUTROPHILS NFR BLD AUTO: 79.3 % — HIGH (ref 37–73)
NITRITE UR-MCNC: NEGATIVE — SIGNIFICANT CHANGE UP
PH UR: 5 — SIGNIFICANT CHANGE UP (ref 5–8)
PHOSPHATE SERPL-MCNC: 3.5 MG/DL — SIGNIFICANT CHANGE UP (ref 2.4–4.7)
PLATELET # BLD AUTO: 204 K/UL — SIGNIFICANT CHANGE UP (ref 150–400)
POTASSIUM SERPL-MCNC: 4.6 MMOL/L — SIGNIFICANT CHANGE UP (ref 3.5–5.3)
POTASSIUM SERPL-SCNC: 4.6 MMOL/L — SIGNIFICANT CHANGE UP (ref 3.5–5.3)
PROT SERPL-MCNC: 7.6 G/DL — SIGNIFICANT CHANGE UP (ref 6.6–8.7)
PROT UR-MCNC: 30 MG/DL
PROTHROM AB SERPL-ACNC: 11.8 SEC — SIGNIFICANT CHANGE UP (ref 9.8–12.7)
RBC # BLD: 4.2 M/UL — LOW (ref 4.4–5.2)
RBC # FLD: 13.9 % — SIGNIFICANT CHANGE UP (ref 11–15.6)
RBC CASTS # UR COMP ASSIST: ABNORMAL /HPF (ref 0–4)
SODIUM SERPL-SCNC: 135 MMOL/L — SIGNIFICANT CHANGE UP (ref 135–145)
SP GR SPEC: 1.03 — HIGH (ref 1.01–1.02)
TYPE + AB SCN PNL BLD: SIGNIFICANT CHANGE UP
UROBILINOGEN FLD QL: 1 MG/DL
WBC # BLD: 8.2 K/UL — SIGNIFICANT CHANGE UP (ref 4.8–10.8)
WBC # FLD AUTO: 8.2 K/UL — SIGNIFICANT CHANGE UP (ref 4.8–10.8)
WBC UR QL: SIGNIFICANT CHANGE UP

## 2018-08-19 PROCEDURE — 74176 CT ABD & PELVIS W/O CONTRAST: CPT | Mod: 26

## 2018-08-19 PROCEDURE — 99285 EMERGENCY DEPT VISIT HI MDM: CPT

## 2018-08-19 PROCEDURE — 93010 ELECTROCARDIOGRAM REPORT: CPT

## 2018-08-19 RX ORDER — ONDANSETRON 8 MG/1
4 TABLET, FILM COATED ORAL ONCE
Qty: 0 | Refills: 0 | Status: COMPLETED | OUTPATIENT
Start: 2018-08-19 | End: 2018-08-19

## 2018-08-19 RX ORDER — SODIUM CHLORIDE 9 MG/ML
1000 INJECTION INTRAMUSCULAR; INTRAVENOUS; SUBCUTANEOUS ONCE
Qty: 0 | Refills: 0 | Status: COMPLETED | OUTPATIENT
Start: 2018-08-19 | End: 2018-08-19

## 2018-08-19 RX ORDER — MORPHINE SULFATE 50 MG/1
4 CAPSULE, EXTENDED RELEASE ORAL ONCE
Qty: 0 | Refills: 0 | Status: DISCONTINUED | OUTPATIENT
Start: 2018-08-19 | End: 2018-08-19

## 2018-08-19 RX ADMIN — MORPHINE SULFATE 4 MILLIGRAM(S): 50 CAPSULE, EXTENDED RELEASE ORAL at 22:12

## 2018-08-19 RX ADMIN — SODIUM CHLORIDE 2000 MILLILITER(S): 9 INJECTION INTRAMUSCULAR; INTRAVENOUS; SUBCUTANEOUS at 22:12

## 2018-08-19 RX ADMIN — ONDANSETRON 4 MILLIGRAM(S): 8 TABLET, FILM COATED ORAL at 22:12

## 2018-08-19 RX ADMIN — MORPHINE SULFATE 4 MILLIGRAM(S): 50 CAPSULE, EXTENDED RELEASE ORAL at 23:45

## 2018-08-19 NOTE — ED PROVIDER NOTE - GASTROINTESTINAL, MLM
Abdomen soft, diffusely tender in all 4 quadrants, no guarding or rebound. Non bilious vomiting. Abdomen is mildly distended, diffusely tender in all 4 quadrants, no guarding or rebound. Non bilious vomiting.

## 2018-08-19 NOTE — ED ADULT NURSE NOTE - NSIMPLEMENTINTERV_GEN_ALL_ED
Implemented All Fall Risk Interventions:  [place] to call system. Call bell, personal items and telephone within reach. Instruct patient to call for assistance. Room bathroom lighting operational. Non-slip footwear when patient is off stretcher. Physically safe environment: no spills, clutter or unnecessary equipment. Stretcher in lowest position, wheels locked, appropriate side rails in place. Provide visual cue, wrist band, yellow gown, etc. Monitor gait and stability. Monitor for mental status changes and reorient to person, place, and time. Review medications for side effects contributing to fall risk. Reinforce activity limits and safety measures with patient and family.

## 2018-08-19 NOTE — ED PROVIDER NOTE - OBJECTIVE STATEMENT
79 y/o F with PMHx of breast CA, cardiomyopathy, HTN, valvular heart disease, multiple bowel obstructions and an ileostomy (reversed in February) presents to ED c/o sudden onset of burning epigastric pain with associated burning in the throat, nausea and vomiting onset today. She is unable to keep down any PO due to current symptoms. Patient states she had a small bowel obstruction about 2 weeks ago, requiring an NG tube. Also notes she has been to ED multiple times for similar symptoms, and has had over 20 abdominal CT scans. denies fever. denies HA or neck pain. no chest pain, cough or sob. no urinary f/u/d. no back pain. no motor or sensory deficits. denies illicit drug use. no recent travel. no rash. no other acute issues symptoms or concerns. 79 y/o F with PMHx of breast CA, cardiomyopathy, HTN, valvular heart disease, multiple bowel obstructions and an ileostomy (reversed in February) presents to ED c/o sudden onset of burning epigastric pain with associated burning in the throat, nausea and vomiting onset today. She is unable to keep down any PO due to current symptoms. Patient states she had a small bowel obstruction about 2 weeks ago, requiring an NG tube. Also notes she has been to ED multiple times for similar symptoms, and has had over 20 abdominal CT scans, and is allergic to IV contrast. denies fever. denies HA or neck pain. no chest pain, cough or sob. no urinary f/u/d. no back pain. no motor or sensory deficits. denies illicit drug use. no recent travel. no rash. no other acute issues symptoms or concerns.

## 2018-08-19 NOTE — ED ADULT TRIAGE NOTE - CHIEF COMPLAINT QUOTE
Pt walkin with c/o sudden onset epigastric pain, burning in throat, and vomiting. Pt had a SBO 2 weeks ago requiring an NG tube.

## 2018-08-20 DIAGNOSIS — Z90.49 ACQUIRED ABSENCE OF OTHER SPECIFIED PARTS OF DIGESTIVE TRACT: Chronic | ICD-10-CM

## 2018-08-20 DIAGNOSIS — K56.609 UNSPECIFIED INTESTINAL OBSTRUCTION, UNSPECIFIED AS TO PARTIAL VERSUS COMPLETE OBSTRUCTION: ICD-10-CM

## 2018-08-20 LAB
ANION GAP SERPL CALC-SCNC: 10 MMOL/L — SIGNIFICANT CHANGE UP (ref 5–17)
BASOPHILS # BLD AUTO: 0 K/UL — SIGNIFICANT CHANGE UP (ref 0–0.2)
BASOPHILS NFR BLD AUTO: 0.4 % — SIGNIFICANT CHANGE UP (ref 0–2)
BUN SERPL-MCNC: 13 MG/DL — SIGNIFICANT CHANGE UP (ref 8–20)
CALCIUM SERPL-MCNC: 9 MG/DL — SIGNIFICANT CHANGE UP (ref 8.6–10.2)
CHLORIDE SERPL-SCNC: 106 MMOL/L — SIGNIFICANT CHANGE UP (ref 98–107)
CO2 SERPL-SCNC: 23 MMOL/L — SIGNIFICANT CHANGE UP (ref 22–29)
CREAT SERPL-MCNC: 0.69 MG/DL — SIGNIFICANT CHANGE UP (ref 0.5–1.3)
EOSINOPHIL # BLD AUTO: 0.1 K/UL — SIGNIFICANT CHANGE UP (ref 0–0.5)
EOSINOPHIL NFR BLD AUTO: 1.2 % — SIGNIFICANT CHANGE UP (ref 0–6)
GLUCOSE SERPL-MCNC: 101 MG/DL — SIGNIFICANT CHANGE UP (ref 70–115)
HCT VFR BLD CALC: 33.8 % — LOW (ref 37–47)
HGB BLD-MCNC: 10.9 G/DL — LOW (ref 12–16)
LYMPHOCYTES # BLD AUTO: 1.4 K/UL — SIGNIFICANT CHANGE UP (ref 1–4.8)
LYMPHOCYTES # BLD AUTO: 28.8 % — SIGNIFICANT CHANGE UP (ref 20–55)
MAGNESIUM SERPL-MCNC: 2.2 MG/DL — SIGNIFICANT CHANGE UP (ref 1.6–2.6)
MCHC RBC-ENTMCNC: 29.9 PG — SIGNIFICANT CHANGE UP (ref 27–31)
MCHC RBC-ENTMCNC: 32.2 G/DL — SIGNIFICANT CHANGE UP (ref 32–36)
MCV RBC AUTO: 92.9 FL — SIGNIFICANT CHANGE UP (ref 81–99)
MONOCYTES # BLD AUTO: 0.4 K/UL — SIGNIFICANT CHANGE UP (ref 0–0.8)
MONOCYTES NFR BLD AUTO: 7.6 % — SIGNIFICANT CHANGE UP (ref 3–10)
NEUTROPHILS # BLD AUTO: 3.1 K/UL — SIGNIFICANT CHANGE UP (ref 1.8–8)
NEUTROPHILS NFR BLD AUTO: 61.8 % — SIGNIFICANT CHANGE UP (ref 37–73)
PHOSPHATE SERPL-MCNC: 3.1 MG/DL — SIGNIFICANT CHANGE UP (ref 2.4–4.7)
PLATELET # BLD AUTO: 185 K/UL — SIGNIFICANT CHANGE UP (ref 150–400)
POTASSIUM SERPL-MCNC: 5.2 MMOL/L — SIGNIFICANT CHANGE UP (ref 3.5–5.3)
POTASSIUM SERPL-SCNC: 5.2 MMOL/L — SIGNIFICANT CHANGE UP (ref 3.5–5.3)
RBC # BLD: 3.64 M/UL — LOW (ref 4.4–5.2)
RBC # FLD: 13.9 % — SIGNIFICANT CHANGE UP (ref 11–15.6)
SODIUM SERPL-SCNC: 139 MMOL/L — SIGNIFICANT CHANGE UP (ref 135–145)
WBC # BLD: 5 K/UL — SIGNIFICANT CHANGE UP (ref 4.8–10.8)
WBC # FLD AUTO: 5 K/UL — SIGNIFICANT CHANGE UP (ref 4.8–10.8)

## 2018-08-20 PROCEDURE — 74018 RADEX ABDOMEN 1 VIEW: CPT | Mod: 26

## 2018-08-20 RX ORDER — ENOXAPARIN SODIUM 100 MG/ML
40 INJECTION SUBCUTANEOUS EVERY 24 HOURS
Qty: 0 | Refills: 0 | Status: DISCONTINUED | OUTPATIENT
Start: 2018-08-20 | End: 2018-08-21

## 2018-08-20 RX ORDER — ONDANSETRON 8 MG/1
4 TABLET, FILM COATED ORAL EVERY 8 HOURS
Qty: 0 | Refills: 0 | Status: DISCONTINUED | OUTPATIENT
Start: 2018-08-20 | End: 2018-08-21

## 2018-08-20 RX ORDER — SODIUM CHLORIDE 9 MG/ML
1000 INJECTION, SOLUTION INTRAVENOUS
Qty: 0 | Refills: 0 | Status: DISCONTINUED | OUTPATIENT
Start: 2018-08-20 | End: 2018-08-21

## 2018-08-20 RX ORDER — ASPIRIN/CALCIUM CARB/MAGNESIUM 324 MG
1 TABLET ORAL
Qty: 0 | Refills: 0 | COMMUNITY

## 2018-08-20 RX ORDER — ACETAMINOPHEN 500 MG
1000 TABLET ORAL ONCE
Qty: 0 | Refills: 0 | Status: DISCONTINUED | OUTPATIENT
Start: 2018-08-20 | End: 2018-08-21

## 2018-08-20 RX ORDER — SENNA PLUS 8.6 MG/1
2 TABLET ORAL
Qty: 0 | Refills: 0 | COMMUNITY

## 2018-08-20 RX ORDER — DOCUSATE SODIUM 100 MG
1 CAPSULE ORAL
Qty: 0 | Refills: 0 | COMMUNITY

## 2018-08-20 RX ORDER — ASPIRIN/CALCIUM CARB/MAGNESIUM 324 MG
81 TABLET ORAL DAILY
Qty: 0 | Refills: 0 | Status: DISCONTINUED | OUTPATIENT
Start: 2018-08-20 | End: 2018-08-20

## 2018-08-20 RX ADMIN — ONDANSETRON 4 MILLIGRAM(S): 8 TABLET, FILM COATED ORAL at 04:16

## 2018-08-20 RX ADMIN — SODIUM CHLORIDE 100 MILLILITER(S): 9 INJECTION, SOLUTION INTRAVENOUS at 05:46

## 2018-08-20 RX ADMIN — ENOXAPARIN SODIUM 40 MILLIGRAM(S): 100 INJECTION SUBCUTANEOUS at 05:46

## 2018-08-20 NOTE — H&P ADULT - PSH
AICD (automatic cardioverter/defibrillator) present    H/O bilateral oophorectomy    H/O lymphadenopathy    S/P appendectomy    S/P colon resection    S/P ileostomy    S/P lumpectomy, right breast

## 2018-08-20 NOTE — H&P ADULT - HISTORY OF PRESENT ILLNESS
77 y/o F h/o HTN, recent loop ileostomy reversal on 2/22/18 after perforated diverticulitis w/ resection and Luis Miguel's  with abdominal pain, nausea, and vomiting since yesterday morning. Patient states she has not had a bowel movement in 5 days. She was recently prescribed narcotic pain medication for sciatic nerve pain by PCP, and believes this may be the cause of her constipation. No dysuria. No fever, chills, chest pain, dyspnea. 77 y/o F h/o HTN, recent loop ileostomy reversal on 2/22/18 after perforated diverticulitis w/ sigmoid resection and ileostomy (7/2017) presenting with abdominal pain, nausea and vomiting for the past 24 hours. She is having bowel movements daily, which are soft and passing occasional flatus. She was recently admitted with similar symptoms and discharged with bowel regiment. On prior admission, patient refused surgical intervention and was managed conservatively. Denies F/C/CP/SOB. No dysuria. No fever, chills, chest pain, dyspnea.

## 2018-08-20 NOTE — H&P ADULT - NSHPPHYSICALEXAM_GEN_ALL_CORE
GENERAL: Alert, well developed, in no acute distress.  MENTAL STATUS: AAOx3. Appropriate affect.  HEENT: PERRLA. EOMI. MMM.  Trachea midline. No lymph node swelling or tenderness.  RESPIRATORY: CTAB. No wheezing, rales or rhonchi.  CARDIOVASCULAR: RRR. No audible murmurs, rubs or gallops.   GASTROINTESTINAL: Well healed midline incision wound. Abdomen soft, Mild TTP of the lower abdomen, Mild distension, -Rebound, Voluntary guarding.  No pulsatile mass, no flank tenderness or suprapubic tenderness. No hepatosplenomegaly.  NEUROLOGIC: Cranial nerves II-XII grossly intact. No focal neurological deficits. Moves all extremities spontaneously. Sensation intact bilaterally.  INTEGUMENTARY: No overt rashes or lesions, petechia or purpura. Good turgor. No edema.  MUSCULOSKELETAL: No cyanosis or clubbing. No gross deformities.   LYMPHATIC: Palpation of neck reveals no swelling or tenderness of neck nodes. Palpation of groin reveals no swelling or tenderness of groin nodes.

## 2018-08-20 NOTE — CHART NOTE - NSCHARTNOTEFT_GEN_A_CORE
ACS Note    Patient was placed on a NGT clamp trial at 2:30pm. At 7pm, patient was reassessed at bedside. She had multiple flatus and reports multiple episodes of loose BM's. Patient denies nausea and vomiting at this time. Her physical exam shows an unremarkable abdominal exam: soft, non-distended, non-tender to palpation. NGT was placed on suction once again and <20cc's of fluid came out. Pt's NGT was removed at this time.    A/P  - CLD, continue IVF  - f/u diet tolerance  - pain control  - continue to monitor bowel function

## 2018-08-20 NOTE — H&P ADULT - ASSESSMENT
77 y/o F h/o HTN, recent loop ileostomy reversal on 2/22/18 after perforated diverticulitis w/ sigmoid resection and ileostomy (7/2017) presenting with abdominal pain, nausea and vomiting for the past 24 hours.  -Admit to [facility], Dr. Hamm, [facility]  -NPO w/ IVF  -NGT placement, then to LC[facility]  -Pradhan catheter  -Home meds  -OOB, ambulate, IS use  -DVT PPX: SQH  Dispo: inpatient admission

## 2018-08-20 NOTE — H&P ADULT - ATTENDING COMMENTS
The patient was seen and examined  Details per the resident's H&P  This is a 78-year old woman who presents to the ED with one day of abdominal pain  The patient had a ileostomy reversal on [id number]  She had one recent admission for intestinal obstruction  The abdominal pain was associated with nausea and vomiting    Exam:  Afebrile  Abdomen is softly distended.  Wound is healing well    Labs:  WBC=8.2    CT images reviewed    Impression:  Intestinal obstruction    Plan:  Admit  Bowel rest  NGT  Serial abdominal exams  DVT prophylaxis

## 2018-08-21 ENCOUNTER — TRANSCRIPTION ENCOUNTER (OUTPATIENT)
Age: 78
End: 2018-08-21

## 2018-08-21 VITALS
DIASTOLIC BLOOD PRESSURE: 52 MMHG | SYSTOLIC BLOOD PRESSURE: 107 MMHG | HEART RATE: 69 BPM | TEMPERATURE: 99 F | OXYGEN SATURATION: 99 % | RESPIRATION RATE: 18 BRPM

## 2018-08-21 LAB
ANION GAP SERPL CALC-SCNC: 10 MMOL/L — SIGNIFICANT CHANGE UP (ref 5–17)
BASOPHILS # BLD AUTO: 0 K/UL — SIGNIFICANT CHANGE UP (ref 0–0.2)
BASOPHILS NFR BLD AUTO: 0.7 % — SIGNIFICANT CHANGE UP (ref 0–2)
BUN SERPL-MCNC: 8 MG/DL — SIGNIFICANT CHANGE UP (ref 8–20)
CALCIUM SERPL-MCNC: 9.4 MG/DL — SIGNIFICANT CHANGE UP (ref 8.6–10.2)
CHLORIDE SERPL-SCNC: 105 MMOL/L — SIGNIFICANT CHANGE UP (ref 98–107)
CO2 SERPL-SCNC: 25 MMOL/L — SIGNIFICANT CHANGE UP (ref 22–29)
CREAT SERPL-MCNC: 0.58 MG/DL — SIGNIFICANT CHANGE UP (ref 0.5–1.3)
CULTURE RESULTS: SIGNIFICANT CHANGE UP
EOSINOPHIL # BLD AUTO: 0.1 K/UL — SIGNIFICANT CHANGE UP (ref 0–0.5)
EOSINOPHIL NFR BLD AUTO: 1.9 % — SIGNIFICANT CHANGE UP (ref 0–6)
GLUCOSE SERPL-MCNC: 85 MG/DL — SIGNIFICANT CHANGE UP (ref 70–115)
HCT VFR BLD CALC: 32.4 % — LOW (ref 37–47)
HGB BLD-MCNC: 10.3 G/DL — LOW (ref 12–16)
LYMPHOCYTES # BLD AUTO: 1.2 K/UL — SIGNIFICANT CHANGE UP (ref 1–4.8)
LYMPHOCYTES # BLD AUTO: 27.7 % — SIGNIFICANT CHANGE UP (ref 20–55)
MAGNESIUM SERPL-MCNC: 2 MG/DL — SIGNIFICANT CHANGE UP (ref 1.6–2.6)
MCHC RBC-ENTMCNC: 29.7 PG — SIGNIFICANT CHANGE UP (ref 27–31)
MCHC RBC-ENTMCNC: 31.8 G/DL — LOW (ref 32–36)
MCV RBC AUTO: 93.4 FL — SIGNIFICANT CHANGE UP (ref 81–99)
MONOCYTES # BLD AUTO: 0.3 K/UL — SIGNIFICANT CHANGE UP (ref 0–0.8)
MONOCYTES NFR BLD AUTO: 6.4 % — SIGNIFICANT CHANGE UP (ref 3–10)
NEUTROPHILS # BLD AUTO: 2.7 K/UL — SIGNIFICANT CHANGE UP (ref 1.8–8)
NEUTROPHILS NFR BLD AUTO: 63.3 % — SIGNIFICANT CHANGE UP (ref 37–73)
PHOSPHATE SERPL-MCNC: 2.6 MG/DL — SIGNIFICANT CHANGE UP (ref 2.4–4.7)
PLATELET # BLD AUTO: 165 K/UL — SIGNIFICANT CHANGE UP (ref 150–400)
POTASSIUM SERPL-MCNC: 4.3 MMOL/L — SIGNIFICANT CHANGE UP (ref 3.5–5.3)
POTASSIUM SERPL-SCNC: 4.3 MMOL/L — SIGNIFICANT CHANGE UP (ref 3.5–5.3)
RBC # BLD: 3.47 M/UL — LOW (ref 4.4–5.2)
RBC # FLD: 13.8 % — SIGNIFICANT CHANGE UP (ref 11–15.6)
SODIUM SERPL-SCNC: 140 MMOL/L — SIGNIFICANT CHANGE UP (ref 135–145)
SPECIMEN SOURCE: SIGNIFICANT CHANGE UP
WBC # BLD: 4.2 K/UL — LOW (ref 4.8–10.8)
WBC # FLD AUTO: 4.2 K/UL — LOW (ref 4.8–10.8)

## 2018-08-21 RX ORDER — ACETAMINOPHEN 500 MG
650 TABLET ORAL EVERY 6 HOURS
Qty: 0 | Refills: 0 | Status: DISCONTINUED | OUTPATIENT
Start: 2018-08-21 | End: 2018-08-21

## 2018-08-21 RX ORDER — DOCUSATE SODIUM 100 MG
1 CAPSULE ORAL
Qty: 0 | Refills: 0 | COMMUNITY

## 2018-08-21 RX ORDER — SENNA PLUS 8.6 MG/1
2 TABLET ORAL
Qty: 0 | Refills: 0 | COMMUNITY

## 2018-08-21 RX ORDER — CARVEDILOL PHOSPHATE 80 MG/1
1 CAPSULE, EXTENDED RELEASE ORAL
Qty: 0 | Refills: 0 | COMMUNITY

## 2018-08-21 RX ORDER — LOSARTAN POTASSIUM 100 MG/1
25 TABLET, FILM COATED ORAL DAILY
Qty: 0 | Refills: 0 | Status: DISCONTINUED | OUTPATIENT
Start: 2018-08-21 | End: 2018-08-21

## 2018-08-21 RX ORDER — DOCUSATE SODIUM 100 MG
100 CAPSULE ORAL THREE TIMES A DAY
Qty: 0 | Refills: 0 | Status: DISCONTINUED | OUTPATIENT
Start: 2018-08-21 | End: 2018-08-21

## 2018-08-21 RX ORDER — LOSARTAN POTASSIUM 100 MG/1
25 TABLET, FILM COATED ORAL
Qty: 0 | Refills: 0 | COMMUNITY

## 2018-08-21 RX ORDER — CARVEDILOL PHOSPHATE 80 MG/1
3.12 CAPSULE, EXTENDED RELEASE ORAL EVERY 12 HOURS
Qty: 0 | Refills: 0 | Status: DISCONTINUED | OUTPATIENT
Start: 2018-08-21 | End: 2018-08-21

## 2018-08-21 RX ORDER — SENNA PLUS 8.6 MG/1
2 TABLET ORAL AT BEDTIME
Qty: 0 | Refills: 0 | Status: DISCONTINUED | OUTPATIENT
Start: 2018-08-21 | End: 2018-08-21

## 2018-08-21 RX ADMIN — ENOXAPARIN SODIUM 40 MILLIGRAM(S): 100 INJECTION SUBCUTANEOUS at 05:29

## 2018-08-21 RX ADMIN — LOSARTAN POTASSIUM 25 MILLIGRAM(S): 100 TABLET, FILM COATED ORAL at 05:29

## 2018-08-21 RX ADMIN — Medication 100 MILLIGRAM(S): at 11:35

## 2018-08-21 RX ADMIN — CARVEDILOL PHOSPHATE 3.12 MILLIGRAM(S): 80 CAPSULE, EXTENDED RELEASE ORAL at 05:29

## 2018-08-21 NOTE — DISCHARGE NOTE ADULT - PLAN OF CARE
to be symptom free and return to daily activities of living Please followup with the acute care surgery outpatient clinic in 7-10 days. If you should have any change in clinical status or symptoms should re-develop please return to the emergency room as soon as possible. Continue all medications as previously prescribed by your prescribing doctor.

## 2018-08-21 NOTE — DISCHARGE NOTE ADULT - CARE PLAN
Principal Discharge DX:	SBO (small bowel obstruction)  Goal:	to be symptom free and return to daily activities of living  Assessment and plan of treatment:	Please followup with the acute care surgery outpatient clinic in 7-10 days. If you should have any change in clinical status or symptoms should re-develop please return to the emergency room as soon as possible. Continue all medications as previously prescribed by your prescribing doctor.

## 2018-08-21 NOTE — PROGRESS NOTE ADULT - ATTENDING COMMENTS
resolved SBO. Tolerating diet this morning. Serial abodminal examinations for today.  Plan to dischrage this evening to in AM if she continues to have bowel function and tolerates diet. abdomen soft and nontedner this morning.

## 2018-08-21 NOTE — DISCHARGE NOTE ADULT - MEDICATION SUMMARY - MEDICATIONS TO TAKE
I will START or STAY ON the medications listed below when I get home from the hospital:    acetaminophen 325 mg oral tablet  -- 2 tab(s) by mouth every 6 hours, As needed, Mild Pain (1 - 3)  -- Indication: For home med     Aspirin Enteric Coated 81 mg oral delayed release tablet  -- 1 tab(s) by mouth once a day  -- Indication: For home med     naproxen  -- Indication: For home med     losartan  -- 25 milligram(s) by mouth once a day  -- Indication: For home med     Coreg 3.125 mg oral tablet  -- 1 tab(s) by mouth 2 times a day  -- Indication: For home med     senna oral tablet  -- 2 tab(s) by mouth once a day  -- Indication: For home med     Colace 50 mg oral capsule  -- 1 cap(s) by mouth 2 times a day  -- Indication: For home med I will START or STAY ON the medications listed below when I get home from the hospital:    acetaminophen 325 mg oral tablet  -- 2 tab(s) by mouth every 6 hours, As needed, Mild Pain (1 - 3)  -- Indication: For Diverticulitis    Aspirin Enteric Coated 81 mg oral delayed release tablet  -- 1 tab(s) by mouth once a day  -- Indication: For Diverticulitis    naproxen  -- Indication: For Diverticulitis    losartan  -- 25 milligram(s) by mouth once a day  -- Indication: For Diverticulitis    Coreg 3.125 mg oral tablet  -- 1 tab(s) by mouth 2 times a day  -- Indication: For Diverticulitis    senna oral tablet  -- 2 tab(s) by mouth once a day  -- Indication: For Diverticulitis    Colace 50 mg oral capsule  -- 1 cap(s) by mouth 2 times a day  -- Indication: For Diverticulitis

## 2018-08-21 NOTE — DISCHARGE NOTE ADULT - MEDICATION SUMMARY - MEDICATIONS TO STOP TAKING
I will STOP taking the medications listed below when I get home from the hospital:    Macrobid 100 mg oral capsule  -- 1 cap(s) by mouth 2 times a day   -- Finish all this medication unless otherwise directed by prescriber.  May discolor urine or feces.  Take with food or milk. I will STOP taking the medications listed below when I get home from the hospital:  None

## 2018-08-21 NOTE — PROGRESS NOTE ADULT - ASSESSMENT
78yoF with loop ileostomy reversal on 2/22/18 after perf diverticulitis w/ sigmoid resection and ileostomy 7/2017, who presented with abdominal pain, nausea, and vomiting. Her symptoms have since resolved.   - Advance to regular diet  - Restarted on home meds  - OOB, ambulate, IS use  - DVT PPx: SQH  - dispo: pending

## 2018-08-21 NOTE — DISCHARGE NOTE ADULT - CARE PROVIDER_API CALL
acute care surgery outpatient clinic,   [address] [address]  Phone: [phone]  Fax: (   )    -

## 2018-08-21 NOTE — DISCHARGE NOTE ADULT - HOSPITAL COURSE
79 y/o F h/o HTN, recent loop ileostomy reversal on 2/22/18 after perforated diverticulitis w/ sigmoid resection and ileostomy (7/2017) presenting with abdominal pain, nausea and vomiting for the past 24 hours. She is having bowel movements daily, which are soft and passing occasional flatus. She was recently admitted with similar symptoms and discharged with bowel regiment. On prior admission, patient refused surgical intervention and was managed conservatively. Denies F/C/CP/SOB. No dysuria. No fever, chills, chest pain, dyspnea. Patient had a ct of the abdomen that showed Small bowel obstruction to the level of a small bowel anastomosis within the right miky-abdomen. Patient was admitted to the acute care surgery service and made NPO, IV fluids, with serial abdominal exams. Patient did well with conservative management, abdominal pain resolved, bowel function returned, n/v resolved. Patient was started on clears and tolerated well, advanced with no issues. Patient will be discharged home and followup with ACS as an outpatient, she will continue all home medications as previously prescribed, and advance diet and activity as tolerated.

## 2018-08-21 NOTE — PROGRESS NOTE ADULT - SUBJECTIVE AND OBJECTIVE BOX
HPI/OVERNIGHT EVENTS:  No acute events overnight. Patient was having flatus and multiple bowel movements. NGT was removed and patient patient was started on CLD last night. She tolerated CLD. She denied having f/c/n/v/cp/sob. Patient had adequate urine output.       MEDICATIONS  (STANDING):  carvedilol 3.125 milliGRAM(s) Oral every 12 hours  docusate sodium 100 milliGRAM(s) Oral three times a day  enoxaparin Injectable 40 milliGRAM(s) SubCutaneous every 24 hours  losartan 25 milliGRAM(s) Oral daily  senna 2 Tablet(s) Oral at bedtime    MEDICATIONS  (PRN):  acetaminophen   Tablet. 650 milliGRAM(s) Oral every 6 hours PRN Mild Pain (1 - 3)  ondansetron Injectable 4 milliGRAM(s) IV Push every 8 hours PRN Nausea and/or Vomiting      Vital Signs Last 24 Hrs  T(C): 36.4 (21 Aug 2018 04:15), Max: 36.8 (20 Aug 2018 23:47)  T(F): 97.5 (21 Aug 2018 04:15), Max: 98.3 (20 Aug 2018 23:47)  HR: 64 (21 Aug 2018 04:15) (57 - 65)  BP: 132/60 (21 Aug 2018 04:15) (120/64 - 132/60)  BP(mean): --  RR: 18 (21 Aug 2018 04:15) (18 - 18)  SpO2: 96% (21 Aug 2018 04:15) (95% - 99%)    gen: nad A&Ox3  cv: rrr  resp: nonlabored breathing  abd: soft, nd, nttp.   msk: no c/c/e      I&O's Detail    20 Aug 2018 07:01  -  21 Aug 2018 07:00  --------------------------------------------------------  IN:  Total IN: 0 mL    OUT:    Nasoenteral Tube: 300 mL    Voided: 1100 mL  Total OUT: 1400 mL    Total NET: -1400 mL          LABS:                        10.3   4.2   )-----------( 165      ( 21 Aug 2018 07:02 )             32.4     08-    140  |  105  |  8.0  ----------------------------<  85  4.3   |  25.0  |  0.58    Ca    9.4      21 Aug 2018 07:02  Phos  2.6       Mg     2.0         TPro  7.6  /  Alb  4.3  /  TBili  0.5  /  DBili  x   /  AST  41<H>  /  ALT  30  /  AlkPhos  127<H>      PT/INR - ( 19 Aug 2018 22:26 )   PT: 11.8 sec;   INR: 1.07 ratio         PTT - ( 19 Aug 2018 22:26 )  PTT:29.2 sec  Urinalysis Basic - ( 19 Aug 2018 22:27 )    Color: Yellow / Appearance: Clear / S.030 / pH: x  Gluc: x / Ketone: Trace  / Bili: Negative / Urobili: 1 mg/dL   Blood: x / Protein: 30 mg/dL / Nitrite: Negative   Leuk Esterase: Trace / RBC: 6-10 /HPF / WBC 3-5   Sq Epi: x / Non Sq Epi: Occasional / Bacteria: Few

## 2018-08-21 NOTE — DISCHARGE NOTE ADULT - PATIENT PORTAL LINK FT
You can access the [facility] Patient Portal, offered by [facility], by registering with the following website: [email or web address]

## 2018-08-27 PROBLEM — K57.92 DIVERTICULITIS OF INTESTINE, PART UNSPECIFIED, WITHOUT PERFORATION OR ABSCESS WITHOUT BLEEDING: Chronic | Status: ACTIVE | Noted: 2018-08-20

## 2018-08-28 ENCOUNTER — APPOINTMENT (OUTPATIENT)
Dept: COLORECTAL SURGERY | Facility: CLINIC | Age: 78
End: 2018-08-28
Payer: MEDICARE

## 2018-08-28 DIAGNOSIS — K56.609 UNSPECIFIED INTESTINAL OBSTRUCTION, UNSPECIFIED AS TO PARTIAL VERSUS COMPLETE OBSTRUCTION: ICD-10-CM

## 2018-08-28 DIAGNOSIS — K66.0 PERITONEAL ADHESIONS (POSTPROCEDURAL) (POSTINFECTION): ICD-10-CM

## 2018-08-28 PROCEDURE — 99205 OFFICE O/P NEW HI 60 MIN: CPT

## 2018-08-29 ENCOUNTER — APPOINTMENT (OUTPATIENT)
Dept: ORTHOPEDIC SURGERY | Facility: CLINIC | Age: 78
End: 2018-08-29
Payer: MEDICARE

## 2018-08-29 VITALS
WEIGHT: 127 LBS | BODY MASS INDEX: 23.37 KG/M2 | HEIGHT: 62 IN | DIASTOLIC BLOOD PRESSURE: 69 MMHG | SYSTOLIC BLOOD PRESSURE: 132 MMHG | HEART RATE: 75 BPM

## 2018-08-29 DIAGNOSIS — S22.009A UNSPECIFIED FRACTURE OF UNSPECIFIED THORACIC VERTEBRA, INITIAL ENCOUNTER FOR CLOSED FRACTURE: ICD-10-CM

## 2018-08-29 DIAGNOSIS — M81.0 AGE-RELATED OSTEOPOROSIS W/OUT CURRENT PATHOLOGICAL FRACTURE: ICD-10-CM

## 2018-08-29 PROCEDURE — 99024 POSTOP FOLLOW-UP VISIT: CPT

## 2018-08-29 PROCEDURE — 72070 X-RAY EXAM THORAC SPINE 2VWS: CPT

## 2018-08-31 PROBLEM — K56.609 SBO (SMALL BOWEL OBSTRUCTION): Status: ACTIVE | Noted: 2018-08-06

## 2018-08-31 PROBLEM — K66.0 ABDOMINAL ADHESIONS: Status: ACTIVE | Noted: 2018-08-31

## 2018-09-10 RX ORDER — ACETAMINOPHEN 500 MG
650 TABLET ORAL EVERY 6 HOURS
Qty: 0 | Refills: 0 | Status: DISCONTINUED | OUTPATIENT
Start: 2018-09-12 | End: 2018-09-15

## 2018-09-10 RX ORDER — HEPARIN SODIUM 5000 [USP'U]/ML
5000 INJECTION INTRAVENOUS; SUBCUTANEOUS EVERY 12 HOURS
Qty: 0 | Refills: 0 | Status: DISCONTINUED | OUTPATIENT
Start: 2018-09-12 | End: 2018-09-15

## 2018-09-10 RX ORDER — ONDANSETRON 8 MG/1
4 TABLET, FILM COATED ORAL EVERY 6 HOURS
Qty: 0 | Refills: 0 | Status: DISCONTINUED | OUTPATIENT
Start: 2018-09-12 | End: 2018-09-15

## 2018-09-12 ENCOUNTER — INPATIENT (INPATIENT)
Facility: HOSPITAL | Age: 78
LOS: 10 days | Discharge: HOME | End: 2018-09-23
Attending: SURGERY | Admitting: SURGERY
Payer: MEDICARE

## 2018-09-12 VITALS
RESPIRATION RATE: 16 BRPM | TEMPERATURE: 98 F | OXYGEN SATURATION: 97 % | DIASTOLIC BLOOD PRESSURE: 68 MMHG | HEART RATE: 70 BPM | SYSTOLIC BLOOD PRESSURE: 143 MMHG

## 2018-09-12 DIAGNOSIS — Z87.898 PERSONAL HISTORY OF OTHER SPECIFIED CONDITIONS: Chronic | ICD-10-CM

## 2018-09-12 DIAGNOSIS — Z93.2 ILEOSTOMY STATUS: Chronic | ICD-10-CM

## 2018-09-12 DIAGNOSIS — Z95.810 PRESENCE OF AUTOMATIC (IMPLANTABLE) CARDIAC DEFIBRILLATOR: Chronic | ICD-10-CM

## 2018-09-12 DIAGNOSIS — R10.84 GENERALIZED ABDOMINAL PAIN: ICD-10-CM

## 2018-09-12 DIAGNOSIS — Z90.49 ACQUIRED ABSENCE OF OTHER SPECIFIED PARTS OF DIGESTIVE TRACT: Chronic | ICD-10-CM

## 2018-09-12 DIAGNOSIS — Z98.890 OTHER SPECIFIED POSTPROCEDURAL STATES: Chronic | ICD-10-CM

## 2018-09-12 LAB
ANION GAP SERPL CALC-SCNC: 5 MMOL/L — SIGNIFICANT CHANGE UP (ref 5–17)
APTT BLD: 30.4 SEC — SIGNIFICANT CHANGE UP (ref 27.5–37.4)
BASOPHILS # BLD AUTO: 0.03 K/UL — SIGNIFICANT CHANGE UP (ref 0–0.2)
BASOPHILS NFR BLD AUTO: 0.5 % — SIGNIFICANT CHANGE UP (ref 0–2)
BUN SERPL-MCNC: 11 MG/DL — SIGNIFICANT CHANGE UP (ref 7–23)
CALCIUM SERPL-MCNC: 9.3 MG/DL — SIGNIFICANT CHANGE UP (ref 8.5–10.1)
CHLORIDE SERPL-SCNC: 107 MMOL/L — SIGNIFICANT CHANGE UP (ref 96–108)
CO2 SERPL-SCNC: 30 MMOL/L — SIGNIFICANT CHANGE UP (ref 22–31)
CREAT SERPL-MCNC: 0.64 MG/DL — SIGNIFICANT CHANGE UP (ref 0.5–1.3)
EOSINOPHIL # BLD AUTO: 0.05 K/UL — SIGNIFICANT CHANGE UP (ref 0–0.5)
EOSINOPHIL NFR BLD AUTO: 0.9 % — SIGNIFICANT CHANGE UP (ref 0–6)
GLUCOSE SERPL-MCNC: 93 MG/DL — SIGNIFICANT CHANGE UP (ref 70–99)
HCT VFR BLD CALC: 34.2 % — LOW (ref 34.5–45)
HGB BLD-MCNC: 11.4 G/DL — LOW (ref 11.5–15.5)
IMM GRANULOCYTES NFR BLD AUTO: 0.2 % — SIGNIFICANT CHANGE UP (ref 0–1.5)
INR BLD: 1.08 RATIO — SIGNIFICANT CHANGE UP (ref 0.88–1.16)
LYMPHOCYTES # BLD AUTO: 1.68 K/UL — SIGNIFICANT CHANGE UP (ref 1–3.3)
LYMPHOCYTES # BLD AUTO: 30.3 % — SIGNIFICANT CHANGE UP (ref 13–44)
MCHC RBC-ENTMCNC: 30.6 PG — SIGNIFICANT CHANGE UP (ref 27–34)
MCHC RBC-ENTMCNC: 33.3 GM/DL — SIGNIFICANT CHANGE UP (ref 32–36)
MCV RBC AUTO: 91.9 FL — SIGNIFICANT CHANGE UP (ref 80–100)
MONOCYTES # BLD AUTO: 0.39 K/UL — SIGNIFICANT CHANGE UP (ref 0–0.9)
MONOCYTES NFR BLD AUTO: 7 % — SIGNIFICANT CHANGE UP (ref 2–14)
NEUTROPHILS # BLD AUTO: 3.39 K/UL — SIGNIFICANT CHANGE UP (ref 1.8–7.4)
NEUTROPHILS NFR BLD AUTO: 61.1 % — SIGNIFICANT CHANGE UP (ref 43–77)
NRBC # BLD: 0 /100 WBCS — SIGNIFICANT CHANGE UP (ref 0–0)
PLATELET # BLD AUTO: 188 K/UL — SIGNIFICANT CHANGE UP (ref 150–400)
POTASSIUM SERPL-MCNC: 4.1 MMOL/L — SIGNIFICANT CHANGE UP (ref 3.5–5.3)
POTASSIUM SERPL-SCNC: 4.1 MMOL/L — SIGNIFICANT CHANGE UP (ref 3.5–5.3)
PROTHROM AB SERPL-ACNC: 11.7 SEC — SIGNIFICANT CHANGE UP (ref 9.8–12.7)
RBC # BLD: 3.72 M/UL — LOW (ref 3.8–5.2)
RBC # FLD: 13.2 % — SIGNIFICANT CHANGE UP (ref 10.3–14.5)
SODIUM SERPL-SCNC: 142 MMOL/L — SIGNIFICANT CHANGE UP (ref 135–145)
WBC # BLD: 5.55 K/UL — SIGNIFICANT CHANGE UP (ref 3.8–10.5)
WBC # FLD AUTO: 5.55 K/UL — SIGNIFICANT CHANGE UP (ref 3.8–10.5)

## 2018-09-12 PROCEDURE — 93010 ELECTROCARDIOGRAM REPORT: CPT

## 2018-09-12 PROCEDURE — 71045 X-RAY EXAM CHEST 1 VIEW: CPT | Mod: 26

## 2018-09-12 RX ORDER — LOSARTAN POTASSIUM 100 MG/1
25 TABLET, FILM COATED ORAL DAILY
Qty: 0 | Refills: 0 | Status: DISCONTINUED | OUTPATIENT
Start: 2018-09-12 | End: 2018-09-15

## 2018-09-12 RX ORDER — DOCUSATE SODIUM 100 MG
100 CAPSULE ORAL THREE TIMES A DAY
Qty: 0 | Refills: 0 | Status: DISCONTINUED | OUTPATIENT
Start: 2018-09-12 | End: 2018-09-15

## 2018-09-12 RX ORDER — CARVEDILOL PHOSPHATE 80 MG/1
3.12 CAPSULE, EXTENDED RELEASE ORAL EVERY 12 HOURS
Qty: 0 | Refills: 0 | Status: DISCONTINUED | OUTPATIENT
Start: 2018-09-12 | End: 2018-09-15

## 2018-09-12 RX ORDER — SENNA PLUS 8.6 MG/1
2 TABLET ORAL AT BEDTIME
Qty: 0 | Refills: 0 | Status: DISCONTINUED | OUTPATIENT
Start: 2018-09-12 | End: 2018-09-15

## 2018-09-12 RX ADMIN — Medication 100 MILLIGRAM(S): at 21:10

## 2018-09-12 RX ADMIN — LOSARTAN POTASSIUM 25 MILLIGRAM(S): 100 TABLET, FILM COATED ORAL at 17:26

## 2018-09-12 RX ADMIN — HEPARIN SODIUM 5000 UNIT(S): 5000 INJECTION INTRAVENOUS; SUBCUTANEOUS at 17:27

## 2018-09-12 RX ADMIN — CARVEDILOL PHOSPHATE 3.12 MILLIGRAM(S): 80 CAPSULE, EXTENDED RELEASE ORAL at 17:26

## 2018-09-12 RX ADMIN — SENNA PLUS 2 TABLET(S): 8.6 TABLET ORAL at 21:10

## 2018-09-12 NOTE — H&P ADULT - NSHPPHYSICALEXAM_GEN_ALL_CORE
Vital Signs Last 24 Hrs  T(C): 36.4 (12 Sep 2018 15:49), Max: 36.4 (12 Sep 2018 15:49)  T(F): 97.6 (12 Sep 2018 15:49), Max: 97.6 (12 Sep 2018 15:49)  HR: 70 (12 Sep 2018 15:49) (70 - 70)  BP: 143/68 (12 Sep 2018 15:49) (143/68 - 143/68)  BP(mean): --  RR: 16 (12 Sep 2018 15:49) (16 - 16)  SpO2: 97% (12 Sep 2018 15:49) (97% - 97%)    NAD  awake and alert  comfortable  heent wnl  neck from  chest clear  abd soft, well healed scars

## 2018-09-12 NOTE — H&P ADULT - HISTORY OF PRESENT ILLNESS
79 y/o female with hx of diverticulitis s/p sigmoidectomy. ileostomy, s/p reversal of ileostomy. She has been having issues with abd pains and SBOs since her reversal of ileostomy done back in feb 2018.  currently denies cp or sob  no n/v, no urinary c/o  +BM

## 2018-09-12 NOTE — H&P ADULT - PROBLEM SELECTOR PLAN 1
admit to Dr Treviño, CRS  DASH diet  f/u admission labs  consult Dr Dalal for medical clearance  consult Dr Azevedo group for cards clearance  will need bowel prep   plan is for lap possible open Small bowel resection and KATHERINE on saturday

## 2018-09-12 NOTE — CONSULT NOTE ADULT - SUBJECTIVE AND OBJECTIVE BOX
Date of Service 09-12-18 @ 16:26    Patient is a 78y old  Female who presents with a chief complaint of " I have pain in my belly"      HPI: Pt is a 79 y/o female with hx of HTN, moderate aortic regurgitation, chronic systolic CHF (ef 40-45% based on echo in 2017), s/p AICD, breast CA, colon CA s/p resection, diverticulitis s/p sigmoidectomy with ileostomy in 2017 which was reversed Feb 2018.  Since than she is having recurrent abdominal pain with recurrent SBO s/p recent hospitalization at [facility] which was treated conservatively.  Due to recurrent abdominal pain she was admitted for probable small bowel resection with KATHERINE.         PAST MEDICAL & SURGICAL HISTORY:  Diverticulitis  Cardiomyopathy  Breast cancer  Valvular heart disease  HTN (hypertension)  S/P colon resection  S/P lumpectomy, right breast  S/P appendectomy  H/O bilateral oophorectomy  S/P ileostomy  AICD (automatic cardioverter/defibrillator) present  H/O lymphadenopathy      Allergies    Cipro (Rash)  codeine (Faint)  IV Contrast (Rash)  Keflex (Rash)    Intolerances        MEDICATIONS  (STANDING):  carvedilol 3.125 milliGRAM(s) Oral every 12 hours  docusate sodium 100 milliGRAM(s) Oral three times a day  heparin  Injectable 5000 Unit(s) SubCutaneous every 12 hours  losartan 25 milliGRAM(s) Oral daily  senna 2 Tablet(s) Oral at bedtime    MEDICATIONS  (PRN):  acetaminophen   Tablet .. 650 milliGRAM(s) Oral every 6 hours PRN Temp greater or equal to 38C (100.4F), Mild Pain (1 - 3)  ondansetron Injectable 4 milliGRAM(s) IV Push every 6 hours PRN Nausea      FAMILY HISTORY:  No pertinent family history in first degree relatives      Social History: denies any smoking or ETOH use.      Vital Signs Last 24 Hrs  T(C): 36.4 (12 Sep 2018 15:49), Max: 36.4 (12 Sep 2018 15:49)  T(F): 97.6 (12 Sep 2018 15:49), Max: 97.6 (12 Sep 2018 15:49)  HR: 70 (12 Sep 2018 15:49) (70 - 70)  BP: 143/68 (12 Sep 2018 15:49) (143/68 - 143/68)  BP(mean): --  RR: 16 (12 Sep 2018 15:49) (16 - 16)  SpO2: 97% (12 Sep 2018 15:49) (97% - 97%)    Daily     Daily     I&O's Summary        Data Date of Service 09-12-18 @ 16:26    Patient is a 78y old  Female who presents with a chief complaint of " I have pain in my belly"      HPI: Pt is a 77 y/o female with hx of HTN, moderate aortic regurgitation, chronic systolic CHF due to RT (ef improved from 30% to 40-45% based on echo in 2017), s/p AICD, breast CA, colon CA s/p resection, diverticulitis s/p sigmoidectomy with ileostomy in 2017 which was reversed Feb 2018.  Since than she is having recurrent abdominal pain with recurrent SBO s/p recent hospitalization at [facility] which was treated conservatively.  Due to recurrent abdominal pain she was admitted for probable small bowel resection with KATHERINE.  Pt seen and examined, case d/w Jd.  Pt c/o intermittent abdominal pain, no CP or SOB.      PAST MEDICAL & SURGICAL HISTORY:  Diverticulitis  Cardiomyopathy  Breast cancer  Valvular heart disease  HTN (hypertension)  S/P colon resection  S/P lumpectomy, right breast  S/P appendectomy  H/O bilateral oophorectomy  S/P ileostomy  AICD (automatic cardioverter/defibrillator) present  H/O lymphadenopathy      Allergies    Cipro (Rash)  codeine (Faint)  IV Contrast (Rash)  Keflex (Rash)    Intolerances        MEDICATIONS  (STANDING):  carvedilol 3.125 milliGRAM(s) Oral every 12 hours  docusate sodium 100 milliGRAM(s) Oral three times a day  heparin  Injectable 5000 Unit(s) SubCutaneous every 12 hours  losartan 25 milliGRAM(s) Oral daily  senna 2 Tablet(s) Oral at bedtime    MEDICATIONS  (PRN):  acetaminophen   Tablet .. 650 milliGRAM(s) Oral every 6 hours PRN Temp greater or equal to 38C (100.4F), Mild Pain (1 - 3)  ondansetron Injectable 4 milliGRAM(s) IV Push every 6 hours PRN Nausea      FAMILY HISTORY:  No pertinent family history in first degree relatives      Social History: denies any smoking or ETOH use.  Lives with her grandson       Vital Signs Last 24 Hrs  T(C): 36.4 (12 Sep 2018 15:49), Max: 36.4 (12 Sep 2018 15:49)  T(F): 97.6 (12 Sep 2018 15:49), Max: 97.6 (12 Sep 2018 15:49)  HR: 70 (12 Sep 2018 15:49) (70 - 70)  BP: 143/68 (12 Sep 2018 15:49) (143/68 - 143/68)  BP(mean): --  RR: 16 (12 Sep 2018 15:49) (16 - 16)  SpO2: 97% (12 Sep 2018 15:49) (97% - 97%)    Daily     Daily     I&O's Summary        Data    Labs pending   EKG ordered

## 2018-09-12 NOTE — PHYSICAL THERAPY INITIAL EVALUATION ADULT - ADDITIONAL COMMENTS
Last visit 05/04/18. Next visit 11/15/18.    pt [place] 5 +5 steps to enter (+rail). has been going through garage and doing indoor steps. pt independent with mobility without device until about a week ago. since then has been using a borrowed cane due to back pain. jae lives with pt, but works overnight and sleeps during the day. +driving. has shower chair but does not use.

## 2018-09-12 NOTE — CONSULT NOTE ADULT - ASSESSMENT
Pt is a 77 y/o female with hx of HTN, moderate aortic regurgitation, chronic systolic CHF (ef 40-45% based on echo in 2017), s/p AICD, breast CA, colon CA s/p resection, diverticulitis s/p sigmoidectomy with ileostomy in 2017 which was reversed Feb 2018.  Since than she is having recurrent abdominal pain with recurrent SBO s/p recent hospitalization at [facility] which was treated conservatively.  Due to recurrent abdominal pain she was admitted for probable small bowel resection with KATHERINE. Pt is a 77 y/o female with hx of HTN, moderate aortic regurgitation, chronic systolic CHF due to RT (ef 40-45% based on echo in 2017), s/p AICD, breast CA, colon CA s/p resection, diverticulitis s/p sigmoidectomy with ileostomy in 2017 which was reversed Feb 2018.  Since than she is having recurrent abdominal pain with recurrent SBO s/p recent hospitalization at [facility] which was treated conservatively.  Due to recurrent abdominal pain she was admitted for probable small bowel resection with KATHERINE.         * Recurrent SBO-plans for probable small bowel resection with KATHERINE.  Await preop EKG, cards eval, minimize IVF, further management per colorectal surgery.  * Anemia-chronic, monitor, await repeat labs.  * Chronic Systolic CHF-due to RT, her ef improved, interrogate AICD, continue coreg and losartan.  Presently euvolemic monitor.  * Proph- heparin  * Disp-admission, OOB   * Comm-d/w pt in details, all questions answered, Jd

## 2018-09-13 DIAGNOSIS — Z01.810 ENCOUNTER FOR PREPROCEDURAL CARDIOVASCULAR EXAMINATION: ICD-10-CM

## 2018-09-13 DIAGNOSIS — I38 ENDOCARDITIS, VALVE UNSPECIFIED: ICD-10-CM

## 2018-09-13 DIAGNOSIS — I42.9 CARDIOMYOPATHY, UNSPECIFIED: ICD-10-CM

## 2018-09-13 PROCEDURE — 93282 PRGRMG EVAL IMPLANTABLE DFB: CPT | Mod: 26

## 2018-09-13 PROCEDURE — 74177 CT ABD & PELVIS W/CONTRAST: CPT | Mod: 26

## 2018-09-13 PROCEDURE — 99222 1ST HOSP IP/OBS MODERATE 55: CPT

## 2018-09-13 RX ORDER — SODIUM CHLORIDE 9 MG/ML
1000 INJECTION INTRAMUSCULAR; INTRAVENOUS; SUBCUTANEOUS
Qty: 0 | Refills: 0 | Status: DISCONTINUED | OUTPATIENT
Start: 2018-09-14 | End: 2018-09-15

## 2018-09-13 RX ORDER — ALVIMOPAN 12 MG/1
12 CAPSULE ORAL ONCE
Qty: 0 | Refills: 0 | Status: COMPLETED | OUTPATIENT
Start: 2018-09-15 | End: 2018-09-15

## 2018-09-13 RX ORDER — SOD SULF/SODIUM/NAHCO3/KCL/PEG
4000 SOLUTION, RECONSTITUTED, ORAL ORAL ONCE
Qty: 0 | Refills: 0 | Status: COMPLETED | OUTPATIENT
Start: 2018-09-14 | End: 2018-09-14

## 2018-09-13 RX ADMIN — HEPARIN SODIUM 5000 UNIT(S): 5000 INJECTION INTRAVENOUS; SUBCUTANEOUS at 17:04

## 2018-09-13 RX ADMIN — Medication 100 MILLIGRAM(S): at 05:12

## 2018-09-13 RX ADMIN — CARVEDILOL PHOSPHATE 3.12 MILLIGRAM(S): 80 CAPSULE, EXTENDED RELEASE ORAL at 17:04

## 2018-09-13 RX ADMIN — CARVEDILOL PHOSPHATE 3.12 MILLIGRAM(S): 80 CAPSULE, EXTENDED RELEASE ORAL at 05:12

## 2018-09-13 RX ADMIN — LOSARTAN POTASSIUM 25 MILLIGRAM(S): 100 TABLET, FILM COATED ORAL at 05:12

## 2018-09-13 RX ADMIN — Medication 100 MILLIGRAM(S): at 21:15

## 2018-09-13 RX ADMIN — HEPARIN SODIUM 5000 UNIT(S): 5000 INJECTION INTRAVENOUS; SUBCUTANEOUS at 05:12

## 2018-09-13 RX ADMIN — SENNA PLUS 2 TABLET(S): 8.6 TABLET ORAL at 21:16

## 2018-09-13 NOTE — CONSULT NOTE ADULT - SUBJECTIVE AND OBJECTIVE BOX
CHIEF COMPLAINT: Patient is a 78y old  Female who presents with a chief complaint of abdominal pain (13 Sep 2018 09:46)    HPI:  77 y/o woman with a history of breast cancer, nonischemic cardiomyopathy, primary prevention ICD, valvular heart disease, diverticulitis s/p sigmoidectomy, ileostomy, s/p reversal of ileostomy.  She presents for the evaluation of recurrent abdominal pain and possible surgery.  She describes chronic, stable heart disease with good baseline functional status; denies angina, dyspnea, palpitations, syncope.  She is able to climb stairs in her home and participate in household chores.    PAST MEDICAL & SURGICAL HISTORY:  Diverticulitis  Cardiomyopathy  Breast cancer  Valvular heart disease  HTN (hypertension)  S/P colon resection  S/P lumpectomy, right breast  S/P appendectomy  H/O bilateral oophorectomy  S/P ileostomy  AICD (automatic cardioverter/defibrillator) present  H/O lymphadenopathy    SOCIAL HISTORY:   Alcohol: Denied  Smoking: Nonsmoker    FAMILY HISTORY: No pertinent family history in first degree relatives    MEDICATIONS  (STANDING):  carvedilol 3.125 milliGRAM(s) Oral every 12 hours  docusate sodium 100 milliGRAM(s) Oral three times a day  heparin  Injectable 5000 Unit(s) SubCutaneous every 12 hours  losartan 25 milliGRAM(s) Oral daily  senna 2 Tablet(s) Oral at bedtime    MEDICATIONS  (PRN):  acetaminophen   Tablet .. 650 milliGRAM(s) Oral every 6 hours PRN Temp greater or equal to 38C (100.4F), Mild Pain (1 - 3)  ondansetron Injectable 4 milliGRAM(s) IV Push every 6 hours PRN Nausea    Allergies:   Cipro (Rash)  codeine (Faint)  IV Contrast (Rash)  Keflex (Rash)      REVIEW OF SYSTEMS:  CONSTITUTIONAL: No weakness, fevers or chills  Eyes: No visual changes  NECK: No pain or stiffness  RESPIRATORY: No cough, wheezing, hemoptysis; No shortness of breath  CARDIOVASCULAR: No chest pain or palpitations  GASTROINTESTINAL: + abdominal pain.  GENITOURINARY: No dysuria, frequency or hematuria  NEUROLOGICAL: No numbness.  SKIN: No itching or rash  All other review of systems is negative unless indicated above    VITAL SIGNS:   Vital Signs Last 24 Hrs  T(C): 36.5 (13 Sep 2018 11:20), Max: 36.8 (12 Sep 2018 20:05)  T(F): 97.7 (13 Sep 2018 11:20), Max: 98.2 (12 Sep 2018 20:05)  HR: 84 (13 Sep 2018 11:20) (67 - 84)  BP: 142/71 (13 Sep 2018 11:20) (112/53 - 143/68)  RR: 19 (13 Sep 2018 11:20) (16 - 19)  SpO2: 99% (13 Sep 2018 11:20) (97% - 99%)    PHYSICAL EXAM:  Constitutional: NAD, awake and alert, appears stated age  HEENT:  EOMI,  Pupils round  Pulmonary: Non-labored, breath sounds are clear bilaterally, No wheezing, rales or rhonchi  Cardiovascular: S1 and S2, regular rate and rhythm, diastolic murmur  Gastrointestinal: Bowel Sounds present, soft, nontender.   Lymph: No peripheral edema. No cervical lymphadenopathy.  Neurological: Alert, no focal deficits  Skin: No rashes.  Psych:  Mood & affect appropriate    LABS:                   11.4   5.55  )-----------( 188      ( 12 Sep 2018 16:54 )             34.2     142    |  107    |  11     ----------------------------<  93     4.1     |  30     |  0.64     Ca    9.3        12 Sep 2018 16:54  PT/INR - ( 12 Sep 2018 16:54 )   PT: 11.7 sec;   INR: 1.08 ratio    PTT - ( 12 Sep 2018 16:54 )  PTT:30.4 sec    ECG: Normal sinus rhythm, PVC    Xray Chest 1 View AP/PA. (09.12.18 @ 15:56):  The lungs are clear.  No pleural abnormality is seen.  Left chest wall pacemaker in place.  Cardiomegaly present.    TTE Echo Complete w/Doppler (07.26.17 @ 11:57):   1. Technically fair study.   2. Mildly decreased global left ventricular systolic function.   3. Left ventricular ejection fraction, by visual estimation, is 40 to 45%.   4. Spectral Doppler shows impaired relaxation pattern of left ventricular myocardial filling (Grade I diastolic dysfunction).   5. Basal anterior segment and basal inferior segment are abnormal.   6. Thickening of the anterior and posterior mitral valve leaflets.   7. Mild mitral annular calcification.   8. Moderate to severe aortic regurgitation.   9. Sclerotic aortic valve with decreased opening.  10. Mildly enlarged left atrium.  11. Abnormal septal motion consistent with RV pacemaker.  12. Trivial pericardial effusion.

## 2018-09-13 NOTE — CHART NOTE - NSCHARTNOTEFT_GEN_A_CORE
Upon Nutritional Assessment by the Registered Dietitian your patient was determined to meet criteria / has evidence of the following diagnosis/diagnoses:          [ ]  Mild Protein Calorie Malnutrition        [x]  Moderate Protein Calorie Malnutrition        [ ] Severe Protein Calorie Malnutrition        [ ] Unspecified Protein Calorie Malnutrition        [ ] Underweight / BMI <19        [ ] Morbid Obesity / BMI > 40      Findings:  Upon visit, pt eating breakfast and reports tolerance.  Pt endorses good PO intake PTA with 3 meals/day (low protein) and 2 ensure's per day.  However, pt also reported vomiting 2/2 obstruction.  As pt was vomiting some of consumed food, pt may have been meeting <75% of estimated nutr needs.  NFPE significant for moderate temporal and clavicle muscle wasting.  moderate orbital wasting. no wt/ht recorded on admission for pt; pt reports ~20# wt loss in past 14 months (~14%); not clinically significant.  Based on above, pt meets criteria for moderate malnutrition in chronic illness.     Findings as based on:  •  Comprehensive nutrition assessment and consultation  •  Calorie counts (nutrient intake analysis)  •  Food acceptance and intake status from observations by staff  •  Follow up  •  Patient education  •  Intervention secondary to interdisciplinary rounds  •   concerns      Treatment:    The following diet has been recommended:  1) add ensure enlive BID with ensure pudding once daily   2) monitor PO intake/tolerance   3) add MVI with minerals daily to ensure 100% RDI met   4) obtian wt when feasible    PROVIDER Section:     By signing this assessment you are acknowledging and agree with the diagnosis/diagnoses assigned by the Registered Dietitian    Comments:

## 2018-09-13 NOTE — CONSULT NOTE ADULT - PROBLEM SELECTOR RECOMMENDATION 9
Mrs. Jacinto appears optimized from a cardiac standpoint for surgery.  She has several clinical predictors of ruth ann-op cardiac complications (advancing age, history of CHF, mod-sev valvular heart disease); however, her functional status is good (>4 METS). Normal ICD function on today's interrogation. Continue ruth ann-op beta blockade with Coreg.

## 2018-09-13 NOTE — CONSULT NOTE ADULT - PROBLEM SELECTOR RECOMMENDATION 2
Appears compensated; mild to moderate LV dysfunction on echo ~ 1 year ago. Continue Coreg, Losartan.

## 2018-09-14 LAB
BLD GP AB SCN SERPL QL: SIGNIFICANT CHANGE UP
HBA1C BLD-MCNC: 5.1 % — SIGNIFICANT CHANGE UP (ref 4–5.6)
TYPE + AB SCN PNL BLD: SIGNIFICANT CHANGE UP

## 2018-09-14 PROCEDURE — 99233 SBSQ HOSP IP/OBS HIGH 50: CPT

## 2018-09-14 RX ADMIN — HEPARIN SODIUM 5000 UNIT(S): 5000 INJECTION INTRAVENOUS; SUBCUTANEOUS at 17:01

## 2018-09-14 RX ADMIN — HEPARIN SODIUM 5000 UNIT(S): 5000 INJECTION INTRAVENOUS; SUBCUTANEOUS at 05:16

## 2018-09-14 RX ADMIN — Medication 100 MILLIGRAM(S): at 22:34

## 2018-09-14 RX ADMIN — CARVEDILOL PHOSPHATE 3.12 MILLIGRAM(S): 80 CAPSULE, EXTENDED RELEASE ORAL at 17:01

## 2018-09-14 RX ADMIN — Medication 100 MILLIGRAM(S): at 05:16

## 2018-09-14 RX ADMIN — CARVEDILOL PHOSPHATE 3.12 MILLIGRAM(S): 80 CAPSULE, EXTENDED RELEASE ORAL at 05:16

## 2018-09-14 RX ADMIN — Medication 4000 MILLILITER(S): at 17:00

## 2018-09-14 RX ADMIN — SODIUM CHLORIDE 50 MILLILITER(S): 9 INJECTION INTRAMUSCULAR; INTRAVENOUS; SUBCUTANEOUS at 17:00

## 2018-09-14 RX ADMIN — SENNA PLUS 2 TABLET(S): 8.6 TABLET ORAL at 22:34

## 2018-09-14 RX ADMIN — LOSARTAN POTASSIUM 25 MILLIGRAM(S): 100 TABLET, FILM COATED ORAL at 05:16

## 2018-09-14 RX ADMIN — ONDANSETRON 4 MILLIGRAM(S): 8 TABLET, FILM COATED ORAL at 19:30

## 2018-09-15 ENCOUNTER — APPOINTMENT (OUTPATIENT)
Dept: COLORECTAL SURGERY | Facility: HOSPITAL | Age: 78
End: 2018-09-15

## 2018-09-15 ENCOUNTER — RESULT REVIEW (OUTPATIENT)
Age: 78
End: 2018-09-15

## 2018-09-15 ENCOUNTER — APPOINTMENT (OUTPATIENT)
Dept: COLORECTAL SURGERY | Facility: HOSPITAL | Age: 78
End: 2018-09-15
Payer: MEDICARE

## 2018-09-15 LAB — GLUCOSE BLDC GLUCOMTR-MCNC: 92 MG/DL — SIGNIFICANT CHANGE UP (ref 70–99)

## 2018-09-15 PROCEDURE — 49002 REOPENING OF ABDOMEN: CPT | Mod: 59

## 2018-09-15 PROCEDURE — 88305 TISSUE EXAM BY PATHOLOGIST: CPT | Mod: 26

## 2018-09-15 PROCEDURE — 58740 ADHESIOLYSIS TUBE OVARY: CPT

## 2018-09-15 RX ORDER — ONDANSETRON 8 MG/1
4 TABLET, FILM COATED ORAL ONCE
Qty: 0 | Refills: 0 | Status: COMPLETED | OUTPATIENT
Start: 2018-09-15 | End: 2018-09-15

## 2018-09-15 RX ORDER — HEPARIN SODIUM 5000 [USP'U]/ML
5000 INJECTION INTRAVENOUS; SUBCUTANEOUS EVERY 12 HOURS
Qty: 0 | Refills: 0 | Status: DISCONTINUED | OUTPATIENT
Start: 2018-09-15 | End: 2018-09-23

## 2018-09-15 RX ORDER — CARVEDILOL PHOSPHATE 80 MG/1
3.12 CAPSULE, EXTENDED RELEASE ORAL EVERY 12 HOURS
Qty: 0 | Refills: 0 | Status: DISCONTINUED | OUTPATIENT
Start: 2018-09-15 | End: 2018-09-23

## 2018-09-15 RX ORDER — ONDANSETRON 8 MG/1
4 TABLET, FILM COATED ORAL EVERY 6 HOURS
Qty: 0 | Refills: 0 | Status: DISCONTINUED | OUTPATIENT
Start: 2018-09-15 | End: 2018-09-19

## 2018-09-15 RX ORDER — DOCUSATE SODIUM 100 MG
100 CAPSULE ORAL THREE TIMES A DAY
Qty: 0 | Refills: 0 | Status: DISCONTINUED | OUTPATIENT
Start: 2018-09-15 | End: 2018-09-19

## 2018-09-15 RX ORDER — SODIUM CHLORIDE 9 MG/ML
1000 INJECTION INTRAMUSCULAR; INTRAVENOUS; SUBCUTANEOUS
Qty: 0 | Refills: 0 | Status: DISCONTINUED | OUTPATIENT
Start: 2018-09-15 | End: 2018-09-17

## 2018-09-15 RX ORDER — PROCHLORPERAZINE MALEATE 5 MG
10 TABLET ORAL ONCE
Qty: 0 | Refills: 0 | Status: COMPLETED | OUTPATIENT
Start: 2018-09-15 | End: 2018-09-15

## 2018-09-15 RX ORDER — HYDROMORPHONE HYDROCHLORIDE 2 MG/ML
0.5 INJECTION INTRAMUSCULAR; INTRAVENOUS; SUBCUTANEOUS
Qty: 0 | Refills: 0 | Status: DISCONTINUED | OUTPATIENT
Start: 2018-09-15 | End: 2018-09-17

## 2018-09-15 RX ORDER — SODIUM CHLORIDE 9 MG/ML
1000 INJECTION, SOLUTION INTRAVENOUS
Qty: 0 | Refills: 0 | Status: DISCONTINUED | OUTPATIENT
Start: 2018-09-15 | End: 2018-09-15

## 2018-09-15 RX ORDER — LOSARTAN POTASSIUM 100 MG/1
25 TABLET, FILM COATED ORAL DAILY
Qty: 0 | Refills: 0 | Status: DISCONTINUED | OUTPATIENT
Start: 2018-09-15 | End: 2018-09-23

## 2018-09-15 RX ORDER — SENNA PLUS 8.6 MG/1
2 TABLET ORAL AT BEDTIME
Qty: 0 | Refills: 0 | Status: DISCONTINUED | OUTPATIENT
Start: 2018-09-15 | End: 2018-09-19

## 2018-09-15 RX ORDER — ACETAMINOPHEN 500 MG
650 TABLET ORAL EVERY 6 HOURS
Qty: 0 | Refills: 0 | Status: DISCONTINUED | OUTPATIENT
Start: 2018-09-15 | End: 2018-09-23

## 2018-09-15 RX ORDER — NALOXONE HYDROCHLORIDE 4 MG/.1ML
0.1 SPRAY NASAL
Qty: 0 | Refills: 0 | Status: DISCONTINUED | OUTPATIENT
Start: 2018-09-15 | End: 2018-09-23

## 2018-09-15 RX ORDER — HYDROMORPHONE HYDROCHLORIDE 2 MG/ML
30 INJECTION INTRAMUSCULAR; INTRAVENOUS; SUBCUTANEOUS
Qty: 0 | Refills: 0 | Status: DISCONTINUED | OUTPATIENT
Start: 2018-09-15 | End: 2018-09-17

## 2018-09-15 RX ORDER — ONDANSETRON 8 MG/1
4 TABLET, FILM COATED ORAL EVERY 6 HOURS
Qty: 0 | Refills: 0 | Status: DISCONTINUED | OUTPATIENT
Start: 2018-09-15 | End: 2018-09-23

## 2018-09-15 RX ORDER — ACETAMINOPHEN 500 MG
1000 TABLET ORAL ONCE
Qty: 0 | Refills: 0 | Status: COMPLETED | OUTPATIENT
Start: 2018-09-15 | End: 2018-09-15

## 2018-09-15 RX ADMIN — ONDANSETRON 4 MILLIGRAM(S): 8 TABLET, FILM COATED ORAL at 10:43

## 2018-09-15 RX ADMIN — Medication 1000 MILLIGRAM(S): at 11:15

## 2018-09-15 RX ADMIN — SODIUM CHLORIDE 75 MILLILITER(S): 9 INJECTION, SOLUTION INTRAVENOUS at 11:11

## 2018-09-15 RX ADMIN — ALVIMOPAN 12 MILLIGRAM(S): 12 CAPSULE ORAL at 06:10

## 2018-09-15 RX ADMIN — SODIUM CHLORIDE 50 MILLILITER(S): 9 INJECTION INTRAMUSCULAR; INTRAVENOUS; SUBCUTANEOUS at 12:36

## 2018-09-15 RX ADMIN — CARVEDILOL PHOSPHATE 3.12 MILLIGRAM(S): 80 CAPSULE, EXTENDED RELEASE ORAL at 06:10

## 2018-09-15 RX ADMIN — Medication 10 MILLIGRAM(S): at 11:07

## 2018-09-15 RX ADMIN — HEPARIN SODIUM 5000 UNIT(S): 5000 INJECTION INTRAVENOUS; SUBCUTANEOUS at 06:10

## 2018-09-15 RX ADMIN — Medication 400 MILLIGRAM(S): at 10:43

## 2018-09-15 RX ADMIN — SENNA PLUS 2 TABLET(S): 8.6 TABLET ORAL at 21:26

## 2018-09-15 RX ADMIN — HYDROMORPHONE HYDROCHLORIDE 30 MILLILITER(S): 2 INJECTION INTRAMUSCULAR; INTRAVENOUS; SUBCUTANEOUS at 11:07

## 2018-09-15 RX ADMIN — HEPARIN SODIUM 5000 UNIT(S): 5000 INJECTION INTRAVENOUS; SUBCUTANEOUS at 17:06

## 2018-09-15 RX ADMIN — Medication 100 MILLIGRAM(S): at 21:26

## 2018-09-15 NOTE — BRIEF OPERATIVE NOTE - OPERATION/FINDINGS
Laparoscopic converted to exploratory laparotomy for small bowel obstruction 2/2 adhesions  Loop ileostomy take down anastomosis adhesed to anterior abdominal wall  Small bowel anastomosis from original surgery adhesed to pelvis  multiple other adhesions noted  Lysis of adhesions performed  large defect in omentum noted, omentectomy performed  Bowel ran from ligament of treitz to cecum multiple times  Seprafilm placed in pelvis and anterior to bowel and omentum  Closed fascia, staples for skin,

## 2018-09-15 NOTE — BRIEF OPERATIVE NOTE - PROCEDURE
<<-----Click on this checkbox to enter Procedure Omentectomy  09/15/2018    Active  ZEN  Diagnostic laparoscopy  09/15/2018    Active  ZEN  Exploratory laparotomy  09/15/2018    Active  ZEN  Lysis of adhesions for small bowel obstruction  09/15/2018    Active  ZEN

## 2018-09-16 LAB
ANION GAP SERPL CALC-SCNC: 3 MMOL/L — LOW (ref 5–17)
BUN SERPL-MCNC: 9 MG/DL — SIGNIFICANT CHANGE UP (ref 7–23)
CALCIUM SERPL-MCNC: 9.2 MG/DL — SIGNIFICANT CHANGE UP (ref 8.5–10.1)
CHLORIDE SERPL-SCNC: 109 MMOL/L — HIGH (ref 96–108)
CO2 SERPL-SCNC: 28 MMOL/L — SIGNIFICANT CHANGE UP (ref 22–31)
CREAT SERPL-MCNC: 0.73 MG/DL — SIGNIFICANT CHANGE UP (ref 0.5–1.3)
GLUCOSE SERPL-MCNC: 111 MG/DL — HIGH (ref 70–99)
HCT VFR BLD CALC: 36.3 % — SIGNIFICANT CHANGE UP (ref 34.5–45)
HGB BLD-MCNC: 11.9 G/DL — SIGNIFICANT CHANGE UP (ref 11.5–15.5)
MCHC RBC-ENTMCNC: 30.6 PG — SIGNIFICANT CHANGE UP (ref 27–34)
MCHC RBC-ENTMCNC: 32.8 GM/DL — SIGNIFICANT CHANGE UP (ref 32–36)
MCV RBC AUTO: 93.3 FL — SIGNIFICANT CHANGE UP (ref 80–100)
NRBC # BLD: 0 /100 WBCS — SIGNIFICANT CHANGE UP (ref 0–0)
PLATELET # BLD AUTO: 189 K/UL — SIGNIFICANT CHANGE UP (ref 150–400)
POTASSIUM SERPL-MCNC: 4.1 MMOL/L — SIGNIFICANT CHANGE UP (ref 3.5–5.3)
POTASSIUM SERPL-SCNC: 4.1 MMOL/L — SIGNIFICANT CHANGE UP (ref 3.5–5.3)
RBC # BLD: 3.89 M/UL — SIGNIFICANT CHANGE UP (ref 3.8–5.2)
RBC # FLD: 13.4 % — SIGNIFICANT CHANGE UP (ref 10.3–14.5)
SODIUM SERPL-SCNC: 140 MMOL/L — SIGNIFICANT CHANGE UP (ref 135–145)
WBC # BLD: 7.61 K/UL — SIGNIFICANT CHANGE UP (ref 3.8–10.5)
WBC # FLD AUTO: 7.61 K/UL — SIGNIFICANT CHANGE UP (ref 3.8–10.5)

## 2018-09-16 RX ORDER — ACETAMINOPHEN 500 MG
1000 TABLET ORAL ONCE
Qty: 0 | Refills: 0 | Status: COMPLETED | OUTPATIENT
Start: 2018-09-16 | End: 2018-09-16

## 2018-09-16 RX ADMIN — CARVEDILOL PHOSPHATE 3.12 MILLIGRAM(S): 80 CAPSULE, EXTENDED RELEASE ORAL at 18:02

## 2018-09-16 RX ADMIN — SENNA PLUS 2 TABLET(S): 8.6 TABLET ORAL at 21:29

## 2018-09-16 RX ADMIN — CARVEDILOL PHOSPHATE 3.12 MILLIGRAM(S): 80 CAPSULE, EXTENDED RELEASE ORAL at 06:10

## 2018-09-16 RX ADMIN — LOSARTAN POTASSIUM 25 MILLIGRAM(S): 100 TABLET, FILM COATED ORAL at 06:10

## 2018-09-16 RX ADMIN — Medication 1000 MILLIGRAM(S): at 22:00

## 2018-09-16 RX ADMIN — HEPARIN SODIUM 5000 UNIT(S): 5000 INJECTION INTRAVENOUS; SUBCUTANEOUS at 06:10

## 2018-09-16 RX ADMIN — HEPARIN SODIUM 5000 UNIT(S): 5000 INJECTION INTRAVENOUS; SUBCUTANEOUS at 18:02

## 2018-09-16 RX ADMIN — Medication 100 MILLIGRAM(S): at 21:29

## 2018-09-16 RX ADMIN — Medication 400 MILLIGRAM(S): at 21:29

## 2018-09-16 RX ADMIN — Medication 100 MILLIGRAM(S): at 06:10

## 2018-09-17 LAB
ANION GAP SERPL CALC-SCNC: 2 MMOL/L — LOW (ref 5–17)
BUN SERPL-MCNC: 11 MG/DL — SIGNIFICANT CHANGE UP (ref 7–23)
CALCIUM SERPL-MCNC: 9.3 MG/DL — SIGNIFICANT CHANGE UP (ref 8.5–10.1)
CHLORIDE SERPL-SCNC: 108 MMOL/L — SIGNIFICANT CHANGE UP (ref 96–108)
CO2 SERPL-SCNC: 29 MMOL/L — SIGNIFICANT CHANGE UP (ref 22–31)
CREAT SERPL-MCNC: 0.67 MG/DL — SIGNIFICANT CHANGE UP (ref 0.5–1.3)
GLUCOSE SERPL-MCNC: 111 MG/DL — HIGH (ref 70–99)
HCT VFR BLD CALC: 32.5 % — LOW (ref 34.5–45)
HGB BLD-MCNC: 10.5 G/DL — LOW (ref 11.5–15.5)
MAGNESIUM SERPL-MCNC: 2 MG/DL — SIGNIFICANT CHANGE UP (ref 1.6–2.6)
MCHC RBC-ENTMCNC: 30.9 PG — SIGNIFICANT CHANGE UP (ref 27–34)
MCHC RBC-ENTMCNC: 32.3 GM/DL — SIGNIFICANT CHANGE UP (ref 32–36)
MCV RBC AUTO: 95.6 FL — SIGNIFICANT CHANGE UP (ref 80–100)
NRBC # BLD: 0 /100 WBCS — SIGNIFICANT CHANGE UP (ref 0–0)
PHOSPHATE SERPL-MCNC: 1.8 MG/DL — LOW (ref 2.5–4.5)
PLATELET # BLD AUTO: 136 K/UL — LOW (ref 150–400)
POTASSIUM SERPL-MCNC: 3.8 MMOL/L — SIGNIFICANT CHANGE UP (ref 3.5–5.3)
POTASSIUM SERPL-SCNC: 3.8 MMOL/L — SIGNIFICANT CHANGE UP (ref 3.5–5.3)
RBC # BLD: 3.4 M/UL — LOW (ref 3.8–5.2)
RBC # FLD: 13.6 % — SIGNIFICANT CHANGE UP (ref 10.3–14.5)
SODIUM SERPL-SCNC: 139 MMOL/L — SIGNIFICANT CHANGE UP (ref 135–145)
WBC # BLD: 6 K/UL — SIGNIFICANT CHANGE UP (ref 3.8–10.5)
WBC # FLD AUTO: 6 K/UL — SIGNIFICANT CHANGE UP (ref 3.8–10.5)

## 2018-09-17 RX ORDER — OXYCODONE AND ACETAMINOPHEN 5; 325 MG/1; MG/1
2 TABLET ORAL EVERY 4 HOURS
Qty: 0 | Refills: 0 | Status: DISCONTINUED | OUTPATIENT
Start: 2018-09-17 | End: 2018-09-23

## 2018-09-17 RX ORDER — OXYCODONE AND ACETAMINOPHEN 5; 325 MG/1; MG/1
1 TABLET ORAL EVERY 4 HOURS
Qty: 0 | Refills: 0 | Status: DISCONTINUED | OUTPATIENT
Start: 2018-09-17 | End: 2018-09-23

## 2018-09-17 RX ORDER — SODIUM,POTASSIUM PHOSPHATES 278-250MG
2 POWDER IN PACKET (EA) ORAL ONCE
Qty: 0 | Refills: 0 | Status: COMPLETED | OUTPATIENT
Start: 2018-09-17 | End: 2018-09-17

## 2018-09-17 RX ADMIN — CARVEDILOL PHOSPHATE 3.12 MILLIGRAM(S): 80 CAPSULE, EXTENDED RELEASE ORAL at 17:34

## 2018-09-17 RX ADMIN — HEPARIN SODIUM 5000 UNIT(S): 5000 INJECTION INTRAVENOUS; SUBCUTANEOUS at 06:02

## 2018-09-17 RX ADMIN — Medication 100 MILLIGRAM(S): at 22:32

## 2018-09-17 RX ADMIN — Medication 100 MILLIGRAM(S): at 15:45

## 2018-09-17 RX ADMIN — Medication 100 MILLIGRAM(S): at 06:02

## 2018-09-17 RX ADMIN — SENNA PLUS 2 TABLET(S): 8.6 TABLET ORAL at 22:32

## 2018-09-17 RX ADMIN — Medication 2 PACKET(S): at 11:51

## 2018-09-17 RX ADMIN — HEPARIN SODIUM 5000 UNIT(S): 5000 INJECTION INTRAVENOUS; SUBCUTANEOUS at 17:13

## 2018-09-17 RX ADMIN — LOSARTAN POTASSIUM 25 MILLIGRAM(S): 100 TABLET, FILM COATED ORAL at 17:34

## 2018-09-17 RX ADMIN — ONDANSETRON 4 MILLIGRAM(S): 8 TABLET, FILM COATED ORAL at 22:36

## 2018-09-17 RX ADMIN — OXYCODONE AND ACETAMINOPHEN 1 TABLET(S): 5; 325 TABLET ORAL at 15:45

## 2018-09-17 RX ADMIN — CARVEDILOL PHOSPHATE 3.12 MILLIGRAM(S): 80 CAPSULE, EXTENDED RELEASE ORAL at 06:02

## 2018-09-17 NOTE — PHYSICAL THERAPY INITIAL EVALUATION ADULT - GENERAL OBSERVATIONS, REHAB EVAL
sitting up in bedside chair ,B Flowtrons,HM,pulse ox,BP cuff LUE,seated posture hunched ,+ scoliosis T/L spine ,Prevena wound vac in place to incision site abdomen

## 2018-09-17 NOTE — PHYSICAL THERAPY INITIAL EVALUATION ADULT - RISK REDUCTION/PREVENTION, PT EVAL
secondary impairments/potential for atelectasis associated with decreased mobility post-op ,kyphoscoliosis

## 2018-09-17 NOTE — PHYSICAL THERAPY INITIAL EVALUATION ADULT - ACTIVE RANGE OF MOTION EXAMINATION, REHAB EVAL
bilateral upper extremity Active ROM was WFL (within functional limits)/bilateral  lower extremity Active ROM was WFL (within functional limits)/overhead shoulder elevation mildly limited bilaterally due to postural changes/scoliosis  T/L spine

## 2018-09-17 NOTE — PHYSICAL THERAPY INITIAL EVALUATION ADULT - DIAGNOSIS, PT EVAL
recurrent SBO s/p exploratory lap & KATHERINE ,pt with prior h/o sigmoidectomy/ileostomy 2017 and subsequent reversal Feb 2018

## 2018-09-17 NOTE — PHYSICAL THERAPY INITIAL EVALUATION ADULT - CRITERIA FOR SKILLED THERAPEUTIC INTERVENTIONS
predicted duration of therapy intervention/impairments found/therapy frequency/risk reduction/prevention/rehab potential/anticipated equipment needs at discharge/anticipated discharge recommendation/functional limitations in following categories

## 2018-09-18 LAB
ANION GAP SERPL CALC-SCNC: 7 MMOL/L — SIGNIFICANT CHANGE UP (ref 5–17)
BUN SERPL-MCNC: 19 MG/DL — SIGNIFICANT CHANGE UP (ref 7–23)
CALCIUM SERPL-MCNC: 10 MG/DL — SIGNIFICANT CHANGE UP (ref 8.5–10.1)
CHLORIDE SERPL-SCNC: 104 MMOL/L — SIGNIFICANT CHANGE UP (ref 96–108)
CO2 SERPL-SCNC: 31 MMOL/L — SIGNIFICANT CHANGE UP (ref 22–31)
CREAT SERPL-MCNC: 0.62 MG/DL — SIGNIFICANT CHANGE UP (ref 0.5–1.3)
GLUCOSE SERPL-MCNC: 160 MG/DL — HIGH (ref 70–99)
HCT VFR BLD CALC: 36.3 % — SIGNIFICANT CHANGE UP (ref 34.5–45)
HGB BLD-MCNC: 12.1 G/DL — SIGNIFICANT CHANGE UP (ref 11.5–15.5)
MCHC RBC-ENTMCNC: 30.7 PG — SIGNIFICANT CHANGE UP (ref 27–34)
MCHC RBC-ENTMCNC: 33.3 GM/DL — SIGNIFICANT CHANGE UP (ref 32–36)
MCV RBC AUTO: 92.1 FL — SIGNIFICANT CHANGE UP (ref 80–100)
NRBC # BLD: 0 /100 WBCS — SIGNIFICANT CHANGE UP (ref 0–0)
PLATELET # BLD AUTO: 199 K/UL — SIGNIFICANT CHANGE UP (ref 150–400)
POTASSIUM SERPL-MCNC: 3.9 MMOL/L — SIGNIFICANT CHANGE UP (ref 3.5–5.3)
POTASSIUM SERPL-SCNC: 3.9 MMOL/L — SIGNIFICANT CHANGE UP (ref 3.5–5.3)
RBC # BLD: 3.94 M/UL — SIGNIFICANT CHANGE UP (ref 3.8–5.2)
RBC # FLD: 13.1 % — SIGNIFICANT CHANGE UP (ref 10.3–14.5)
SODIUM SERPL-SCNC: 142 MMOL/L — SIGNIFICANT CHANGE UP (ref 135–145)
WBC # BLD: 7.72 K/UL — SIGNIFICANT CHANGE UP (ref 3.8–10.5)
WBC # FLD AUTO: 7.72 K/UL — SIGNIFICANT CHANGE UP (ref 3.8–10.5)

## 2018-09-18 PROCEDURE — 71045 X-RAY EXAM CHEST 1 VIEW: CPT | Mod: 26

## 2018-09-18 PROCEDURE — 74019 RADEX ABDOMEN 2 VIEWS: CPT | Mod: 26

## 2018-09-18 RX ORDER — ONDANSETRON 8 MG/1
4 TABLET, FILM COATED ORAL ONCE
Qty: 0 | Refills: 0 | Status: COMPLETED | OUTPATIENT
Start: 2018-09-18 | End: 2018-09-18

## 2018-09-18 RX ORDER — SODIUM CHLORIDE 9 MG/ML
1000 INJECTION INTRAMUSCULAR; INTRAVENOUS; SUBCUTANEOUS
Qty: 0 | Refills: 0 | Status: DISCONTINUED | OUTPATIENT
Start: 2018-09-18 | End: 2018-09-19

## 2018-09-18 RX ORDER — LIDOCAINE 4 G/100G
1 CREAM TOPICAL ONCE
Qty: 0 | Refills: 0 | Status: COMPLETED | OUTPATIENT
Start: 2018-09-18 | End: 2018-09-18

## 2018-09-18 RX ORDER — MAGNESIUM HYDROXIDE 400 MG/1
30 TABLET, CHEWABLE ORAL DAILY
Qty: 0 | Refills: 0 | Status: DISCONTINUED | OUTPATIENT
Start: 2018-09-18 | End: 2018-09-19

## 2018-09-18 RX ORDER — GLYCERIN ADULT
1 SUPPOSITORY, RECTAL RECTAL ONCE
Qty: 0 | Refills: 0 | Status: COMPLETED | OUTPATIENT
Start: 2018-09-18 | End: 2018-09-18

## 2018-09-18 RX ADMIN — ONDANSETRON 4 MILLIGRAM(S): 8 TABLET, FILM COATED ORAL at 03:14

## 2018-09-18 RX ADMIN — HEPARIN SODIUM 5000 UNIT(S): 5000 INJECTION INTRAVENOUS; SUBCUTANEOUS at 08:23

## 2018-09-18 RX ADMIN — LIDOCAINE 1 APPLICATION(S): 4 CREAM TOPICAL at 20:22

## 2018-09-18 RX ADMIN — HEPARIN SODIUM 5000 UNIT(S): 5000 INJECTION INTRAVENOUS; SUBCUTANEOUS at 17:02

## 2018-09-18 RX ADMIN — ONDANSETRON 4 MILLIGRAM(S): 8 TABLET, FILM COATED ORAL at 09:00

## 2018-09-18 RX ADMIN — Medication 100 MILLIGRAM(S): at 13:11

## 2018-09-18 RX ADMIN — ONDANSETRON 4 MILLIGRAM(S): 8 TABLET, FILM COATED ORAL at 18:52

## 2018-09-18 RX ADMIN — MAGNESIUM HYDROXIDE 30 MILLILITER(S): 400 TABLET, CHEWABLE ORAL at 13:09

## 2018-09-18 RX ADMIN — CARVEDILOL PHOSPHATE 3.12 MILLIGRAM(S): 80 CAPSULE, EXTENDED RELEASE ORAL at 17:02

## 2018-09-18 NOTE — CHART NOTE - NSCHARTNOTEFT_GEN_A_CORE
Assessment:   *s/p omentectomy, lysis of adhesions for small bowel obstruction on 9/15 with N/V on 9/16 likely 2/2 post op ileus.  *pt currently NPO s/p N/V s/p surgery.  Pt was consuming ~75% of full liquid tray prior to episode of N/V.  Pt meeting >75% of estimated nutr needs with ensure enlive on full liquid diet.    *pt with trace generalized edema.  *pt reports passing gas now.     Recommendations:  1) as feasible, advance diet to low fiber and monitor PO intake/tolerance  2) add MVI with minerals daily to ensure 100% RDI met  3) obtain new wt  4) monitor hydration status      Diet Presciption: Diet, NPO:   Except Medications (09-18-18 @ 03:37)      Wt Hx:  Weight (kg): 57.6 (08-19-18 @ 20:18)    Estimated Needs:   [x] no change since previous assessment  Estimated Energy Needs (25-30 calories/kg):  · Weight  (lbs)	125.6 lb  · Weight (kg)	57 kg  · From (25cal/kg)	1425  · To (30cal/kg)	1710     Other Calculation:  · Other Calculation	Ht. 62 " (per pt)     Wt. 56.8 Kg       23 BMI       IBW 50  Kg      Pt is at 114 %  IBW     Estimated Protein Needs (1.2-1.4 gm/kg):  · Weight  (lbs)	125.6  · Weight (kg)	57 kg  · From (1.2 g/kg)	68.4  · To (1.4 g/kg)	79.8    Nutrition Diagnostic #1:  · Nutrition Diagnostic Terminology #1: Nutrient  · Nutrient: Malnutrition  · Etiology: altered GI function  · Signs/Symptoms: moderate muscle/fat wasting, 14% wt loss x 14 mo; likely meeting <75% of ENN  · Nutrition Intervention: Medical Food Supplements  · Medical and Food Supplements: Commercial beverage  · Goal/Expected Outcome: pt meet >80% of estimated nutr needs to maintain wt    Nutrition Diagnosis is [x] ongoing  [ ] resolved [ ] not applicable     New Nutrition Diagnosis: [x] not applicable         Pertinent Medications: MEDICATIONS  (STANDING):  carvedilol 3.125 milliGRAM(s) Oral every 12 hours  docusate sodium 100 milliGRAM(s) Oral three times a day  glycerin Suppository - Adult 1 Suppository(s) Rectal once  heparin  Injectable 5000 Unit(s) SubCutaneous every 12 hours  losartan 25 milliGRAM(s) Oral daily  magnesium hydroxide Suspension 30 milliLiter(s) Oral daily  senna 2 Tablet(s) Oral at bedtime  sodium chloride 0.9%. 1000 milliLiter(s) (75 mL/Hr) IV Continuous <Continuous>    MEDICATIONS  (PRN):  acetaminophen   Tablet .. 650 milliGRAM(s) Oral every 6 hours PRN Temp greater or equal to 38C (100.4F), Mild Pain (1 - 3)  naloxone Injectable 0.1 milliGRAM(s) IV Push every 3 minutes PRN For ANY of the following changes in patient status:  A. RR LESS THAN 10 breaths per minute, B. Oxygen saturation LESS THAN 90%, C. Sedation score of 6  ondansetron Injectable 4 milliGRAM(s) IV Push every 6 hours PRN Nausea  ondansetron Injectable 4 milliGRAM(s) IV Push every 6 hours PRN Nausea  oxyCODONE    5 mG/acetaminophen 325 mG 1 Tablet(s) Oral every 4 hours PRN Mild Pain (1 - 3)  oxyCODONE    5 mG/acetaminophen 325 mG 2 Tablet(s) Oral every 4 hours PRN Moderate Pain (4 - 6)    Pertinent Labs: 09-18 Na142 mmol/L Glu 160 mg/dL<H> K+ 3.9 mmol/L Cr  0.62 mg/dL BUN 19 mg/dL 09-17 Phos 1.8 mg/dL<L> 09-14 [id number] 5.1 %    Skin: morales score = 19  no pressure ulcers    Monitoring and Evaluation:   [x] PO intake/Nutr support infusion [ x ] Tolerance to Nutr [ x ] weights [ x ] labs[ x ] follow up per protocol  [ ] other:

## 2018-09-19 LAB
ADD ON TEST-SPECIMEN IN LAB: SIGNIFICANT CHANGE UP
ADD ON TEST-SPECIMEN IN LAB: SIGNIFICANT CHANGE UP
ANION GAP SERPL CALC-SCNC: 4 MMOL/L — LOW (ref 5–17)
BUN SERPL-MCNC: 28 MG/DL — HIGH (ref 7–23)
CALCIUM SERPL-MCNC: 9.6 MG/DL — SIGNIFICANT CHANGE UP (ref 8.5–10.1)
CHLORIDE SERPL-SCNC: 108 MMOL/L — SIGNIFICANT CHANGE UP (ref 96–108)
CO2 SERPL-SCNC: 34 MMOL/L — HIGH (ref 22–31)
CREAT SERPL-MCNC: 0.7 MG/DL — SIGNIFICANT CHANGE UP (ref 0.5–1.3)
GLUCOSE SERPL-MCNC: 101 MG/DL — HIGH (ref 70–99)
HCT VFR BLD CALC: 30.4 % — LOW (ref 34.5–45)
HGB BLD-MCNC: 9.9 G/DL — LOW (ref 11.5–15.5)
MAGNESIUM SERPL-MCNC: 2 MG/DL — SIGNIFICANT CHANGE UP (ref 1.6–2.6)
MCHC RBC-ENTMCNC: 30.6 PG — SIGNIFICANT CHANGE UP (ref 27–34)
MCHC RBC-ENTMCNC: 32.6 GM/DL — SIGNIFICANT CHANGE UP (ref 32–36)
MCV RBC AUTO: 93.8 FL — SIGNIFICANT CHANGE UP (ref 80–100)
NRBC # BLD: 0 /100 WBCS — SIGNIFICANT CHANGE UP (ref 0–0)
PHOSPHATE SERPL-MCNC: 1.9 MG/DL — LOW (ref 2.5–4.5)
PLATELET # BLD AUTO: 183 K/UL — SIGNIFICANT CHANGE UP (ref 150–400)
POTASSIUM SERPL-MCNC: 3.6 MMOL/L — SIGNIFICANT CHANGE UP (ref 3.5–5.3)
POTASSIUM SERPL-SCNC: 3.6 MMOL/L — SIGNIFICANT CHANGE UP (ref 3.5–5.3)
RBC # BLD: 3.24 M/UL — LOW (ref 3.8–5.2)
RBC # FLD: 13.5 % — SIGNIFICANT CHANGE UP (ref 10.3–14.5)
SODIUM SERPL-SCNC: 146 MMOL/L — HIGH (ref 135–145)
SURGICAL PATHOLOGY FINAL REPORT - CH: SIGNIFICANT CHANGE UP
WBC # BLD: 6.05 K/UL — SIGNIFICANT CHANGE UP (ref 3.8–10.5)
WBC # FLD AUTO: 6.05 K/UL — SIGNIFICANT CHANGE UP (ref 3.8–10.5)

## 2018-09-19 PROCEDURE — 74019 RADEX ABDOMEN 2 VIEWS: CPT | Mod: 26

## 2018-09-19 RX ORDER — PANTOPRAZOLE SODIUM 20 MG/1
40 TABLET, DELAYED RELEASE ORAL DAILY
Qty: 0 | Refills: 0 | Status: DISCONTINUED | OUTPATIENT
Start: 2018-09-19 | End: 2018-09-23

## 2018-09-19 RX ORDER — DEXTROSE MONOHYDRATE, SODIUM CHLORIDE, AND POTASSIUM CHLORIDE 50; .745; 4.5 G/1000ML; G/1000ML; G/1000ML
1000 INJECTION, SOLUTION INTRAVENOUS
Qty: 0 | Refills: 0 | Status: DISCONTINUED | OUTPATIENT
Start: 2018-09-19 | End: 2018-09-20

## 2018-09-19 RX ORDER — POTASSIUM PHOSPHATE, MONOBASIC POTASSIUM PHOSPHATE, DIBASIC 236; 224 MG/ML; MG/ML
15 INJECTION, SOLUTION INTRAVENOUS ONCE
Qty: 0 | Refills: 0 | Status: COMPLETED | OUTPATIENT
Start: 2018-09-19 | End: 2018-09-19

## 2018-09-19 RX ADMIN — SODIUM CHLORIDE 75 MILLILITER(S): 9 INJECTION INTRAMUSCULAR; INTRAVENOUS; SUBCUTANEOUS at 02:12

## 2018-09-19 RX ADMIN — CARVEDILOL PHOSPHATE 3.12 MILLIGRAM(S): 80 CAPSULE, EXTENDED RELEASE ORAL at 17:33

## 2018-09-19 RX ADMIN — POTASSIUM PHOSPHATE, MONOBASIC POTASSIUM PHOSPHATE, DIBASIC 62.5 MILLIMOLE(S): 236; 224 INJECTION, SOLUTION INTRAVENOUS at 11:37

## 2018-09-19 RX ADMIN — HEPARIN SODIUM 5000 UNIT(S): 5000 INJECTION INTRAVENOUS; SUBCUTANEOUS at 05:41

## 2018-09-19 RX ADMIN — HEPARIN SODIUM 5000 UNIT(S): 5000 INJECTION INTRAVENOUS; SUBCUTANEOUS at 17:33

## 2018-09-19 RX ADMIN — LOSARTAN POTASSIUM 25 MILLIGRAM(S): 100 TABLET, FILM COATED ORAL at 05:41

## 2018-09-19 RX ADMIN — CARVEDILOL PHOSPHATE 3.12 MILLIGRAM(S): 80 CAPSULE, EXTENDED RELEASE ORAL at 05:41

## 2018-09-19 RX ADMIN — Medication 100 MILLIGRAM(S): at 05:41

## 2018-09-19 RX ADMIN — PANTOPRAZOLE SODIUM 40 MILLIGRAM(S): 20 TABLET, DELAYED RELEASE ORAL at 11:38

## 2018-09-19 RX ADMIN — DEXTROSE MONOHYDRATE, SODIUM CHLORIDE, AND POTASSIUM CHLORIDE 75 MILLILITER(S): 50; .745; 4.5 INJECTION, SOLUTION INTRAVENOUS at 23:44

## 2018-09-19 RX ADMIN — DEXTROSE MONOHYDRATE, SODIUM CHLORIDE, AND POTASSIUM CHLORIDE 75 MILLILITER(S): 50; .745; 4.5 INJECTION, SOLUTION INTRAVENOUS at 09:27

## 2018-09-19 NOTE — PROGRESS NOTE ADULT - GIT ABD PE PAL DETAILS PC
bowel sounds hypoactive
tender/bowel sounds hypoactive
tender

## 2018-09-20 LAB
ANION GAP SERPL CALC-SCNC: 5 MMOL/L — SIGNIFICANT CHANGE UP (ref 5–17)
BUN SERPL-MCNC: 24 MG/DL — HIGH (ref 7–23)
CALCIUM SERPL-MCNC: 8.8 MG/DL — SIGNIFICANT CHANGE UP (ref 8.5–10.1)
CHLORIDE SERPL-SCNC: 110 MMOL/L — HIGH (ref 96–108)
CO2 SERPL-SCNC: 31 MMOL/L — SIGNIFICANT CHANGE UP (ref 22–31)
CREAT SERPL-MCNC: 0.57 MG/DL — SIGNIFICANT CHANGE UP (ref 0.5–1.3)
GLUCOSE SERPL-MCNC: 105 MG/DL — HIGH (ref 70–99)
HCT VFR BLD CALC: 27.3 % — LOW (ref 34.5–45)
HGB BLD-MCNC: 8.8 G/DL — LOW (ref 11.5–15.5)
MAGNESIUM SERPL-MCNC: 2.1 MG/DL — SIGNIFICANT CHANGE UP (ref 1.6–2.6)
MCHC RBC-ENTMCNC: 30.4 PG — SIGNIFICANT CHANGE UP (ref 27–34)
MCHC RBC-ENTMCNC: 32.2 GM/DL — SIGNIFICANT CHANGE UP (ref 32–36)
MCV RBC AUTO: 94.5 FL — SIGNIFICANT CHANGE UP (ref 80–100)
NRBC # BLD: 0 /100 WBCS — SIGNIFICANT CHANGE UP (ref 0–0)
PHOSPHATE SERPL-MCNC: 2.1 MG/DL — LOW (ref 2.5–4.5)
PLATELET # BLD AUTO: 168 K/UL — SIGNIFICANT CHANGE UP (ref 150–400)
POTASSIUM SERPL-MCNC: 3.5 MMOL/L — SIGNIFICANT CHANGE UP (ref 3.5–5.3)
POTASSIUM SERPL-SCNC: 3.5 MMOL/L — SIGNIFICANT CHANGE UP (ref 3.5–5.3)
RBC # BLD: 2.89 M/UL — LOW (ref 3.8–5.2)
RBC # FLD: 13.3 % — SIGNIFICANT CHANGE UP (ref 10.3–14.5)
SODIUM SERPL-SCNC: 146 MMOL/L — HIGH (ref 135–145)
WBC # BLD: 4.64 K/UL — SIGNIFICANT CHANGE UP (ref 3.8–10.5)
WBC # FLD AUTO: 4.64 K/UL — SIGNIFICANT CHANGE UP (ref 3.8–10.5)

## 2018-09-20 PROCEDURE — 74019 RADEX ABDOMEN 2 VIEWS: CPT | Mod: 26

## 2018-09-20 RX ORDER — POTASSIUM PHOSPHATE, MONOBASIC POTASSIUM PHOSPHATE, DIBASIC 236; 224 MG/ML; MG/ML
15 INJECTION, SOLUTION INTRAVENOUS ONCE
Qty: 0 | Refills: 0 | Status: COMPLETED | OUTPATIENT
Start: 2018-09-20 | End: 2018-09-20

## 2018-09-20 RX ORDER — DEXTROSE MONOHYDRATE, SODIUM CHLORIDE, AND POTASSIUM CHLORIDE 50; .745; 4.5 G/1000ML; G/1000ML; G/1000ML
1000 INJECTION, SOLUTION INTRAVENOUS
Qty: 0 | Refills: 0 | Status: DISCONTINUED | OUTPATIENT
Start: 2018-09-20 | End: 2018-09-21

## 2018-09-20 RX ADMIN — CARVEDILOL PHOSPHATE 3.12 MILLIGRAM(S): 80 CAPSULE, EXTENDED RELEASE ORAL at 17:11

## 2018-09-20 RX ADMIN — HEPARIN SODIUM 5000 UNIT(S): 5000 INJECTION INTRAVENOUS; SUBCUTANEOUS at 06:07

## 2018-09-20 RX ADMIN — HEPARIN SODIUM 5000 UNIT(S): 5000 INJECTION INTRAVENOUS; SUBCUTANEOUS at 17:11

## 2018-09-20 RX ADMIN — PANTOPRAZOLE SODIUM 40 MILLIGRAM(S): 20 TABLET, DELAYED RELEASE ORAL at 11:41

## 2018-09-20 RX ADMIN — POTASSIUM PHOSPHATE, MONOBASIC POTASSIUM PHOSPHATE, DIBASIC 62.5 MILLIMOLE(S): 236; 224 INJECTION, SOLUTION INTRAVENOUS at 09:58

## 2018-09-21 LAB
ANION GAP SERPL CALC-SCNC: 6 MMOL/L — SIGNIFICANT CHANGE UP (ref 5–17)
BUN SERPL-MCNC: 17 MG/DL — SIGNIFICANT CHANGE UP (ref 7–23)
CALCIUM SERPL-MCNC: 9 MG/DL — SIGNIFICANT CHANGE UP (ref 8.5–10.1)
CHLORIDE SERPL-SCNC: 106 MMOL/L — SIGNIFICANT CHANGE UP (ref 96–108)
CO2 SERPL-SCNC: 29 MMOL/L — SIGNIFICANT CHANGE UP (ref 22–31)
CREAT SERPL-MCNC: 0.53 MG/DL — SIGNIFICANT CHANGE UP (ref 0.5–1.3)
GLUCOSE SERPL-MCNC: 105 MG/DL — HIGH (ref 70–99)
HCT VFR BLD CALC: 27.6 % — LOW (ref 34.5–45)
HGB BLD-MCNC: 9 G/DL — LOW (ref 11.5–15.5)
MAGNESIUM SERPL-MCNC: 1.9 MG/DL — SIGNIFICANT CHANGE UP (ref 1.6–2.6)
MCHC RBC-ENTMCNC: 31 PG — SIGNIFICANT CHANGE UP (ref 27–34)
MCHC RBC-ENTMCNC: 32.6 GM/DL — SIGNIFICANT CHANGE UP (ref 32–36)
MCV RBC AUTO: 95.2 FL — SIGNIFICANT CHANGE UP (ref 80–100)
NRBC # BLD: 0 /100 WBCS — SIGNIFICANT CHANGE UP (ref 0–0)
PHOSPHATE SERPL-MCNC: 2.4 MG/DL — LOW (ref 2.5–4.5)
PLATELET # BLD AUTO: 168 K/UL — SIGNIFICANT CHANGE UP (ref 150–400)
POTASSIUM SERPL-MCNC: 3.5 MMOL/L — SIGNIFICANT CHANGE UP (ref 3.5–5.3)
POTASSIUM SERPL-SCNC: 3.5 MMOL/L — SIGNIFICANT CHANGE UP (ref 3.5–5.3)
RBC # BLD: 2.9 M/UL — LOW (ref 3.8–5.2)
RBC # FLD: 12.9 % — SIGNIFICANT CHANGE UP (ref 10.3–14.5)
SODIUM SERPL-SCNC: 141 MMOL/L — SIGNIFICANT CHANGE UP (ref 135–145)
WBC # BLD: 3.62 K/UL — LOW (ref 3.8–10.5)
WBC # FLD AUTO: 3.62 K/UL — LOW (ref 3.8–10.5)

## 2018-09-21 RX ORDER — POTASSIUM CHLORIDE 20 MEQ
40 PACKET (EA) ORAL EVERY 4 HOURS
Qty: 0 | Refills: 0 | Status: COMPLETED | OUTPATIENT
Start: 2018-09-21 | End: 2018-09-21

## 2018-09-21 RX ADMIN — Medication 650 MILLIGRAM(S): at 05:20

## 2018-09-21 RX ADMIN — PANTOPRAZOLE SODIUM 40 MILLIGRAM(S): 20 TABLET, DELAYED RELEASE ORAL at 11:31

## 2018-09-21 RX ADMIN — CARVEDILOL PHOSPHATE 3.12 MILLIGRAM(S): 80 CAPSULE, EXTENDED RELEASE ORAL at 19:06

## 2018-09-21 RX ADMIN — LOSARTAN POTASSIUM 25 MILLIGRAM(S): 100 TABLET, FILM COATED ORAL at 05:20

## 2018-09-21 RX ADMIN — DEXTROSE MONOHYDRATE, SODIUM CHLORIDE, AND POTASSIUM CHLORIDE 75 MILLILITER(S): 50; .745; 4.5 INJECTION, SOLUTION INTRAVENOUS at 06:10

## 2018-09-21 RX ADMIN — CARVEDILOL PHOSPHATE 3.12 MILLIGRAM(S): 80 CAPSULE, EXTENDED RELEASE ORAL at 05:20

## 2018-09-21 RX ADMIN — HEPARIN SODIUM 5000 UNIT(S): 5000 INJECTION INTRAVENOUS; SUBCUTANEOUS at 05:20

## 2018-09-21 RX ADMIN — Medication 62.5 MILLIMOLE(S): at 10:56

## 2018-09-21 RX ADMIN — Medication 40 MILLIEQUIVALENT(S): at 09:48

## 2018-09-21 RX ADMIN — HEPARIN SODIUM 5000 UNIT(S): 5000 INJECTION INTRAVENOUS; SUBCUTANEOUS at 19:06

## 2018-09-21 RX ADMIN — Medication 650 MILLIGRAM(S): at 21:34

## 2018-09-21 RX ADMIN — Medication 40 MILLIEQUIVALENT(S): at 19:06

## 2018-09-21 RX ADMIN — Medication 650 MILLIGRAM(S): at 22:15

## 2018-09-21 NOTE — PROGRESS NOTE ADULT - NECK DETAILS
supple/no JVD
supple/no JVD
no JVD/supple
no JVD/supple
supple/no JVD
no JVD/supple

## 2018-09-21 NOTE — PROGRESS NOTE ADULT - NEGATIVE GENERAL SYMPTOMS
no fever/no chills
no fever/no chills/no sweating
no fever/no chills
no fever/no chills/no sweating
no chills/no fever

## 2018-09-21 NOTE — PROGRESS NOTE ADULT - RS GEN PE MLT RESP DETAILS PC
respirations non-labored/clear to auscultation bilaterally/breath sounds equal
breath sounds equal/respirations non-labored
no wheezes/respirations non-labored/breath sounds equal/no rales/no rhonchi
respirations non-labored/breath sounds equal
no rales/no rhonchi/no wheezes/diminished breath sounds, R/breath sounds equal/respirations non-labored/diminished breath sounds, L
respirations non-labored/clear to auscultation bilaterally/breath sounds equal
breath sounds equal/respirations non-labored/clear to auscultation bilaterally
diminished breath sounds, L/no rales/breath sounds equal/no rhonchi/no wheezes/diminished breath sounds, R/respirations non-labored
respirations non-labored/breath sounds equal

## 2018-09-21 NOTE — PROGRESS NOTE ADULT - GASTROINTESTINAL DETAILS
no distention/soft
soft/bowel sounds normal/no distention/nontender
soft/bowel sounds normal/nontender/no distention
soft/no distention
soft/no distention
no distention/bowel sounds normal/soft/nontender
no distention/soft
bowel sounds normal/soft/no distention
soft/bowel sounds normal/no distention

## 2018-09-21 NOTE — PROGRESS NOTE ADULT - MS EXT PE MLT D E PC
no clubbing/no cyanosis
no cyanosis/no clubbing
no clubbing/no cyanosis
no cyanosis/no clubbing
no clubbing/no cyanosis

## 2018-09-21 NOTE — PROGRESS NOTE ADULT - PSYCHIATRIC DETAILS
normal behavior/normal affect
normal affect/normal behavior
normal behavior/normal affect
normal affect/normal behavior
normal affect/normal behavior
normal behavior/normal affect
normal affect/normal behavior
normal affect/normal behavior
normal behavior/normal affect

## 2018-09-21 NOTE — PROGRESS NOTE ADULT - RESPIRATORY COMMENTS
poor inspiratory
poor inspiratory efforts blt
poor inspiratory efforts blt
poor inspiratory effort blt
poor inspiratory efforts blt
poor inspiratory efforts blt

## 2018-09-21 NOTE — PROGRESS NOTE ADULT - CARDIOVASCULAR DETAILS
positive S1/positive S2
WDL
positive S1/positive S2
positive S2/positive S1
bradycardia/positive S2/positive S1
positive S1/positive S2

## 2018-09-21 NOTE — PROGRESS NOTE ADULT - CONSTITUTIONAL COMMENTS
In NAD
Older female in NAD
In NAD
Older female in NAD
Sitting up in bed in NAD
Older female in NAD
In NAD

## 2018-09-21 NOTE — PROGRESS NOTE ADULT - GIT GEN HX ROS MEA POS PC
abdominal pain

## 2018-09-22 LAB
ANION GAP SERPL CALC-SCNC: 5 MMOL/L — SIGNIFICANT CHANGE UP (ref 5–17)
BUN SERPL-MCNC: 11 MG/DL — SIGNIFICANT CHANGE UP (ref 7–23)
CALCIUM SERPL-MCNC: 9.3 MG/DL — SIGNIFICANT CHANGE UP (ref 8.5–10.1)
CHLORIDE SERPL-SCNC: 109 MMOL/L — HIGH (ref 96–108)
CO2 SERPL-SCNC: 29 MMOL/L — SIGNIFICANT CHANGE UP (ref 22–31)
CREAT SERPL-MCNC: 0.6 MG/DL — SIGNIFICANT CHANGE UP (ref 0.5–1.3)
GLUCOSE SERPL-MCNC: 97 MG/DL — SIGNIFICANT CHANGE UP (ref 70–99)
HCT VFR BLD CALC: 29.7 % — LOW (ref 34.5–45)
HGB BLD-MCNC: 9.9 G/DL — LOW (ref 11.5–15.5)
MAGNESIUM SERPL-MCNC: 2 MG/DL — SIGNIFICANT CHANGE UP (ref 1.6–2.6)
MCHC RBC-ENTMCNC: 30.8 PG — SIGNIFICANT CHANGE UP (ref 27–34)
MCHC RBC-ENTMCNC: 33.3 GM/DL — SIGNIFICANT CHANGE UP (ref 32–36)
MCV RBC AUTO: 92.5 FL — SIGNIFICANT CHANGE UP (ref 80–100)
NRBC # BLD: 0 /100 WBCS — SIGNIFICANT CHANGE UP (ref 0–0)
PHOSPHATE SERPL-MCNC: 2.7 MG/DL — SIGNIFICANT CHANGE UP (ref 2.5–4.5)
PLATELET # BLD AUTO: 205 K/UL — SIGNIFICANT CHANGE UP (ref 150–400)
POTASSIUM SERPL-MCNC: 4.1 MMOL/L — SIGNIFICANT CHANGE UP (ref 3.5–5.3)
POTASSIUM SERPL-SCNC: 4.1 MMOL/L — SIGNIFICANT CHANGE UP (ref 3.5–5.3)
RBC # BLD: 3.21 M/UL — LOW (ref 3.8–5.2)
RBC # FLD: 12.9 % — SIGNIFICANT CHANGE UP (ref 10.3–14.5)
SODIUM SERPL-SCNC: 143 MMOL/L — SIGNIFICANT CHANGE UP (ref 135–145)
WBC # BLD: 3.76 K/UL — LOW (ref 3.8–10.5)
WBC # FLD AUTO: 3.76 K/UL — LOW (ref 3.8–10.5)

## 2018-09-22 RX ORDER — DOCUSATE SODIUM 100 MG
100 CAPSULE ORAL
Qty: 0 | Refills: 0 | Status: DISCONTINUED | OUTPATIENT
Start: 2018-09-22 | End: 2018-09-23

## 2018-09-22 RX ADMIN — HEPARIN SODIUM 5000 UNIT(S): 5000 INJECTION INTRAVENOUS; SUBCUTANEOUS at 05:37

## 2018-09-22 RX ADMIN — LOSARTAN POTASSIUM 25 MILLIGRAM(S): 100 TABLET, FILM COATED ORAL at 05:37

## 2018-09-22 RX ADMIN — PANTOPRAZOLE SODIUM 40 MILLIGRAM(S): 20 TABLET, DELAYED RELEASE ORAL at 11:30

## 2018-09-22 RX ADMIN — HEPARIN SODIUM 5000 UNIT(S): 5000 INJECTION INTRAVENOUS; SUBCUTANEOUS at 18:29

## 2018-09-22 RX ADMIN — Medication 100 MILLIGRAM(S): at 18:29

## 2018-09-22 RX ADMIN — CARVEDILOL PHOSPHATE 3.12 MILLIGRAM(S): 80 CAPSULE, EXTENDED RELEASE ORAL at 18:29

## 2018-09-22 RX ADMIN — CARVEDILOL PHOSPHATE 3.12 MILLIGRAM(S): 80 CAPSULE, EXTENDED RELEASE ORAL at 05:37

## 2018-09-23 ENCOUNTER — TRANSCRIPTION ENCOUNTER (OUTPATIENT)
Age: 78
End: 2018-09-23

## 2018-09-23 VITALS
DIASTOLIC BLOOD PRESSURE: 63 MMHG | SYSTOLIC BLOOD PRESSURE: 149 MMHG | TEMPERATURE: 98 F | RESPIRATION RATE: 18 BRPM | HEART RATE: 78 BPM | OXYGEN SATURATION: 99 %

## 2018-09-23 LAB
ANION GAP SERPL CALC-SCNC: 7 MMOL/L — SIGNIFICANT CHANGE UP (ref 5–17)
BUN SERPL-MCNC: 11 MG/DL — SIGNIFICANT CHANGE UP (ref 7–23)
CALCIUM SERPL-MCNC: 9.3 MG/DL — SIGNIFICANT CHANGE UP (ref 8.5–10.1)
CHLORIDE SERPL-SCNC: 109 MMOL/L — HIGH (ref 96–108)
CO2 SERPL-SCNC: 28 MMOL/L — SIGNIFICANT CHANGE UP (ref 22–31)
CREAT SERPL-MCNC: 0.56 MG/DL — SIGNIFICANT CHANGE UP (ref 0.5–1.3)
GLUCOSE SERPL-MCNC: 97 MG/DL — SIGNIFICANT CHANGE UP (ref 70–99)
HCT VFR BLD CALC: 32.1 % — LOW (ref 34.5–45)
HGB BLD-MCNC: 10.5 G/DL — LOW (ref 11.5–15.5)
MAGNESIUM SERPL-MCNC: 1.9 MG/DL — SIGNIFICANT CHANGE UP (ref 1.6–2.6)
MCHC RBC-ENTMCNC: 30.4 PG — SIGNIFICANT CHANGE UP (ref 27–34)
MCHC RBC-ENTMCNC: 32.7 GM/DL — SIGNIFICANT CHANGE UP (ref 32–36)
MCV RBC AUTO: 93 FL — SIGNIFICANT CHANGE UP (ref 80–100)
NRBC # BLD: 0 /100 WBCS — SIGNIFICANT CHANGE UP (ref 0–0)
PHOSPHATE SERPL-MCNC: 2.6 MG/DL — SIGNIFICANT CHANGE UP (ref 2.5–4.5)
PLATELET # BLD AUTO: 238 K/UL — SIGNIFICANT CHANGE UP (ref 150–400)
POTASSIUM SERPL-MCNC: 3.7 MMOL/L — SIGNIFICANT CHANGE UP (ref 3.5–5.3)
POTASSIUM SERPL-SCNC: 3.7 MMOL/L — SIGNIFICANT CHANGE UP (ref 3.5–5.3)
RBC # BLD: 3.45 M/UL — LOW (ref 3.8–5.2)
RBC # FLD: 13.2 % — SIGNIFICANT CHANGE UP (ref 10.3–14.5)
SODIUM SERPL-SCNC: 144 MMOL/L — SIGNIFICANT CHANGE UP (ref 135–145)
WBC # BLD: 4.8 K/UL — SIGNIFICANT CHANGE UP (ref 3.8–10.5)
WBC # FLD AUTO: 4.8 K/UL — SIGNIFICANT CHANGE UP (ref 3.8–10.5)

## 2018-09-23 RX ADMIN — LOSARTAN POTASSIUM 25 MILLIGRAM(S): 100 TABLET, FILM COATED ORAL at 05:06

## 2018-09-23 RX ADMIN — Medication 100 MILLIGRAM(S): at 05:06

## 2018-09-23 RX ADMIN — PANTOPRAZOLE SODIUM 40 MILLIGRAM(S): 20 TABLET, DELAYED RELEASE ORAL at 11:11

## 2018-09-23 RX ADMIN — HEPARIN SODIUM 5000 UNIT(S): 5000 INJECTION INTRAVENOUS; SUBCUTANEOUS at 05:06

## 2018-09-23 RX ADMIN — CARVEDILOL PHOSPHATE 3.12 MILLIGRAM(S): 80 CAPSULE, EXTENDED RELEASE ORAL at 05:06

## 2018-09-23 NOTE — PROGRESS NOTE ADULT - ASSESSMENT
A/P - Abd pain, recurrent SBO    Clear liquids today, bowel prep today.  NPO after MN for OR tomorrow.
Assessment:  77 YO F POD1 Diagnostic Lap, exploratory Laparotomy, omentectomy and lysis of adhesions for recurrent SBO    Plan:  Advance to [facility]D  DC mendoza today  Trial of void   Take PCA for Pain control  Incentive Spirometry training needed  OOB to ambulate with assistance  Monitor for return of bowel function and flatus  F/U labs in am    Plan discussed with Dr Nunn
Hx of diverticulitis  ileostomy and reversal  Hx of SBO, abdominal pains    f/u ct today  cards eval and clearance
POD 3 s/p ex lap with KATHERINE. Has nausea and emesis with more abdominal distension today. Likely post op ileus. Will Keep NPO, get abdominal xray. NGT was discussed with patient and she is against having this. If continues to have n/v, will encourage placement of NGT. Ambulate and IS
POD 4 s/p ex lap with KATHERINE.     Has nausea and emesis with more abdominal distension yesterday, NGT placed  Likely post op ileus. Will Keep NPO,  Ambulate and IS  Monitor labs  Replace electrolytes  pain control with non narcotics as able  GI DVt ppx
POD 6 KATHERINE with postop ileus    Begin clear liquid diet.  Encourage ambulation.  Replete lytes.
POD 7 s/p ex lap with KATHERINE. Now having bowel function. Advance to full liquids and as tolerated.     Ambulate and IS  VTE prophylaxis
Pt is a 77 y/o female with hx of HTN, moderate aortic regurgitation, chronic systolic CHF due to RT (ef 40-45% based on echo in 2017), s/p AICD, breast CA, colon CA s/p resection, diverticulitis s/p sigmoidectomy with ileostomy in 2017 which was reversed Feb 2018.  Since than she is having recurrent abdominal pain with recurrent SBO s/p recent hospitalization at [facility] which was treated conservatively.  Due to recurrent abdominal pain she was admitted for probable small bowel resection with KATHERINE.         * Recurrent SBO- s/p KATHERINE and ex-lap complicated by post-op ileus, now resolved.  Pt is having BM's, stop IVF, start diet.   Encourage use of incentive spirometry.  Doing well, robin po  * Hypernatremia-resolved   * Anemia-chronic, monitor, stable. h/h stable today  * Thrombocytopenia-likely due to consumption, resolved   * Chronic Systolic CHF-due to RT, her ef overall improved, s/p AICD, continue coreg and losartan, stable. Compensated  * Proph- heparin  * Disp-OOB, ambulate    * Comm- d/w pt
Pt is a 77 y/o female with hx of HTN, moderate aortic regurgitation, chronic systolic CHF due to RT (ef 40-45% based on echo in 2017), s/p AICD, breast CA, colon CA s/p resection, diverticulitis s/p sigmoidectomy with ileostomy in 2017 which was reversed Feb 2018.  Since than she is having recurrent abdominal pain with recurrent SBO s/p recent hospitalization at [facility] which was treated conservatively.  Due to recurrent abdominal pain she was admitted for probable small bowel resection with KATHERINE.         * Recurrent SBO- s/p KATHERINE and ex-lap complicated by post-op ileus, now resolved.  Pt is having BM's, robin po   Encourage use of incentive spirometry.  Doing well  * Hypernatremia-resolved   * Anemia-chronic, monitor, stable. h/h stable   * Thrombocytopenia-likely due to consumption, resolved   * Chronic Systolic CHF-due to RT, her ef overall improved, s/p AICD, continue coreg and losartan, stable. Compensated  * Proph- heparin  * Disp-OOB, ambulate    * Comm- d/w pt
s/p ex lap, KATHERINE  for recurrent sbo  hypophosphatemia    full liquids  oob, PT  transfer to med surg floor  replace phos  heplock IVF   dc pca and transition to po pain meds  dvt prophylaxis
sbo  s/p ex lap, KATHERINE  postop ileus    clamp ngt  ambulate  AXR this am  ivf
Pt is a 77 y/o female with hx of HTN, moderate aortic regurgitation, chronic systolic CHF due to RT (ef 40-45% based on echo in 2017), s/p AICD, breast CA, colon CA s/p resection, diverticulitis s/p sigmoidectomy with ileostomy in 2017 which was reversed Feb 2018.  Since than she is having recurrent abdominal pain with recurrent SBO s/p recent hospitalization at [facility] which was treated conservatively.  Due to recurrent abdominal pain she was admitted for probable small bowel resection with KATHERINE.         * Recurrent SBO- s/p KATHERINE and ex-lap, now with ngt due to post-op ileus.  Await bowel function to return, continue IVF, encourage use of incentive spirometry, monitor labs.  * Hypernatremia-change to hypotonic IVF, monitor   * Anemia-chronic, monitor, stable.  * Thrombocytopenia-likely due to consumption, resolved   * Chronic Systolic CHF-due to RT, her ef overall improved, s/p AICD, continue coreg and losartan, stable.  * Proph- heparin  * Disp-OOB, ambulate    * Comm- d/w pt, RN
Pt is a 79 y/o female with hx of HTN, moderate aortic regurgitation, chronic systolic CHF due to RT (ef 40-45% based on echo in 2017), s/p AICD, breast CA, colon CA s/p resection, diverticulitis s/p sigmoidectomy with ileostomy in 2017 which was reversed Feb 2018.  Since than she is having recurrent abdominal pain with recurrent SBO s/p recent hospitalization at [facility] which was treated conservatively.  Due to recurrent abdominal pain she was admitted for probable small bowel resection with KATHERINE.         * Recurrent SBO- s/p KATHERINE and ex-lap, now npo due to probable post-op ileus.  Await bowel function to return, abdominal x-ray, may need NGT, encourage use of incentive spirometry, diet per surgery.  Repeat labs.    * Anemia-chronic, monitor, stable.  * Thrombocytopenia-likely due to consumption, monitor for now on heparin, repeat again today.    * Bradycardia-resolved, has AICD in place   * Chronic Systolic CHF-due to RT, her ef overall improved, s/p AICD, continue coreg and losartan, stable.  * Proph- heparin  * Disp-OOB, ambulate    * Comm- d/w pt, RN , Dr Barton
Pt is a 79 y/o female with hx of HTN, moderate aortic regurgitation, chronic systolic CHF due to RT (ef 40-45% based on echo in 2017), s/p AICD, breast CA, colon CA s/p resection, diverticulitis s/p sigmoidectomy with ileostomy in 2017 which was reversed Feb 2018.  Since than she is having recurrent abdominal pain with recurrent SBO s/p recent hospitalization at [facility] which was treated conservatively.  Due to recurrent abdominal pain she was admitted for probable small bowel resection with KATHERINE.         * Recurrent SBO-plans for probable small bowel resection with KATHERINE.  Await cards eval, medically she has intermediate clinical predictors for intermediate risk surgery, once optimized by cardiology, she is medically optimized for her surgery.  Minimize IVF given her h/o CHF, further management per colorectal surgery.  * Anemia-chronic, monitor, stable  * Chronic Systolic CHF-due to RT, her ef overall improved, interrogate AICD, continue coreg and losartan.  Presently euvolemic monitor.  * Proph- heparin  * Disp-OOB, ambulate    * Comm-d/w pt in details, all questions answered, RNJd
Pt is a 77 y/o female with hx of HTN, moderate aortic regurgitation, chronic systolic CHF due to RT (ef 40-45% based on echo in 2017), s/p AICD, breast CA, colon CA s/p resection, diverticulitis s/p sigmoidectomy with ileostomy in 2017 which was reversed Feb 2018.  Since than she is having recurrent abdominal pain with recurrent SBO s/p recent hospitalization at [facility] which was treated conservatively.  Due to recurrent abdominal pain she was admitted for probable small bowel resection with KATHERINE.         * Recurrent SBO-plans for probable small bowel resection with KATHERINE.  Cards eval noted and appreciated, she has intermediate clinical predictors for intermediate risk surgery, optimized for surgery, continue ruth ann-op BB, minimize IVF given her h/o CHF, further management per colorectal surgery.  * Anemia-chronic, monitor, stable  * Chronic Systolic CHF-due to RT, her ef overall improved, s/p AICD, continue coreg and losartan.  Presently euvolemic monitor.  * Proph- heparin  * Disp-OOB, ambulate    * Comm-d/w pt in details, all questions answered, RN
Pt is a 79 y/o female with hx of HTN, moderate aortic regurgitation, chronic systolic CHF due to RT (ef 40-45% based on echo in 2017), s/p AICD, breast CA, colon CA s/p resection, diverticulitis s/p sigmoidectomy with ileostomy in 2017 which was reversed Feb 2018.  Since than she is having recurrent abdominal pain with recurrent SBO s/p recent hospitalization at [facility] which was treated conservatively.  Due to recurrent abdominal pain she was admitted for probable small bowel resection with KATHERINE.         * Recurrent SBO- s/p KATHERINE and ex-lap complicated by post-op ileus, now resolved.  Pt is having BM's, stop IVF, start diet.   Encourage use of incentive spirometry.    * Hypernatremia-resolved   * Anemia-chronic, monitor, stable.  * Thrombocytopenia-likely due to consumption, resolved   * Chronic Systolic CHF-due to RT, her ef overall improved, s/p AICD, continue coreg and losartan, stable.  * Proph- heparin  * Disp-OOB, ambulate    * Comm- d/w Jd benavidez
Pt is a 79 y/o female with hx of HTN, moderate aortic regurgitation, chronic systolic CHF due to RT (ef 40-45% based on echo in 2017), s/p AICD, breast CA, colon CA s/p resection, diverticulitis s/p sigmoidectomy with ileostomy in 2017 which was reversed Feb 2018.  Since than she is having recurrent abdominal pain with recurrent SBO s/p recent hospitalization at [facility] which was treated conservatively.  Due to recurrent abdominal pain she was admitted for probable small bowel resection with KATHERINE.         * Recurrent SBO- s/p KATHERINE and ex-lap, await bowel function to return, pain control, encourage use of incentive spirometry, repeat labs.    * Anemia-chronic, monitor, stable  * Bradycardia-resolved, has AICD in place   * Chronic Systolic CHF-due to RT, her ef overall improved, s/p AICD, continue coreg and losartan.  monitor.  * Proph- heparin  * Disp-OOB, ambulate with PT  * Comm- d/w pt RN
Pt is a 77 y/o female with hx of HTN, moderate aortic regurgitation, chronic systolic CHF due to RT (ef 40-45% based on echo in 2017), s/p AICD, breast CA, colon CA s/p resection, diverticulitis s/p sigmoidectomy with ileostomy in 2017 which was reversed Feb 2018.  Since than she is having recurrent abdominal pain with recurrent SBO s/p recent hospitalization at [facility] which was treated conservatively.  Due to recurrent abdominal pain she was admitted for probable small bowel resection with KATHERINE.         * Recurrent SBO- s/p KATHERINE and ex-lap, continue to monitor in SDU, await bowel function to return, pain control, encourage use of incentive spirometry.  * Anemia-chronic, monitor, stable  * Bradycardia-monitor in SDU, has AICD in place   * Chronic Systolic CHF-due to RT, her ef overall improved, s/p AICD, continue coreg and losartan.  Presently euvolemic monitor, minimize IVF.  * Proph- heparin  * Disp-OOB, ambulate    * Comm- d/w reema, RN
Pt is a 79 y/o female with hx of HTN, moderate aortic regurgitation, chronic systolic CHF due to RT (ef 40-45% based on echo in 2017), s/p AICD, breast CA, colon CA s/p resection, diverticulitis s/p sigmoidectomy with ileostomy in 2017 which was reversed Feb 2018.  Since than she is having recurrent abdominal pain with recurrent SBO s/p recent hospitalization at [facility] which was treated conservatively.  Due to recurrent abdominal pain she was admitted for probable small bowel resection with KATHERINE.         * Recurrent SBO- s/p KATHERINE and ex-lap, now with ngt due to post-op ileus.  Await bowel function to return, continue IVF, encourage use of incentive spirometry, monitor labs, further management per surgery.    * Hypernatremia-remains high, change IVF d5w, monitor   * Anemia-chronic, monitor, stable.  * Thrombocytopenia-likely due to consumption, resolved   * Chronic Systolic CHF-due to RT, her ef overall improved, s/p AICD, continue coreg and losartan, stable.  * Proph- heparin  * Disp-OOB, ambulate    * Comm- d/w pt, RN
Pt is a 77 y/o female with hx of HTN, moderate aortic regurgitation, chronic systolic CHF due to RT (ef 40-45% based on echo in 2017), s/p AICD, breast CA, colon CA s/p resection, diverticulitis s/p sigmoidectomy with ileostomy in 2017 which was reversed Feb 2018.  Since than she is having recurrent abdominal pain with recurrent SBO s/p recent hospitalization at [facility] which was treated conservatively.  Due to recurrent abdominal pain she was admitted for probable small bowel resection with KATHERINE.         * Recurrent SBO- s/p KATHERINE and ex-lap, await bowel function to return, pain control, encourage use of incentive spirometry, diet per surgery   * Anemia-chronic, monitor, stable  * Thrombocytopenia-likely due to consumption, monitor for now on heparin, repeat in am.    * Bradycardia-resolved, has AICD in place   * Chronic Systolic CHF-due to RT, her ef overall improved, s/p AICD, continue coreg and losartan, stable.  * Proph- heparin  * Disp-OOB, ambulate with PT  * Comm- d/w pt, RN

## 2018-09-23 NOTE — DISCHARGE NOTE ADULT - HOSPITAL COURSE
Patient was admitted to the hospital and underwent medical clearance for elective exploratory laparotomy and lysis of adhesions for recurrent small bowel obstruction after a history of diverticulitis with colon resection, diverting ileostomy and eventual ileostomy reversal. She tolerated surgery well. Post operatively, she has an ileus which was managed with bowel rest and NG decompression. She recovered and diet was slowly advanced. She was tolerating a regular diet and having bowel function at the time of discharge.

## 2018-09-23 NOTE — PROGRESS NOTE ADULT - SUBJECTIVE AND OBJECTIVE BOX
Pt is doing well, NGT was removed yesterday, had 3 BMs overnight, having flatus, c/o mild abd pain.    Date of Service: 09-21-18 @ 08:59    Vital Signs Last 24 Hrs  T(C): 36.7 (21 Sep 2018 04:10), Max: 36.8 (20 Sep 2018 20:43)  T(F): 98 (21 Sep 2018 04:10), Max: 98.3 (20 Sep 2018 20:43)  HR: 69 (21 Sep 2018 04:10) (69 - 77)  BP: 134/52 (21 Sep 2018 04:10) (134/52 - 138/54)  BP(mean): --  RR: 17 (21 Sep 2018 04:10) (16 - 17)  SpO2: 96% (21 Sep 2018 04:10) (96% - 99%)    Daily     Daily     I&O's Detail      CAPILLARY BLOOD GLUCOSE                                          9.0    3.62  )-----------( 168      ( 21 Sep 2018 06:09 )             27.6       09-21    141  |  106  |  17  ----------------------------<  105<H>  3.5   |  29  |  0.53    Ca    9.0      21 Sep 2018 06:09  Phos  2.4     09-21  Mg     1.9     09-21                        MEDICATIONS  (STANDING):  carvedilol 3.125 milliGRAM(s) Oral every 12 hours  heparin  Injectable 5000 Unit(s) SubCutaneous every 12 hours  losartan 25 milliGRAM(s) Oral daily  pantoprazole  Injectable 40 milliGRAM(s) IV Push daily  potassium chloride    Tablet ER 40 milliEquivalent(s) Oral every 4 hours  sodium phosphate IVPB 15 milliMole(s) IV Intermittent once    MEDICATIONS  (PRN):  acetaminophen   Tablet .. 650 milliGRAM(s) Oral every 6 hours PRN Temp greater or equal to 38C (100.4F), Mild Pain (1 - 3)  naloxone Injectable 0.1 milliGRAM(s) IV Push every 3 minutes PRN For ANY of the following changes in patient status:  A. RR LESS THAN 10 breaths per minute, B. Oxygen saturation LESS THAN 90%, C. Sedation score of 6  ondansetron Injectable 4 milliGRAM(s) IV Push every 6 hours PRN Nausea  oxyCODONE    5 mG/acetaminophen 325 mG 1 Tablet(s) Oral every 4 hours PRN Mild Pain (1 - 3)  oxyCODONE    5 mG/acetaminophen 325 mG 2 Tablet(s) Oral every 4 hours PRN Moderate Pain (4 - 6)
Pt with uneventful night, AICD interrogation noted, cleared for surgery by cards.  No CP or SOB.    Date of Service: 18 @ 08:01    Vital Signs Last 24 Hrs  T(C): 36.5 (14 Sep 2018 04:47), Max: 36.7 (13 Sep 2018 20:41)  T(F): 97.7 (14 Sep 2018 04:47), Max: 98.1 (13 Sep 2018 20:41)  HR: 67 (14 Sep 2018 04:47) (67 - 84)  BP: 136/64 (14 Sep 2018 04:47) (117/68 - 142/71)  BP(mean): --  RR: 18 (13 Sep 2018 20:41) (18 - 19)  SpO2: 98% (14 Sep 2018 04:47) (98% - 99%)    Daily     Daily Weight in k.9 (13 Sep 2018 11:20)    I&O's Detail      CAPILLARY BLOOD GLUCOSE                                          11.4   5.55  )-----------( 188      ( 12 Sep 2018 16:54 )             34.2           142  |  107  |  11  ----------------------------<  93  4.1   |  30  |  0.64    Ca    9.3      12 Sep 2018 16:54        PT/INR - ( 12 Sep 2018 16:54 )   PT: 11.7 sec;   INR: 1.08 ratio         PTT - ( 12 Sep 2018 16:54 )  PTT:30.4 sec                MEDICATIONS  (STANDING):  carvedilol 3.125 milliGRAM(s) Oral every 12 hours  docusate sodium 100 milliGRAM(s) Oral three times a day  heparin  Injectable 5000 Unit(s) SubCutaneous every 12 hours  losartan 25 milliGRAM(s) Oral daily  polyethylene glycol/electrolyte Solution. 4000 milliLiter(s) Oral once  senna 2 Tablet(s) Oral at bedtime  sodium chloride 0.9%. 1000 milliLiter(s) (50 mL/Hr) IV Continuous <Continuous>    MEDICATIONS  (PRN):  acetaminophen   Tablet .. 650 milliGRAM(s) Oral every 6 hours PRN Temp greater or equal to 38C (100.4F), Mild Pain (1 - 3)  ondansetron Injectable 4 milliGRAM(s) IV Push every 6 hours PRN Nausea
9/22/18: pt had 3 bms this am, no abd pain, no cp, sob, was nauseated earlier now better      ROS: AS PER HPI OTHERWISE ALL OTHER SYSTEMS REVIEWED AND ARE NEGATIVE    Vital Signs Last 24 Hrs  T(C): 36.5 (22 Sep 2018 03:53), Max: 37.1 (21 Sep 2018 18:28)  T(F): 97.7 (22 Sep 2018 03:53), Max: 98.7 (21 Sep 2018 18:28)  HR: 68 (22 Sep 2018 05:35) (64 - 84)  BP: 127/61 (22 Sep 2018 05:35) (127/61 - 146/82)  BP(mean): --  RR: 17 (22 Sep 2018 03:53) (17 - 18)  SpO2: 98% (22 Sep 2018 03:53) (97% - 98%)    GEN: A and O, NAD, mood stable, FRAIL  HEENT:   NC/AT, EOMI, no oropharyngeal lesions    NECK:   supple    CV:  +S1, +S2, regular, no murmurs or rubs    RESP:   lungs clear to auscultation bilaterally, no wheezing, rales, rhonchi, good air entry bilaterally    GI:  abdomen soft, non-tender, non-distended, normal BS, INCISION C/D/I, STAPLES INTACT, no abdominal masses, no palpable masses    RECTAL:  not examined    :  not examined    MSK:   normal muscle tone, no atrophy, no rigidity, no contractions    EXT:   no clubbing, no cyanosis, no edema, no calf pain, swelling or erythema    VASCULAR:  pulses equal and symmetric in the upper and lower extremities    NEURO:  AAOX3, no focal neurological deficits, follows all commands, able to move extremities spontaneously    SKIN:  no ulcers, lesions or rashes                                        9.0    3.62  )-----------( 168      ( 21 Sep 2018 06:09 )             27.6       09-21    141  |  106  |  17  ----------------------------<  105<H>  3.5   |  29  |  0.53    Ca    9.0      21 Sep 2018 06:09  Phos  2.4     09-21  Mg     1.9     09-21      MEDICATIONS  (STANDING):  carvedilol 3.125 milliGRAM(s) Oral every 12 hours  docusate sodium 100 milliGRAM(s) Oral two times a day  heparin  Injectable 5000 Unit(s) SubCutaneous every 12 hours  losartan 25 milliGRAM(s) Oral daily  pantoprazole  Injectable 40 milliGRAM(s) IV Push daily    MEDICATIONS  (PRN):  acetaminophen   Tablet .. 650 milliGRAM(s) Oral every 6 hours PRN Temp greater or equal to 38C (100.4F), Mild Pain (1 - 3)  naloxone Injectable 0.1 milliGRAM(s) IV Push every 3 minutes PRN For ANY of the following changes in patient status:  A. RR LESS THAN 10 breaths per minute, B. Oxygen saturation LESS THAN 90%, C. Sedation score of 6  ondansetron Injectable 4 milliGRAM(s) IV Push every 6 hours PRN Nausea  oxyCODONE    5 mG/acetaminophen 325 mG 1 Tablet(s) Oral every 4 hours PRN Mild Pain (1 - 3)  oxyCODONE    5 mG/acetaminophen 325 mG 2 Tablet(s) Oral every 4 hours PRN Moderate Pain (4 - 6)
9/22/18: pt had 3 bms this am, no abd pain, no cp, sob, was nauseated earlier now better  9/23/18: No cp, sob, n/v/f/c; robin po this am; having bms, abd pain well controlled    ROS: AS PER HPI OTHERWISE ALL OTHER SYSTEMS REVIEWED AND ARE NEGATIVE    Vital Signs Last 24 Hrs  T(C): 36.8 (23 Sep 2018 04:45), Max: 37.2 (22 Sep 2018 13:57)  T(F): 98.2 (23 Sep 2018 04:45), Max: 98.9 (22 Sep 2018 13:57)  HR: 77 (23 Sep 2018 04:45) (77 - 91)  BP: 128/90 (23 Sep 2018 04:45) (118/48 - 149/74)  BP(mean): --  RR: 14 (23 Sep 2018 04:45) (14 - 18)  SpO2: 98% (23 Sep 2018 04:45) (98% - 99%)    GEN: A and O, NAD, mood stable, FRAIL  HEENT:   NC/AT, EOMI, no oropharyngeal lesions    NECK:   supple    CV:  +S1, +S2, regular, no murmurs or rubs    RESP:   lungs clear to auscultation bilaterally, no wheezing, rales, rhonchi, good air entry bilaterally    GI:  abdomen soft, non-tender, non-distended, normal BS, INCISION C/D/I, STAPLES INTACT, no abdominal masses, no palpable masses    RECTAL:  not examined    :  not examined    MSK:   normal muscle tone, no atrophy, no rigidity, no contractions    EXT:   no clubbing, no cyanosis, no edema, no calf pain, swelling or erythema    VASCULAR:  pulses equal and symmetric in the upper and lower extremities    NEURO:  AAOX3, no focal neurological deficits, follows all commands, able to move extremities spontaneously    SKIN:  no ulcers, lesions or rashes    labs                            10.5   4.80  )-----------( 238      ( 23 Sep 2018 05:42 )             32.1     09-23    144  |  109<H>  |  11  ----------------------------<  97  3.7   |  28  |  0.56    Ca    9.3      23 Sep 2018 05:42  Phos  2.6     09-23  Mg     1.9     09-23      MEDICATIONS  (STANDING):  carvedilol 3.125 milliGRAM(s) Oral every 12 hours  docusate sodium 100 milliGRAM(s) Oral two times a day  heparin  Injectable 5000 Unit(s) SubCutaneous every 12 hours  losartan 25 milliGRAM(s) Oral daily  pantoprazole  Injectable 40 milliGRAM(s) IV Push daily    MEDICATIONS  (PRN):  acetaminophen   Tablet .. 650 milliGRAM(s) Oral every 6 hours PRN Temp greater or equal to 38C (100.4F), Mild Pain (1 - 3)  naloxone Injectable 0.1 milliGRAM(s) IV Push every 3 minutes PRN For ANY of the following changes in patient status:  A. RR LESS THAN 10 breaths per minute, B. Oxygen saturation LESS THAN 90%, C. Sedation score of 6  ondansetron Injectable 4 milliGRAM(s) IV Push every 6 hours PRN Nausea  oxyCODONE    5 mG/acetaminophen 325 mG 1 Tablet(s) Oral every 4 hours PRN Mild Pain (1 - 3)  oxyCODONE    5 mG/acetaminophen 325 mG 2 Tablet(s) Oral every 4 hours PRN Moderate Pain (4 - 6)
Had nausea and emesis x 2 overnight. Nausea somewhat improved this am.     Exam:  Vital Signs Last 24 Hrs  T(C): 36.4 (18 Sep 2018 05:48), Max: 36.9 (17 Sep 2018 08:33)  T(F): 97.5 (18 Sep 2018 05:48), Max: 98.4 (17 Sep 2018 08:33)  HR: 82 (18 Sep 2018 05:48) (72 - 100)  BP: 116/58 (18 Sep 2018 05:48) (101/44 - 140/76)  BP(mean): 59 (17 Sep 2018 14:01) (57 - 73)  RR: 18 (18 Sep 2018 05:48) (17 - 21)  SpO2: 82% (18 Sep 2018 05:48) (82% - 100%)    General: In no distress  Respiratory: non labored breathing  Abdomen: soft, distended, appropriately tender, incision with prevena dressing  Neuro: alert and oriented x 3                          12.1   7.72  )-----------( 199      ( 18 Sep 2018 08:06 )             36.3   09-17    139  |  108  |  11  ----------------------------<  111<H>  3.8   |  29  |  0.67    Ca    9.3      17 Sep 2018 05:54  Phos  1.8     09-17  Mg     2.0     09-17
NGT removed yesterday afternoon. Three BMs overnight and passed a large amount of flatus this AM. In better spirits.    MEDICATIONS  (STANDING):  carvedilol 3.125 milliGRAM(s) Oral every 12 hours  dextrose 5% with potassium chloride 20 mEq/L 1000 milliLiter(s) (75 mL/Hr) IV Continuous <Continuous>  heparin  Injectable 5000 Unit(s) SubCutaneous every 12 hours  losartan 25 milliGRAM(s) Oral daily  pantoprazole  Injectable 40 milliGRAM(s) IV Push daily    MEDICATIONS  (PRN):  acetaminophen   Tablet .. 650 milliGRAM(s) Oral every 6 hours PRN Temp greater or equal to 38C (100.4F), Mild Pain (1 - 3)  naloxone Injectable 0.1 milliGRAM(s) IV Push every 3 minutes PRN For ANY of the following changes in patient status:  A. RR LESS THAN 10 breaths per minute, B. Oxygen saturation LESS THAN 90%, C. Sedation score of 6  ondansetron Injectable 4 milliGRAM(s) IV Push every 6 hours PRN Nausea  oxyCODONE    5 mG/acetaminophen 325 mG 1 Tablet(s) Oral every 4 hours PRN Mild Pain (1 - 3)  oxyCODONE    5 mG/acetaminophen 325 mG 2 Tablet(s) Oral every 4 hours PRN Moderate Pain (4 - 6)    Vital Signs Last 24 Hrs  T(C): 36.7 (21 Sep 2018 04:10), Max: 36.8 (20 Sep 2018 20:43)  T(F): 98 (21 Sep 2018 04:10), Max: 98.3 (20 Sep 2018 20:43)  HR: 69 (21 Sep 2018 04:10) (69 - 77)  BP: 134/52 (21 Sep 2018 04:10) (134/52 - 138/54)  BP(mean): --  RR: 17 (21 Sep 2018 04:10) (16 - 17)  SpO2: 96% (21 Sep 2018 04:10) (96% - 99%)  I&O's Detail    NAD  ABD exam : prevena in place, soft, NTND                        9.0    3.62  )-----------( 168      ( 21 Sep 2018 06:09 )             27.6     09-21    141  |  106  |  17  ----------------------------<  105<H>  3.5   |  29  |  0.53    Ca    9.0      21 Sep 2018 06:09  Phos  2.4     09-21  Mg     1.9     09-21
No acute events. CT enterography negative for obstruction or inflammation. Optimized from medical and cardiac perspective for surgery.  In bathroom and unavailable at time of visit.    MEDICATIONS  (STANDING):  carvedilol 3.125 milliGRAM(s) Oral every 12 hours  docusate sodium 100 milliGRAM(s) Oral three times a day  heparin  Injectable 5000 Unit(s) SubCutaneous every 12 hours  losartan 25 milliGRAM(s) Oral daily  polyethylene glycol/electrolyte Solution. 4000 milliLiter(s) Oral once  senna 2 Tablet(s) Oral at bedtime  sodium chloride 0.9%. 1000 milliLiter(s) (50 mL/Hr) IV Continuous <Continuous>    MEDICATIONS  (PRN):  acetaminophen   Tablet .. 650 milliGRAM(s) Oral every 6 hours PRN Temp greater or equal to 38C (100.4F), Mild Pain (1 - 3)  ondansetron Injectable 4 milliGRAM(s) IV Push every 6 hours PRN Nausea    Vital Signs Last 24 Hrs  T(C): 36.5 (14 Sep 2018 04:47), Max: 36.7 (13 Sep 2018 20:41)  T(F): 97.7 (14 Sep 2018 04:47), Max: 98.1 (13 Sep 2018 20:41)  HR: 67 (14 Sep 2018 04:47) (67 - 84)  BP: 136/64 (14 Sep 2018 04:47) (117/68 - 142/71)  BP(mean): --  RR: 18 (13 Sep 2018 20:41) (18 - 19)  SpO2: 98% (14 Sep 2018 04:47) (98% - 99%)  I&O's Detail             11.4   5.55  )-----------( 188      ( 12 Sep 2018 16:54 )             34.2     09-12    142  |  107  |  11  ----------------------------<  93  4.1   |  30  |  0.64    Ca    9.3      12 Sep 2018 16:54
POD 2, no n/v, no bowel function yet.  minimal pain   ambulating   voiding    Vital Signs Last 24 Hrs  T(C): 36.8 (17 Sep 2018 05:19), Max: 37.1 (16 Sep 2018 09:12)  T(F): 98.3 (17 Sep 2018 05:19), Max: 98.8 (16 Sep 2018 09:12)  HR: 76 (17 Sep 2018 06:00) (69 - 95)  BP: 115/49 (17 Sep 2018 06:00) (90/42 - 143/76)  BP(mean): 64 (17 Sep 2018 06:00) (53 - 87)  RR: 19 (17 Sep 2018 06:00) (13 - 23)  SpO2: 95% (17 Sep 2018 06:00) (93% - 99%)    NAD  abd soft, Prevena vac in place                          10.5   6.00  )-----------( 136      ( 17 Sep 2018 05:54 )             32.5   09-17    139  |  108  |  11  ----------------------------<  111<H>  3.8   |  29  |  0.67    Ca    9.3      17 Sep 2018 05:54  Phos  1.8     09-17  Mg     2.0     09-17
Pt feels better, still c/o mild abd pain from surgery.   Still no flatus or BM yet.      Date of Service: 09-17-18 @ 08:59    Vital Signs Last 24 Hrs  T(C): 36.8 (17 Sep 2018 05:19), Max: 37.1 (16 Sep 2018 09:12)  T(F): 98.3 (17 Sep 2018 05:19), Max: 98.8 (16 Sep 2018 09:12)  HR: 69 (17 Sep 2018 08:00) (69 - 95)  BP: 103/49 (17 Sep 2018 08:00) (90/42 - 143/76)  BP(mean): 61 (17 Sep 2018 08:00) (53 - 87)  RR: 13 (17 Sep 2018 08:00) (13 - 23)  SpO2: 96% (17 Sep 2018 08:00) (93% - 99%)    Daily     Daily     I&O's Detail    16 Sep 2018 07:01  -  17 Sep 2018 07:00  --------------------------------------------------------  IN:    sodium chloride 0.9%: 1200 mL  Total IN: 1200 mL    OUT:    Indwelling Catheter - Urethral: 400 mL  Total OUT: 400 mL    Total NET: 800 mL          CAPILLARY BLOOD GLUCOSE                                          10.5   6.00  )-----------( 136      ( 17 Sep 2018 05:54 )             32.5       09-17    139  |  108  |  11  ----------------------------<  111<H>  3.8   |  29  |  0.67    Ca    9.3      17 Sep 2018 05:54  Phos  1.8     09-17  Mg     2.0     09-17                        MEDICATIONS  (STANDING):  carvedilol 3.125 milliGRAM(s) Oral every 12 hours  docusate sodium 100 milliGRAM(s) Oral three times a day  heparin  Injectable 5000 Unit(s) SubCutaneous every 12 hours  losartan 25 milliGRAM(s) Oral daily  potassium phosphate / sodium phosphate powder 2 Packet(s) Oral once  senna 2 Tablet(s) Oral at bedtime    MEDICATIONS  (PRN):  acetaminophen   Tablet .. 650 milliGRAM(s) Oral every 6 hours PRN Temp greater or equal to 38C (100.4F), Mild Pain (1 - 3)  naloxone Injectable 0.1 milliGRAM(s) IV Push every 3 minutes PRN For ANY of the following changes in patient status:  A. RR LESS THAN 10 breaths per minute, B. Oxygen saturation LESS THAN 90%, C. Sedation score of 6  ondansetron Injectable 4 milliGRAM(s) IV Push every 6 hours PRN Nausea  ondansetron Injectable 4 milliGRAM(s) IV Push every 6 hours PRN Nausea  oxyCODONE    5 mG/acetaminophen 325 mG 1 Tablet(s) Oral every 4 hours PRN Mild Pain (1 - 3)  oxyCODONE    5 mG/acetaminophen 325 mG 2 Tablet(s) Oral every 4 hours PRN Moderate Pain (4 - 6)
Pt is c/o abd pain from surgery, no CP or SOB.  No flatus or BM.    Date of Service: 09-16-18 @ 09:32    Vital Signs Last 24 Hrs  T(C): 36.9 (16 Sep 2018 05:37), Max: 36.9 (15 Sep 2018 10:30)  T(F): 98.4 (16 Sep 2018 05:37), Max: 98.5 (15 Sep 2018 10:30)  HR: 76 (16 Sep 2018 09:00) (51 - 90)  BP: 111/73 (16 Sep 2018 08:00) (99/58 - 133/51)  BP(mean): 81 (16 Sep 2018 08:00) (57 - 99)  RR: 17 (16 Sep 2018 09:00) (8 - 22)  SpO2: 94% (16 Sep 2018 09:00) (94% - 100%)    Daily     Daily     I&O's Detail    15 Sep 2018 07:01  -  16 Sep 2018 07:00  --------------------------------------------------------  IN:    lactated ringers.: 75 mL    Other: 750 mL    sodium chloride 0.9%.: 950 mL  Total IN: 1775 mL    OUT:    Indwelling Catheter - Urethral: 950 mL    Other: 360 mL  Total OUT: 1310 mL    Total NET: 465 mL      16 Sep 2018 07:01  -  16 Sep 2018 09:32  --------------------------------------------------------  IN:    sodium chloride 0.9%.: 100 mL  Total IN: 100 mL    OUT:  Total OUT: 0 mL    Total NET: 100 mL          CAPILLARY BLOOD GLUCOSE                                                MEDICATIONS  (STANDING):  carvedilol 3.125 milliGRAM(s) Oral every 12 hours  docusate sodium 100 milliGRAM(s) Oral three times a day  heparin  Injectable 5000 Unit(s) SubCutaneous every 12 hours  HYDROmorphone PCA (1 mG/mL) 30 milliLiter(s) PCA Continuous PCA Continuous  losartan 25 milliGRAM(s) Oral daily  senna 2 Tablet(s) Oral at bedtime  sodium chloride 0.9%. 1000 milliLiter(s) (50 mL/Hr) IV Continuous <Continuous>    MEDICATIONS  (PRN):  acetaminophen   Tablet .. 650 milliGRAM(s) Oral every 6 hours PRN Temp greater or equal to 38C (100.4F), Mild Pain (1 - 3)  HYDROmorphone PCA (1 mG/mL) Rescue Clinician Bolus 0.5 milliGRAM(s) IV Push every 15 minutes PRN for Pain Scale GREATER THAN 6  naloxone Injectable 0.1 milliGRAM(s) IV Push every 3 minutes PRN For ANY of the following changes in patient status:  A. RR LESS THAN 10 breaths per minute, B. Oxygen saturation LESS THAN 90%, C. Sedation score of 6  ondansetron Injectable 4 milliGRAM(s) IV Push every 6 hours PRN Nausea  ondansetron Injectable 4 milliGRAM(s) IV Push every 6 hours PRN Nausea
Pt seen in SDU after her surgery for KATHERINE and ex-lap.  Pt c/o abd pain from surgery, no CP or SOB.    Date of Service: 09-15-18 @ 12:04    Vital Signs Last 24 Hrs  T(C): 36.2 (15 Sep 2018 11:30), Max: 36.9 (15 Sep 2018 10:30)  T(F): 97.2 (15 Sep 2018 11:30), Max: 98.5 (15 Sep 2018 10:30)  HR: 54 (15 Sep 2018 11:30) (53 - 68)  BP: 103/40 (15 Sep 2018 11:30) (103/40 - 142/62)  BP(mean): --  RR: 11 (15 Sep 2018 11:30) (11 - 19)  SpO2: 100% (15 Sep 2018 11:30) (95% - 100%)    Daily     Daily     I&O's Detail    15 Sep 2018 07:01  -  15 Sep 2018 12:04  --------------------------------------------------------  IN:    Other: 750 mL  Total IN: 750 mL    OUT:    Other: 360 mL  Total OUT: 360 mL    Total NET: 390 mL          CAPILLARY BLOOD GLUCOSE      POCT Blood Glucose.: 92 mg/dL (15 Sep 2018 06:36)                MEDICATIONS  (STANDING):  carvedilol 3.125 milliGRAM(s) Oral every 12 hours  docusate sodium 100 milliGRAM(s) Oral three times a day  heparin  Injectable 5000 Unit(s) SubCutaneous every 12 hours  HYDROmorphone PCA (1 mG/mL) 30 milliLiter(s) PCA Continuous PCA Continuous  losartan 25 milliGRAM(s) Oral daily  senna 2 Tablet(s) Oral at bedtime  sodium chloride 0.9%. 1000 milliLiter(s) (50 mL/Hr) IV Continuous <Continuous>    MEDICATIONS  (PRN):  acetaminophen   Tablet .. 650 milliGRAM(s) Oral every 6 hours PRN Temp greater or equal to 38C (100.4F), Mild Pain (1 - 3)  HYDROmorphone PCA (1 mG/mL) Rescue Clinician Bolus 0.5 milliGRAM(s) IV Push every 15 minutes PRN for Pain Scale GREATER THAN 6  naloxone Injectable 0.1 milliGRAM(s) IV Push every 3 minutes PRN For ANY of the following changes in patient status:  A. RR LESS THAN 10 breaths per minute, B. Oxygen saturation LESS THAN 90%, C. Sedation score of 6  ondansetron Injectable 4 milliGRAM(s) IV Push every 6 hours PRN Nausea  ondansetron Injectable 4 milliGRAM(s) IV Push every 6 hours PRN Nausea
Pt was seen and examined today with Dr Nunn. Pt stated that she does have a lot of pain. Pt was sitting in the chair during the visit. No nausea or vomiting.    Vital Signs Last 24 Hrs  T(C): 37.1 (16 Sep 2018 09:12), Max: 37.1 (16 Sep 2018 09:12)  T(F): 98.8 (16 Sep 2018 09:12), Max: 98.8 (16 Sep 2018 09:12)  HR: 79 (16 Sep 2018 12:00) (64 - 90)  BP: 94/70 (16 Sep 2018 12:00) (94/70 - 133/51)  BP(mean): 75 (16 Sep 2018 12:00) (60 - 99)  RR: 18 (16 Sep 2018 12:00) (8 - 22)  SpO2: 95% (16 Sep 2018 12:00) (93% - 100%)    MEDICATIONS  (STANDING):  carvedilol 3.125 milliGRAM(s) Oral every 12 hours  docusate sodium 100 milliGRAM(s) Oral three times a day  heparin  Injectable 5000 Unit(s) SubCutaneous every 12 hours  HYDROmorphone PCA (1 mG/mL) 30 milliLiter(s) PCA Continuous PCA Continuous  losartan 25 milliGRAM(s) Oral daily  senna 2 Tablet(s) Oral at bedtime  sodium chloride 0.9%. 1000 milliLiter(s) (50 mL/Hr) IV Continuous <Continuous>    MEDICATIONS  (PRN):  acetaminophen   Tablet .. 650 milliGRAM(s) Oral every 6 hours PRN Temp greater or equal to 38C (100.4F), Mild Pain (1 - 3)  HYDROmorphone PCA (1 mG/mL) Rescue Clinician Bolus 0.5 milliGRAM(s) IV Push every 15 minutes PRN for Pain Scale GREATER THAN 6  naloxone Injectable 0.1 milliGRAM(s) IV Push every 3 minutes PRN For ANY of the following changes in patient status:  A. RR LESS THAN 10 breaths per minute, B. Oxygen saturation LESS THAN 90%, C. Sedation score of 6  ondansetron Injectable 4 milliGRAM(s) IV Push every 6 hours PRN Nausea  ondansetron Injectable 4 milliGRAM(s) IV Push every 6 hours PRN Nausea    PE:  General: NAD, Alert and stable  CV: S1S2 present, RRR  Pulm: CTABL, no audible wheeze  Abdo: Incisional tenderness. Dressing intact.    Labs:                        11.9   7.61  )-----------( 189      ( 16 Sep 2018 10:18 )             36.3   09-16    140  |  109<H>  |  9   ----------------------------<  111<H>  4.1   |  28  |  0.73    Ca    9.2      16 Sep 2018 10:18
Pt was transferred to , c/o mild abd pain, vomited twice last night, now npo.      Date of Service: 09-18-18 @ 08:15    Vital Signs Last 24 Hrs  T(C): 36.4 (18 Sep 2018 05:48), Max: 36.9 (17 Sep 2018 08:33)  T(F): 97.5 (18 Sep 2018 05:48), Max: 98.4 (17 Sep 2018 08:33)  HR: 82 (18 Sep 2018 05:48) (72 - 100)  BP: 116/58 (18 Sep 2018 05:48) (101/44 - 140/76)  BP(mean): 59 (17 Sep 2018 14:01) (57 - 73)  RR: 18 (18 Sep 2018 05:48) (17 - 21)  SpO2: 82% (18 Sep 2018 05:48) (82% - 100%)    Daily     Daily     I&O's Detail    17 Sep 2018 07:01  -  18 Sep 2018 07:00  --------------------------------------------------------  IN:  Total IN: 0 mL    OUT:    Emesis: 300 mL  Total OUT: 300 mL    Total NET: -300 mL          CAPILLARY BLOOD GLUCOSE                                          12.1   7.72  )-----------( 199      ( 18 Sep 2018 08:06 )             36.3       09-17    139  |  108  |  11  ----------------------------<  111<H>  3.8   |  29  |  0.67    Ca    9.3      17 Sep 2018 05:54  Phos  1.8     09-17  Mg     2.0     09-17                        MEDICATIONS  (STANDING):  carvedilol 3.125 milliGRAM(s) Oral every 12 hours  docusate sodium 100 milliGRAM(s) Oral three times a day  heparin  Injectable 5000 Unit(s) SubCutaneous every 12 hours  losartan 25 milliGRAM(s) Oral daily  senna 2 Tablet(s) Oral at bedtime    MEDICATIONS  (PRN):  acetaminophen   Tablet .. 650 milliGRAM(s) Oral every 6 hours PRN Temp greater or equal to 38C (100.4F), Mild Pain (1 - 3)  naloxone Injectable 0.1 milliGRAM(s) IV Push every 3 minutes PRN For ANY of the following changes in patient status:  A. RR LESS THAN 10 breaths per minute, B. Oxygen saturation LESS THAN 90%, C. Sedation score of 6  ondansetron Injectable 4 milliGRAM(s) IV Push every 6 hours PRN Nausea  ondansetron Injectable 4 milliGRAM(s) IV Push every 6 hours PRN Nausea  oxyCODONE    5 mG/acetaminophen 325 mG 1 Tablet(s) Oral every 4 hours PRN Mild Pain (1 - 3)  oxyCODONE    5 mG/acetaminophen 325 mG 2 Tablet(s) Oral every 4 hours PRN Moderate Pain (4 - 6)
Pt with uneventful night, no CP, SOB, fevers or chills.     Date of Service: 09-13-18 @ 08:36    Vital Signs Last 24 Hrs  T(C): 36.4 (13 Sep 2018 04:39), Max: 36.8 (12 Sep 2018 20:05)  T(F): 97.5 (13 Sep 2018 04:39), Max: 98.2 (12 Sep 2018 20:05)  HR: 71 (13 Sep 2018 04:39) (67 - 73)  BP: 116/54 (13 Sep 2018 04:39) (112/53 - 143/68)  BP(mean): --  RR: 17 (13 Sep 2018 04:39) (16 - 18)  SpO2: 97% (13 Sep 2018 04:39) (97% - 99%)    Daily     Daily     I&O's Detail      CAPILLARY BLOOD GLUCOSE                                          11.4   5.55  )-----------( 188      ( 12 Sep 2018 16:54 )             34.2       09-12    142  |  107  |  11  ----------------------------<  93  4.1   |  30  |  0.64    Ca    9.3      12 Sep 2018 16:54        PT/INR - ( 12 Sep 2018 16:54 )   PT: 11.7 sec;   INR: 1.08 ratio         PTT - ( 12 Sep 2018 16:54 )  PTT:30.4 sec      EKG- August-nsr with, LVH, IVCD with no acute changes             MEDICATIONS  (STANDING):  carvedilol 3.125 milliGRAM(s) Oral every 12 hours  docusate sodium 100 milliGRAM(s) Oral three times a day  heparin  Injectable 5000 Unit(s) SubCutaneous every 12 hours  losartan 25 milliGRAM(s) Oral daily  senna 2 Tablet(s) Oral at bedtime    MEDICATIONS  (PRN):  acetaminophen   Tablet .. 650 milliGRAM(s) Oral every 6 hours PRN Temp greater or equal to 38C (100.4F), Mild Pain (1 - 3)  ondansetron Injectable 4 milliGRAM(s) IV Push every 6 hours PRN Nausea
Pt with uneventful night, still with ngt, no nausea, vomiting and only minimal abd pain.  Passing flatus but no BM yet.     Date of Service: 09-20-18 @ 08:03    Vital Signs Last 24 Hrs  T(C): 36.8 (20 Sep 2018 07:46), Max: 37.4 (19 Sep 2018 19:56)  T(F): 98.3 (20 Sep 2018 07:46), Max: 99.4 (19 Sep 2018 19:56)  HR: 66 (20 Sep 2018 07:46) (66 - 86)  BP: 126/55 (20 Sep 2018 07:46) (107/45 - 137/56)  BP(mean): --  RR: 18 (20 Sep 2018 07:46) (16 - 18)  SpO2: 96% (20 Sep 2018 07:46) (94% - 96%)    Daily     Daily     I&O's Detail    19 Sep 2018 07:01  -  20 Sep 2018 07:00  --------------------------------------------------------  IN:  Total IN: 0 mL    OUT:    Nasoenteral Tube: 600 mL  Total OUT: 600 mL    Total NET: -600 mL          CAPILLARY BLOOD GLUCOSE                                          8.8    4.64  )-----------( 168      ( 20 Sep 2018 05:26 )             27.3       09-20    146<H>  |  110<H>  |  24<H>  ----------------------------<  105<H>  3.5   |  31  |  0.57    Ca    8.8      20 Sep 2018 05:26  Phos  2.1     09-20  Mg     2.1     09-20                        MEDICATIONS  (STANDING):  carvedilol 3.125 milliGRAM(s) Oral every 12 hours  dextrose 5% + sodium chloride 0.45% with potassium chloride 20 mEq/L 1000 milliLiter(s) (75 mL/Hr) IV Continuous <Continuous>  heparin  Injectable 5000 Unit(s) SubCutaneous every 12 hours  losartan 25 milliGRAM(s) Oral daily  pantoprazole  Injectable 40 milliGRAM(s) IV Push daily    MEDICATIONS  (PRN):  acetaminophen   Tablet .. 650 milliGRAM(s) Oral every 6 hours PRN Temp greater or equal to 38C (100.4F), Mild Pain (1 - 3)  naloxone Injectable 0.1 milliGRAM(s) IV Push every 3 minutes PRN For ANY of the following changes in patient status:  A. RR LESS THAN 10 breaths per minute, B. Oxygen saturation LESS THAN 90%, C. Sedation score of 6  ondansetron Injectable 4 milliGRAM(s) IV Push every 6 hours PRN Nausea  oxyCODONE    5 mG/acetaminophen 325 mG 1 Tablet(s) Oral every 4 hours PRN Mild Pain (1 - 3)  oxyCODONE    5 mG/acetaminophen 325 mG 2 Tablet(s) Oral every 4 hours PRN Moderate Pain (4 - 6)
Reported nausea and vomiting last night approximately 200cc, X Ray abdomen done prior showing ileus and colonic distension. Discussed NGT and patient agreeable, NGT placed yesterday.  This AM she feels much better and reports more flatus. Discussed the importance of ambulation. Pt understands and agrees    Exam:  ICU Vital Signs Last 24 Hrs  T(C): 36.7 (19 Sep 2018 04:37), Max: 36.8 (18 Sep 2018 13:22)  T(F): 98.1 (19 Sep 2018 04:37), Max: 98.3 (18 Sep 2018 20:38)  HR: 82 (19 Sep 2018 04:37) (82 - 100)  BP: 106/51 (19 Sep 2018 04:37) (106/51 - 137/81)  BP(mean): --  ABP: --  ABP(mean): --  RR: 18 (19 Sep 2018 04:37) (18 - 18)  SpO2: 92% (19 Sep 2018 04:37) (92% - 94%)     cc out     General: In no distress  Respiratory: non labored breathing  Abdomen: soft, distended, appropriately tender, incision with prevena dressing  Neuro: alert and oriented x 3                                      9.9    6.05  )-----------( 183      ( 19 Sep 2018 06:13 )             30.4     09-19    146<H>  |  108  |  28<H>  ----------------------------<  101<H>  3.6   |  34<H>  |  0.70    Ca    9.6      19 Sep 2018 06:13  Phos  1.9     09-19  Mg     2.0     09-19
Tolerating clears and continues to have flatus. Had BM yesterday morning. No nausea or emesis    Exam:  Vital Signs Last 24 Hrs  T(C): 36.5 (22 Sep 2018 03:53), Max: 37.1 (21 Sep 2018 18:28)  T(F): 97.7 (22 Sep 2018 03:53), Max: 98.7 (21 Sep 2018 18:28)  HR: 68 (22 Sep 2018 05:35) (64 - 84)  BP: 127/61 (22 Sep 2018 05:35) (127/61 - 146/82)  RR: 17 (22 Sep 2018 03:53) (17 - 18)  SpO2: 98% (22 Sep 2018 03:53) (97% - 99%)    General: In no distress  Respiratory: non labored breathing  Abdomen: soft, non distended, minimal tenderness, incision clean and intact with staples.   Neuro: alert and oriented x 3                           9.9    3.76  )-----------( 205      ( 22 Sep 2018 05:49 )             29.7   09-22    143  |  109<H>  |  11  ----------------------------<  97  4.1   |  29  |  0.60    Ca    9.3      22 Sep 2018 05:49  Phos  2.7     09-22  Mg     2.0     09-22
no c/o  for ct enterography today    Vital Signs Last 24 Hrs  T(C): 36.4 (13 Sep 2018 04:39), Max: 36.8 (12 Sep 2018 20:05)  T(F): 97.5 (13 Sep 2018 04:39), Max: 98.2 (12 Sep 2018 20:05)  HR: 71 (13 Sep 2018 04:39) (67 - 73)  BP: 116/54 (13 Sep 2018 04:39) (112/53 - 143/68)  BP(mean): --  RR: 17 (13 Sep 2018 04:39) (16 - 18)  SpO2: 97% (13 Sep 2018 04:39) (97% - 99%)    NAD  abd soft                          11.4   5.55  )-----------( 188      ( 12 Sep 2018 16:54 )             34.2   09-12    142  |  107  |  11  ----------------------------<  93  4.1   |  30  |  0.64    Ca    9.3      12 Sep 2018 16:54    PT/INR - ( 12 Sep 2018 16:54 )   PT: 11.7 sec;   INR: 1.08 ratio         PTT - ( 12 Sep 2018 16:54 )  PTT:30.4 sec
no n/v, ambulating  passing flatus  no bm    Vital Signs Last 24 Hrs  T(C): 36.8 (20 Sep 2018 07:46), Max: 37.4 (19 Sep 2018 19:56)  T(F): 98.3 (20 Sep 2018 07:46), Max: 99.4 (19 Sep 2018 19:56)  HR: 66 (20 Sep 2018 07:46) (66 - 86)  BP: 126/55 (20 Sep 2018 07:46) (107/45 - 137/56)  BP(mean): --  RR: 18 (20 Sep 2018 07:46) (16 - 18)  SpO2: 96% (20 Sep 2018 07:46) (94% - 96%)  NGT output  600ml in 24 hrs    abd soft. prevena in place                          8.8    4.64  )-----------( 168      ( 20 Sep 2018 05:26 )             27.3   09-20    146<H>  |  110<H>  |  24<H>  ----------------------------<  105<H>  3.5   |  31  |  0.57    Ca    8.8      20 Sep 2018 05:26  Phos  2.1     09-20  Mg     2.1     09-20
NGT placed last night for postop ileus.  Pt is c/o discomfort from her NGT, having some flatus, no BM yet.      Date of Service: 09-19-18 @ 08:57    Vital Signs Last 24 Hrs  T(C): 36.7 (19 Sep 2018 04:37), Max: 36.8 (18 Sep 2018 13:22)  T(F): 98.1 (19 Sep 2018 04:37), Max: 98.3 (18 Sep 2018 20:38)  HR: 82 (19 Sep 2018 04:37) (82 - 100)  BP: 106/51 (19 Sep 2018 04:37) (106/51 - 137/81)  BP(mean): --  RR: 18 (19 Sep 2018 04:37) (18 - 18)  SpO2: 92% (19 Sep 2018 04:37) (92% - 94%)    Daily     Daily     I&O's Detail    18 Sep 2018 07:01  -  19 Sep 2018 07:00  --------------------------------------------------------  IN:    sodium chloride 0.9%: 796 mL  Total IN: 796 mL    OUT:    Emesis: 200 mL    Nasoenteral Tube: 700 mL  Total OUT: 900 mL    Total NET: -104 mL          CAPILLARY BLOOD GLUCOSE                                          9.9    6.05  )-----------( 183      ( 19 Sep 2018 06:13 )             30.4       09-19    146<H>  |  108  |  28<H>  ----------------------------<  101<H>  3.6   |  34<H>  |  0.70    Ca    9.6      19 Sep 2018 06:13  Phos  1.9     09-19  Mg     2.0     09-19                        MEDICATIONS  (STANDING):  carvedilol 3.125 milliGRAM(s) Oral every 12 hours  dextrose 5% + sodium chloride 0.45% with potassium chloride 20 mEq/L 1000 milliLiter(s) (75 mL/Hr) IV Continuous <Continuous>  docusate sodium 100 milliGRAM(s) Oral three times a day  heparin  Injectable 5000 Unit(s) SubCutaneous every 12 hours  losartan 25 milliGRAM(s) Oral daily  magnesium hydroxide Suspension 30 milliLiter(s) Oral daily  senna 2 Tablet(s) Oral at bedtime    MEDICATIONS  (PRN):  acetaminophen   Tablet .. 650 milliGRAM(s) Oral every 6 hours PRN Temp greater or equal to 38C (100.4F), Mild Pain (1 - 3)  naloxone Injectable 0.1 milliGRAM(s) IV Push every 3 minutes PRN For ANY of the following changes in patient status:  A. RR LESS THAN 10 breaths per minute, B. Oxygen saturation LESS THAN 90%, C. Sedation score of 6  ondansetron Injectable 4 milliGRAM(s) IV Push every 6 hours PRN Nausea  oxyCODONE    5 mG/acetaminophen 325 mG 1 Tablet(s) Oral every 4 hours PRN Mild Pain (1 - 3)  oxyCODONE    5 mG/acetaminophen 325 mG 2 Tablet(s) Oral every 4 hours PRN Moderate Pain (4 - 6)

## 2018-09-23 NOTE — DISCHARGE NOTE ADULT - MEDICATION SUMMARY - MEDICATIONS TO TAKE
I will START or STAY ON the medications listed below when I get home from the hospital:    acetaminophen 325 mg oral tablet  -- 2 tab(s) by mouth every 6 hours, As needed, Mild Pain (1 - 3)  -- Indication: For Pain    Aspirin Enteric Coated 81 mg oral delayed release tablet  -- 1 tab(s) by mouth once a day  -- Indication: For home med    naproxen  -- Indication: For home med    oxyCODONE-acetaminophen 5 mg-325 mg oral tablet  -- 1 tab(s) by mouth every 6 hours, As Needed for severe pain MDD:4 tabs  -- Indication: For Pain    losartan  -- 25 milligram(s) by mouth once a day  -- Indication: For HTN    Coreg 3.125 mg oral tablet  -- 1 tab(s) by mouth 2 times a day  -- Indication: For home med    senna oral tablet  -- 2 tab(s) by mouth once a day  -- Indication: For Constipation    Colace 50 mg oral capsule  -- 1 cap(s) by mouth 2 times a day  -- Indication: For Constipation

## 2018-09-23 NOTE — PROGRESS NOTE ADULT - REASON FOR ADMISSION
abdominal pain

## 2018-09-23 NOTE — DISCHARGE NOTE ADULT - CARE PLAN
Principal Discharge DX:	Abdominal adhesions  Goal:	recover and no further bowel obstructions  Assessment and plan of treatment:	improved  Secondary Diagnosis:	Ileus following gastrointestinal surgery  Goal:	resolved

## 2018-09-23 NOTE — DISCHARGE NOTE ADULT - CARE PROVIDER_API CALL
Antonio Nunn), ColonRectal Surgery; [place]  [address] [place] [address]  Phone: [phone]  Fax: [phone]

## 2018-09-23 NOTE — DISCHARGE NOTE ADULT - PATIENT PORTAL LINK FT
You can access the [facility] Patient Portal, offered by [facility], by registering with the following website: [email or web address]

## 2018-09-23 NOTE — PROGRESS NOTE ADULT - PROVIDER SPECIALTY LIST ADULT
Colorectal Surgery
Internal Medicine

## 2018-09-27 DIAGNOSIS — Z88.8 ALLERGY STATUS TO OTHER DRUGS, MEDICAMENTS AND BIOLOGICAL SUBSTANCES STATUS: ICD-10-CM

## 2018-09-27 DIAGNOSIS — Z53.31 LAPAROSCOPIC SURGICAL PROCEDURE CONVERTED TO OPEN PROCEDURE: ICD-10-CM

## 2018-09-27 DIAGNOSIS — E44.0 MODERATE PROTEIN-CALORIE MALNUTRITION: ICD-10-CM

## 2018-09-27 DIAGNOSIS — Z90.722 ACQUIRED ABSENCE OF OVARIES, BILATERAL: ICD-10-CM

## 2018-09-27 DIAGNOSIS — D69.6 THROMBOCYTOPENIA, UNSPECIFIED: ICD-10-CM

## 2018-09-27 DIAGNOSIS — Z79.82 LONG TERM (CURRENT) USE OF ASPIRIN: ICD-10-CM

## 2018-09-27 DIAGNOSIS — I35.1 NONRHEUMATIC AORTIC (VALVE) INSUFFICIENCY: ICD-10-CM

## 2018-09-27 DIAGNOSIS — K56.7 ILEUS, UNSPECIFIED: ICD-10-CM

## 2018-09-27 DIAGNOSIS — Z88.5 ALLERGY STATUS TO NARCOTIC AGENT: ICD-10-CM

## 2018-09-27 DIAGNOSIS — Z95.810 PRESENCE OF AUTOMATIC (IMPLANTABLE) CARDIAC DEFIBRILLATOR: ICD-10-CM

## 2018-09-27 DIAGNOSIS — Z88.6 ALLERGY STATUS TO ANALGESIC AGENT: ICD-10-CM

## 2018-09-27 DIAGNOSIS — E87.0 HYPEROSMOLALITY AND HYPERNATREMIA: ICD-10-CM

## 2018-09-27 DIAGNOSIS — Z88.1 ALLERGY STATUS TO OTHER ANTIBIOTIC AGENTS STATUS: ICD-10-CM

## 2018-09-27 DIAGNOSIS — I50.22 CHRONIC SYSTOLIC (CONGESTIVE) HEART FAILURE: ICD-10-CM

## 2018-09-27 DIAGNOSIS — K56.50 INTESTINAL ADHESIONS [BANDS], UNSPECIFIED AS TO PARTIAL VERSUS COMPLETE OBSTRUCTION: ICD-10-CM

## 2018-09-27 DIAGNOSIS — Z85.3 PERSONAL HISTORY OF MALIGNANT NEOPLASM OF BREAST: ICD-10-CM

## 2018-09-27 DIAGNOSIS — Z85.038 PERSONAL HISTORY OF OTHER MALIGNANT NEOPLASM OF LARGE INTESTINE: ICD-10-CM

## 2018-09-27 DIAGNOSIS — E83.39 OTHER DISORDERS OF PHOSPHORUS METABOLISM: ICD-10-CM

## 2018-09-27 DIAGNOSIS — I42.9 CARDIOMYOPATHY, UNSPECIFIED: ICD-10-CM

## 2018-09-27 DIAGNOSIS — Z91.041 RADIOGRAPHIC DYE ALLERGY STATUS: ICD-10-CM

## 2018-09-27 DIAGNOSIS — I11.0 HYPERTENSIVE HEART DISEASE WITH HEART FAILURE: ICD-10-CM

## 2018-10-03 ENCOUNTER — APPOINTMENT (OUTPATIENT)
Dept: COLORECTAL SURGERY | Facility: CLINIC | Age: 78
End: 2018-10-03
Payer: MEDICARE

## 2018-10-03 VITALS
SYSTOLIC BLOOD PRESSURE: 98 MMHG | DIASTOLIC BLOOD PRESSURE: 66 MMHG | BODY MASS INDEX: 23.37 KG/M2 | HEIGHT: 62 IN | HEART RATE: 74 BPM | WEIGHT: 127 LBS

## 2018-10-03 DIAGNOSIS — Z98.890 OTHER SPECIFIED POSTPROCEDURAL STATES: ICD-10-CM

## 2018-10-03 PROCEDURE — 99024 POSTOP FOLLOW-UP VISIT: CPT

## 2018-10-19 ENCOUNTER — INPATIENT (INPATIENT)
Facility: HOSPITAL | Age: 78
LOS: 3 days | Discharge: HOME | DRG: 390 | End: 2018-10-23
Attending: STUDENT IN AN ORGANIZED HEALTH CARE EDUCATION/TRAINING PROGRAM | Admitting: STUDENT IN AN ORGANIZED HEALTH CARE EDUCATION/TRAINING PROGRAM
Payer: MEDICARE

## 2018-10-19 VITALS
DIASTOLIC BLOOD PRESSURE: 96 MMHG | HEART RATE: 108 BPM | HEIGHT: 63 IN | RESPIRATION RATE: 20 BRPM | OXYGEN SATURATION: 97 % | WEIGHT: 125 LBS | SYSTOLIC BLOOD PRESSURE: 146 MMHG | TEMPERATURE: 98 F

## 2018-10-19 DIAGNOSIS — Z98.890 OTHER SPECIFIED POSTPROCEDURAL STATES: Chronic | ICD-10-CM

## 2018-10-19 DIAGNOSIS — Z90.49 ACQUIRED ABSENCE OF OTHER SPECIFIED PARTS OF DIGESTIVE TRACT: Chronic | ICD-10-CM

## 2018-10-19 DIAGNOSIS — Z95.810 PRESENCE OF AUTOMATIC (IMPLANTABLE) CARDIAC DEFIBRILLATOR: Chronic | ICD-10-CM

## 2018-10-19 DIAGNOSIS — K56.609 UNSPECIFIED INTESTINAL OBSTRUCTION, UNSPECIFIED AS TO PARTIAL VERSUS COMPLETE OBSTRUCTION: ICD-10-CM

## 2018-10-19 DIAGNOSIS — Z93.2 ILEOSTOMY STATUS: Chronic | ICD-10-CM

## 2018-10-19 DIAGNOSIS — Z87.898 PERSONAL HISTORY OF OTHER SPECIFIED CONDITIONS: Chronic | ICD-10-CM

## 2018-10-19 LAB
ALBUMIN SERPL ELPH-MCNC: 4.1 G/DL — SIGNIFICANT CHANGE UP (ref 3.3–5.2)
ALP SERPL-CCNC: 82 U/L — SIGNIFICANT CHANGE UP (ref 40–120)
ALT FLD-CCNC: 18 U/L — SIGNIFICANT CHANGE UP
ANION GAP SERPL CALC-SCNC: 10 MMOL/L — SIGNIFICANT CHANGE UP (ref 5–17)
AST SERPL-CCNC: 23 U/L — SIGNIFICANT CHANGE UP
BASOPHILS # BLD AUTO: 0 K/UL — SIGNIFICANT CHANGE UP (ref 0–0.2)
BASOPHILS NFR BLD AUTO: 0.1 % — SIGNIFICANT CHANGE UP (ref 0–2)
BILIRUB SERPL-MCNC: 0.5 MG/DL — SIGNIFICANT CHANGE UP (ref 0.4–2)
BUN SERPL-MCNC: 17 MG/DL — SIGNIFICANT CHANGE UP (ref 8–20)
CALCIUM SERPL-MCNC: 10.9 MG/DL — HIGH (ref 8.6–10.2)
CHLORIDE SERPL-SCNC: 104 MMOL/L — SIGNIFICANT CHANGE UP (ref 98–107)
CO2 SERPL-SCNC: 31 MMOL/L — HIGH (ref 22–29)
CREAT SERPL-MCNC: 0.73 MG/DL — SIGNIFICANT CHANGE UP (ref 0.5–1.3)
EOSINOPHIL # BLD AUTO: 0 K/UL — SIGNIFICANT CHANGE UP (ref 0–0.5)
EOSINOPHIL NFR BLD AUTO: 0.2 % — SIGNIFICANT CHANGE UP (ref 0–6)
GLUCOSE SERPL-MCNC: 139 MG/DL — HIGH (ref 70–115)
HCT VFR BLD CALC: 40.8 % — SIGNIFICANT CHANGE UP (ref 37–47)
HGB BLD-MCNC: 12.9 G/DL — SIGNIFICANT CHANGE UP (ref 12–16)
LACTATE BLDV-MCNC: 0.9 MMOL/L — SIGNIFICANT CHANGE UP (ref 0.5–2)
LDH SERPL L TO P-CCNC: 188 U/L — SIGNIFICANT CHANGE UP (ref 98–192)
LIDOCAIN IGE QN: 26 U/L — SIGNIFICANT CHANGE UP (ref 22–51)
LYMPHOCYTES # BLD AUTO: 0.9 K/UL — LOW (ref 1–4.8)
LYMPHOCYTES # BLD AUTO: 8.1 % — LOW (ref 20–55)
MCHC RBC-ENTMCNC: 29.9 PG — SIGNIFICANT CHANGE UP (ref 27–31)
MCHC RBC-ENTMCNC: 31.6 G/DL — LOW (ref 32–36)
MCV RBC AUTO: 94.4 FL — SIGNIFICANT CHANGE UP (ref 81–99)
MONOCYTES # BLD AUTO: 0.5 K/UL — SIGNIFICANT CHANGE UP (ref 0–0.8)
MONOCYTES NFR BLD AUTO: 4 % — SIGNIFICANT CHANGE UP (ref 3–10)
NEUTROPHILS # BLD AUTO: 10.2 K/UL — HIGH (ref 1.8–8)
NEUTROPHILS NFR BLD AUTO: 87.5 % — HIGH (ref 37–73)
PLATELET # BLD AUTO: 219 K/UL — SIGNIFICANT CHANGE UP (ref 150–400)
POTASSIUM SERPL-MCNC: 4.2 MMOL/L — SIGNIFICANT CHANGE UP (ref 3.5–5.3)
POTASSIUM SERPL-SCNC: 4.2 MMOL/L — SIGNIFICANT CHANGE UP (ref 3.5–5.3)
PROT SERPL-MCNC: 7.3 G/DL — SIGNIFICANT CHANGE UP (ref 6.6–8.7)
RBC # BLD: 4.32 M/UL — LOW (ref 4.4–5.2)
RBC # FLD: 13 % — SIGNIFICANT CHANGE UP (ref 11–15.6)
SODIUM SERPL-SCNC: 145 MMOL/L — SIGNIFICANT CHANGE UP (ref 135–145)
WBC # BLD: 11.7 K/UL — HIGH (ref 4.8–10.8)
WBC # FLD AUTO: 11.7 K/UL — HIGH (ref 4.8–10.8)

## 2018-10-19 PROCEDURE — 99284 EMERGENCY DEPT VISIT MOD MDM: CPT

## 2018-10-19 PROCEDURE — 74019 RADEX ABDOMEN 2 VIEWS: CPT | Mod: 26

## 2018-10-19 PROCEDURE — 71045 X-RAY EXAM CHEST 1 VIEW: CPT | Mod: 26

## 2018-10-19 PROCEDURE — 74177 CT ABD & PELVIS W/CONTRAST: CPT | Mod: 26

## 2018-10-19 RX ORDER — ENOXAPARIN SODIUM 100 MG/ML
30 INJECTION SUBCUTANEOUS EVERY 12 HOURS
Qty: 0 | Refills: 0 | Status: DISCONTINUED | OUTPATIENT
Start: 2018-10-19 | End: 2018-10-19

## 2018-10-19 RX ORDER — SODIUM CHLORIDE 9 MG/ML
1000 INJECTION INTRAMUSCULAR; INTRAVENOUS; SUBCUTANEOUS ONCE
Qty: 0 | Refills: 0 | Status: COMPLETED | OUTPATIENT
Start: 2018-10-19 | End: 2018-10-19

## 2018-10-19 RX ORDER — ENOXAPARIN SODIUM 100 MG/ML
40 INJECTION SUBCUTANEOUS DAILY
Qty: 0 | Refills: 0 | Status: DISCONTINUED | OUTPATIENT
Start: 2018-10-19 | End: 2018-10-23

## 2018-10-19 RX ORDER — SODIUM CHLORIDE 9 MG/ML
1000 INJECTION INTRAMUSCULAR; INTRAVENOUS; SUBCUTANEOUS ONCE
Qty: 0 | Refills: 0 | Status: DISCONTINUED | OUTPATIENT
Start: 2018-10-19 | End: 2018-10-19

## 2018-10-19 RX ORDER — LOSARTAN POTASSIUM 100 MG/1
25 TABLET, FILM COATED ORAL DAILY
Qty: 0 | Refills: 0 | Status: DISCONTINUED | OUTPATIENT
Start: 2018-10-19 | End: 2018-10-23

## 2018-10-19 RX ORDER — MORPHINE SULFATE 50 MG/1
2 CAPSULE, EXTENDED RELEASE ORAL EVERY 4 HOURS
Qty: 0 | Refills: 0 | Status: DISCONTINUED | OUTPATIENT
Start: 2018-10-19 | End: 2018-10-19

## 2018-10-19 RX ORDER — CARVEDILOL PHOSPHATE 80 MG/1
3.12 CAPSULE, EXTENDED RELEASE ORAL EVERY 12 HOURS
Qty: 0 | Refills: 0 | Status: DISCONTINUED | OUTPATIENT
Start: 2018-10-19 | End: 2018-10-21

## 2018-10-19 RX ORDER — ONDANSETRON 8 MG/1
4 TABLET, FILM COATED ORAL EVERY 6 HOURS
Qty: 0 | Refills: 0 | Status: DISCONTINUED | OUTPATIENT
Start: 2018-10-19 | End: 2018-10-23

## 2018-10-19 RX ORDER — DIPHENHYDRAMINE HCL 50 MG
50 CAPSULE ORAL ONCE
Qty: 0 | Refills: 0 | Status: COMPLETED | OUTPATIENT
Start: 2018-10-19 | End: 2018-10-19

## 2018-10-19 RX ORDER — SODIUM CHLORIDE 9 MG/ML
1000 INJECTION, SOLUTION INTRAVENOUS
Qty: 0 | Refills: 0 | Status: DISCONTINUED | OUTPATIENT
Start: 2018-10-19 | End: 2018-10-23

## 2018-10-19 RX ORDER — ONDANSETRON 8 MG/1
4 TABLET, FILM COATED ORAL ONCE
Qty: 0 | Refills: 0 | Status: COMPLETED | OUTPATIENT
Start: 2018-10-19 | End: 2018-10-19

## 2018-10-19 RX ORDER — ACETAMINOPHEN 500 MG
1000 TABLET ORAL ONCE
Qty: 0 | Refills: 0 | Status: COMPLETED | OUTPATIENT
Start: 2018-10-19 | End: 2018-10-20

## 2018-10-19 RX ADMIN — Medication 50 MILLIGRAM(S): at 09:50

## 2018-10-19 RX ADMIN — Medication 125 MILLIGRAM(S): at 05:45

## 2018-10-19 RX ADMIN — ONDANSETRON 4 MILLIGRAM(S): 8 TABLET, FILM COATED ORAL at 05:45

## 2018-10-19 RX ADMIN — ENOXAPARIN SODIUM 40 MILLIGRAM(S): 100 INJECTION SUBCUTANEOUS at 17:52

## 2018-10-19 RX ADMIN — SODIUM CHLORIDE 1000 MILLILITER(S): 9 INJECTION INTRAMUSCULAR; INTRAVENOUS; SUBCUTANEOUS at 05:45

## 2018-10-19 RX ADMIN — CARVEDILOL PHOSPHATE 3.12 MILLIGRAM(S): 80 CAPSULE, EXTENDED RELEASE ORAL at 17:52

## 2018-10-19 RX ADMIN — MORPHINE SULFATE 2 MILLIGRAM(S): 50 CAPSULE, EXTENDED RELEASE ORAL at 15:39

## 2018-10-19 RX ADMIN — SODIUM CHLORIDE 1000 MILLILITER(S): 9 INJECTION INTRAMUSCULAR; INTRAVENOUS; SUBCUTANEOUS at 06:45

## 2018-10-19 RX ADMIN — LOSARTAN POTASSIUM 25 MILLIGRAM(S): 100 TABLET, FILM COATED ORAL at 17:52

## 2018-10-19 RX ADMIN — SODIUM CHLORIDE 100 MILLILITER(S): 9 INJECTION, SOLUTION INTRAVENOUS at 15:33

## 2018-10-19 NOTE — ED PROVIDER NOTE - ATTENDING CONTRIBUTION TO CARE
79 yo female with multiple abdominal surgeries presents with acute abdominal pain since last night. I personally saw the patient with the PA, and completed the key components of the history and physical exam. I then discussed the management plan with the PA.

## 2018-10-19 NOTE — H&P ADULT - PROBLEM SELECTOR PLAN 1
-Admit to Colorectal Surgery  -NG tube placed by IV  -IV Fluids  -Strict I/O's  -Serial Abdominal Exam  -Restart Home Meds  -AM TJ    D/W with attending, Dr. Marcum

## 2018-10-19 NOTE — ED ADULT NURSE NOTE - NSIMPLEMENTINTERV_GEN_ALL_ED
Implemented All Universal Safety Interventions:  [place] to call system. Call bell, personal items and telephone within reach. Instruct patient to call for assistance. Room bathroom lighting operational. Non-slip footwear when patient is off stretcher. Physically safe environment: no spills, clutter or unnecessary equipment. Stretcher in lowest position, wheels locked, appropriate side rails in place.

## 2018-10-19 NOTE — ED ADULT NURSE NOTE - OBJECTIVE STATEMENT
Received patient lying in bed, a&ox3, able to make needs known. Noted with cane at bedside, right wrist pink id band. Received patient lying in bed, a&ox3, able to make needs known. Noted with cane at bedside, right wrist pink id band re: right lumpectomy, breast ca. Patient complains of abdominal pain, 5/10, active vomiting and nausea onset last night. Patient noted with abdominal scars, was recently admitted for SBO with adhesions. PMH: AICD, HTN, perforated diverticulitis with sigmoid resection & ileostomy 7/20/17, Loop Ileostomy reversal 2/22/18. Patient denies dizziness, headache, fever and chills. Abdomen palpable with tenderness, respiration even and unlabored. Patient not in acute respiratory distress.

## 2018-10-19 NOTE — H&P ADULT - HISTORY OF PRESENT ILLNESS
77yo female with PMH significant for diverticulitis s/p sigmoidectomy, ileostomy, s/p reversal of ileostomy, and ex lap requiring KATHERINE presents with one day hx of epigastric pain with multiple episodes of bilous emesis. Last bowel movement was 2 days ago. Last meal was one day ago. She denies chest pain, shortness of breath, dysuria, and hematuria. Pt was admitted to [facility] one month ago for small bowel obstruction which required ex-lap for KATHERINE. She was last seen at Dr. Nunn's office 2 weeks ago.     PMH: Diverticulitis, Breast Ca, Cardiomyopathy, HTN, valvular heart dz,  Surgical Hx: Sigmoidectomy, Loop ileostomy, loop ileostomy reversal, Ex lap for KATHERINE  Allergies: Cipro, Keflex, IV contrast, codeine  Social Hx: Denies EtOH and Tobacco

## 2018-10-19 NOTE — H&P ADULT - NSHPPHYSICALEXAM_GEN_ALL_CORE
General: No acute distress  CV; S1/S2  Resp: CTABL  Abd: Soft, nontender. No guarding or rebound.   Vasc: Radial Pulse 2+, bilaterally

## 2018-10-19 NOTE — ED ADULT NURSE NOTE - CHIEF COMPLAINT QUOTE
patient with abdominal pain nausea and vomiting, states had surgery 5 weeks ago for removal of adhesions had ileostomy placed in 2017 and reversal in february 2018 today N/V multiple times

## 2018-10-19 NOTE — ED PROVIDER NOTE - PROGRESS NOTE DETAILS
ATTENDING MD Amberly: Surgery notified about bowl obstruction. Pt not currently in pain or nauseated. Probable admission to surgery.

## 2018-10-19 NOTE — ED PROVIDER NOTE - GASTROINTESTINAL, MLM
soft nondistended. surgical midline incision intact without dehiscence erythema or warmth. Tensing of abdomen with palpation. + TTP lateral to midline incision bilaterally. no rebound or guarding

## 2018-10-19 NOTE — ED ADULT NURSE REASSESSMENT NOTE - NS ED NURSE REASSESS COMMENT FT1
Assumed patient care, A/Ox3, VSS, patient denies allergy to contrast, but will be given benadryl at 0845 for scan, patient up ambulating to bathroom with cane.

## 2018-10-19 NOTE — ED ADULT TRIAGE NOTE - CHIEF COMPLAINT QUOTE
patient with abdominal pain nausea and vomiting, states had surgery 5 weeks ago for ileostomy, with removal od adhesions patient with abdominal pain nausea and vomiting, states had surgery 5 weeks ago for removal of adhesions had ileostomy placed in 2017 and reversal in february 2018 today N/V multiple times

## 2018-10-19 NOTE — H&P ADULT - ASSESSMENT
79yo presents with small bowel obstruction  -hemodynamically stable  -lactate 0.9  -CT: Transition point seen in pelvis

## 2018-10-19 NOTE — ED PROVIDER NOTE - OBJECTIVE STATEMENT
77 y/o F hx of HTN, loop ileostomy reversal 2/22/18,  perforated diverticulitis w/ sigmoid resection and ileostomy (7/2017), recently admitted and treated for SBO with lyses of adhesions.  discharged home and was feeling better up until last evening when she states that she started to have generalized abdominal pain, nausea and multiple episodes of vomiting. LBM was on Wednesday, states that she is passing minimal gas since last afternoon. decreased appetite. denies fever chills, cp or sob. last meal was yesterday afternoon. denies dysuria or hematuria.

## 2018-10-19 NOTE — ED PROVIDER NOTE - MEDICAL DECISION MAKING DETAILS
extensive surgical hx with SBO   labs, Ct abdomen and pelvis to eval for sbo vs possible ileus.   surgical consult and re-evaluate

## 2018-10-19 NOTE — ED ADULT NURSE NOTE - NSSISCREENINGQ4_ED_A_ED
Indication: Syncope. Confusion. History of cardiac disease.



Comparison: June 19, 2017



Technique: Sitting AP 2012 hours



Report: RIGHT side ventriculoperitoneal shunt catheter noted. Median sternotomy wires and

mediastinal vascular clips. Cardiomegaly. Unremarkable central pulmonary vasculature.

Clear lungs and pleural spaces. Negative for pneumothorax.



IMPRESSION: Cardiomegaly without evidence for pulmonary edema. No

## 2018-10-20 LAB
ANION GAP SERPL CALC-SCNC: 5 MMOL/L — SIGNIFICANT CHANGE UP (ref 5–17)
BUN SERPL-MCNC: 19 MG/DL — SIGNIFICANT CHANGE UP (ref 8–20)
CALCIUM SERPL-MCNC: 9.5 MG/DL — SIGNIFICANT CHANGE UP (ref 8.6–10.2)
CHLORIDE SERPL-SCNC: 107 MMOL/L — SIGNIFICANT CHANGE UP (ref 98–107)
CO2 SERPL-SCNC: 30 MMOL/L — HIGH (ref 22–29)
CREAT SERPL-MCNC: 0.62 MG/DL — SIGNIFICANT CHANGE UP (ref 0.5–1.3)
GLUCOSE SERPL-MCNC: 93 MG/DL — SIGNIFICANT CHANGE UP (ref 70–115)
MAGNESIUM SERPL-MCNC: 2 MG/DL — SIGNIFICANT CHANGE UP (ref 1.6–2.6)
PHOSPHATE SERPL-MCNC: 2.5 MG/DL — SIGNIFICANT CHANGE UP (ref 2.4–4.7)
POTASSIUM SERPL-MCNC: 3.9 MMOL/L — SIGNIFICANT CHANGE UP (ref 3.5–5.3)
POTASSIUM SERPL-SCNC: 3.9 MMOL/L — SIGNIFICANT CHANGE UP (ref 3.5–5.3)
SODIUM SERPL-SCNC: 142 MMOL/L — SIGNIFICANT CHANGE UP (ref 135–145)

## 2018-10-20 PROCEDURE — 74018 RADEX ABDOMEN 1 VIEW: CPT | Mod: 26,59

## 2018-10-20 PROCEDURE — 74019 RADEX ABDOMEN 2 VIEWS: CPT | Mod: 26

## 2018-10-20 RX ORDER — POTASSIUM PHOSPHATE, MONOBASIC POTASSIUM PHOSPHATE, DIBASIC 236; 224 MG/ML; MG/ML
15 INJECTION, SOLUTION INTRAVENOUS ONCE
Qty: 0 | Refills: 0 | Status: COMPLETED | OUTPATIENT
Start: 2018-10-20 | End: 2018-10-20

## 2018-10-20 RX ADMIN — POTASSIUM PHOSPHATE, MONOBASIC POTASSIUM PHOSPHATE, DIBASIC 62.5 MILLIMOLE(S): 236; 224 INJECTION, SOLUTION INTRAVENOUS at 16:12

## 2018-10-20 RX ADMIN — CARVEDILOL PHOSPHATE 3.12 MILLIGRAM(S): 80 CAPSULE, EXTENDED RELEASE ORAL at 17:58

## 2018-10-20 RX ADMIN — LOSARTAN POTASSIUM 25 MILLIGRAM(S): 100 TABLET, FILM COATED ORAL at 05:45

## 2018-10-20 RX ADMIN — Medication 400 MILLIGRAM(S): at 20:46

## 2018-10-20 RX ADMIN — ENOXAPARIN SODIUM 40 MILLIGRAM(S): 100 INJECTION SUBCUTANEOUS at 12:32

## 2018-10-20 RX ADMIN — CARVEDILOL PHOSPHATE 3.12 MILLIGRAM(S): 80 CAPSULE, EXTENDED RELEASE ORAL at 05:45

## 2018-10-20 RX ADMIN — Medication 1000 MILLIGRAM(S): at 21:00

## 2018-10-20 NOTE — PROGRESS NOTE ADULT - SUBJECTIVE AND OBJECTIVE BOX
Pt doing well today. +flatus, -bms.     Vital Signs Last 24 Hrs  T(C): 36.9 (20 Oct 2018 08:01), Max: 36.9 (20 Oct 2018 08:01)  T(F): 98.5 (20 Oct 2018 08:01), Max: 98.5 (20 Oct 2018 08:01)  HR: 60 (20 Oct 2018 08:01) (60 - 79)  BP: 118/59 (20 Oct 2018 08:01) (100/55 - 118/59)  RR: 18 (20 Oct 2018 08:01) (18 - 20)  SpO2: 99% (20 Oct 2018 08:01) (95% - 99%)  I&O's Detail    19 Oct 2018 07:01  -  20 Oct 2018 07:00  --------------------------------------------------------  IN:    lactated ringers.: 1300 mL  Total IN: 1300 mL    OUT:    Nasoenteral Tube: 100 mL  Total OUT: 100 mL    OUT:    Voided: 450 mL  Total OUT: 450 mL    abd soft, non tender, mildly distended                          12.9   11.7  )-----------( 219      ( 19 Oct 2018 05:38 )             40.8   10-20    142  |  107  |  19.0  ----------------------------<  93  3.9   |  30.0<H>  |  0.62    Ca    9.5      20 Oct 2018 07:38  Phos  2.5     10-20  Mg     2.0     10-20    TPro  7.3  /  Alb  4.1  /  TBili  0.5  /  DBili  x   /  AST  23  /  ALT  18  /  AlkPhos  82  10-19

## 2018-10-20 NOTE — PROGRESS NOTE ADULT - SUBJECTIVE AND OBJECTIVE BOX
INTERVAL HPI/OVERNIGHT EVENTS: Patient transferred to . NGT was inserted.    SUBJECTIVE: Abd pain better this AM. Still no flatus or BM (last BM was wednesday). No fevers/chills, no N/V.       MEDICATIONS  (STANDING):  carvedilol 3.125 milliGRAM(s) Oral every 12 hours  enoxaparin Injectable 40 milliGRAM(s) SubCutaneous daily  lactated ringers. 1000 milliLiter(s) (100 mL/Hr) IV Continuous <Continuous>  losartan 25 milliGRAM(s) Oral daily    MEDICATIONS  (PRN):  acetaminophen  IVPB .. 1000 milliGRAM(s) IV Intermittent once PRN Mild Pain (1 - 3), Moderate Pain (4 - 6), Severe Pain (7 - 10)  ondansetron Injectable 4 milliGRAM(s) IV Push every 6 hours PRN Nausea and/or Vomiting      Vital Signs Last 24 Hrs  T(C): 36.6 (20 Oct 2018 00:16), Max: 36.9 (19 Oct 2018 07:33)  T(F): 97.8 (20 Oct 2018 00:16), Max: 98.5 (19 Oct 2018 07:33)  HR: 62 (20 Oct 2018 00:16) (62 - 108)  BP: 110/60 (20 Oct 2018 00:16) (100/55 - 146/96)  BP(mean): --  RR: 19 (20 Oct 2018 00:16) (18 - 20)  SpO2: 95% (20 Oct 2018 00:16) (95% - 99%)    PE  Gen: NAD  Pulm: No increased WOB  Abd: Soft, NT, ND. Midline scar well-healed.       I&O's Detail      LABS:                        12.9   11.7  )-----------( 219      ( 19 Oct 2018 05:38 )             40.8     10-19    145  |  104  |  17.0  ----------------------------<  139<H>  4.2   |  31.0<H>  |  0.73    Ca    10.9<H>      19 Oct 2018 05:38    TPro  7.3  /  Alb  4.1  /  TBili  0.5  /  DBili  x   /  AST  23  /  ALT  18  /  AlkPhos  82  10-19          RADIOLOGY & ADDITIONAL STUDIES: INTERVAL HPI/OVERNIGHT EVENTS: Patient transferred to . NGT was inserted.    SUBJECTIVE: Abd pain better this AM. Still no flatus or BM (last BM was wednesday). No fevers/chills, no N/V.       MEDICATIONS  (STANDING):  carvedilol 3.125 milliGRAM(s) Oral every 12 hours  enoxaparin Injectable 40 milliGRAM(s) SubCutaneous daily  lactated ringers. 1000 milliLiter(s) (100 mL/Hr) IV Continuous <Continuous>  losartan 25 milliGRAM(s) Oral daily    MEDICATIONS  (PRN):  acetaminophen  IVPB .. 1000 milliGRAM(s) IV Intermittent once PRN Mild Pain (1 - 3), Moderate Pain (4 - 6), Severe Pain (7 - 10)  ondansetron Injectable 4 milliGRAM(s) IV Push every 6 hours PRN Nausea and/or Vomiting      Vital Signs Last 24 Hrs  T(C): 36.6 (20 Oct 2018 00:16), Max: 36.9 (19 Oct 2018 07:33)  T(F): 97.8 (20 Oct 2018 00:16), Max: 98.5 (19 Oct 2018 07:33)  HR: 62 (20 Oct 2018 00:16) (62 - 108)  BP: 110/60 (20 Oct 2018 00:16) (100/55 - 146/96)  BP(mean): --  RR: 19 (20 Oct 2018 00:16) (18 - 20)  SpO2: 95% (20 Oct 2018 00:16) (95% - 99%)    PE  Gen: NAD  HEENT: NGT in place, 100cc output recorded  Pulm: No increased WOB  Abd: Soft, NT, ND. Midline scar well-healed.       I&O's Detail      LABS:                        12.9   11.7  )-----------( 219      ( 19 Oct 2018 05:38 )             40.8     10-19    145  |  104  |  17.0  ----------------------------<  139<H>  4.2   |  31.0<H>  |  0.73    Ca    10.9<H>      19 Oct 2018 05:38    TPro  7.3  /  Alb  4.1  /  TBili  0.5  /  DBili  x   /  AST  23  /  ALT  18  /  AlkPhos  82  10-19          RADIOLOGY & ADDITIONAL STUDIES:

## 2018-10-20 NOTE — PROGRESS NOTE ADULT - ASSESSMENT
78F with hx multiple abd surgeries presents with SBO. NGT was placed with minimal output    -f/u AM KUB  -serial abdominal exams  -continue NGT  -continue IVFs  -pain control  -ISS  -OOB  -DVT ppx 78F with hx multiple abd surgeries presents with SBO. NGT was placed overnight, with 100cc output recorded    -f/u AM KUB  -serial abdominal exams  -continue NGT  -continue IVFs  -pain control  -ISS  -OOB  -DVT ppx

## 2018-10-20 NOTE — PROGRESS NOTE ADULT - ASSESSMENT
78F s/p ex lap, KATHERINE on 9/15/18 p/w SBO     -AXR  -Passing flatus this AM, however still mildly distended  -NPO/NGT for now  -IVF  -OOB

## 2018-10-21 LAB
ANION GAP SERPL CALC-SCNC: 11 MMOL/L — SIGNIFICANT CHANGE UP (ref 5–17)
BASOPHILS # BLD AUTO: 0 K/UL — SIGNIFICANT CHANGE UP (ref 0–0.2)
BASOPHILS NFR BLD AUTO: 0.3 % — SIGNIFICANT CHANGE UP (ref 0–2)
BUN SERPL-MCNC: 17 MG/DL — SIGNIFICANT CHANGE UP (ref 8–20)
CALCIUM SERPL-MCNC: 9.5 MG/DL — SIGNIFICANT CHANGE UP (ref 8.6–10.2)
CHLORIDE SERPL-SCNC: 103 MMOL/L — SIGNIFICANT CHANGE UP (ref 98–107)
CO2 SERPL-SCNC: 27 MMOL/L — SIGNIFICANT CHANGE UP (ref 22–29)
CREAT SERPL-MCNC: 0.55 MG/DL — SIGNIFICANT CHANGE UP (ref 0.5–1.3)
EOSINOPHIL # BLD AUTO: 0.1 K/UL — SIGNIFICANT CHANGE UP (ref 0–0.5)
EOSINOPHIL NFR BLD AUTO: 1.9 % — SIGNIFICANT CHANGE UP (ref 0–6)
GLUCOSE SERPL-MCNC: 70 MG/DL — SIGNIFICANT CHANGE UP (ref 70–115)
HCT VFR BLD CALC: 34.6 % — LOW (ref 37–47)
HGB BLD-MCNC: 11.2 G/DL — LOW (ref 12–16)
LYMPHOCYTES # BLD AUTO: 1.7 K/UL — SIGNIFICANT CHANGE UP (ref 1–4.8)
LYMPHOCYTES # BLD AUTO: 29.3 % — SIGNIFICANT CHANGE UP (ref 20–55)
MAGNESIUM SERPL-MCNC: 2 MG/DL — SIGNIFICANT CHANGE UP (ref 1.6–2.6)
MCHC RBC-ENTMCNC: 30.2 PG — SIGNIFICANT CHANGE UP (ref 27–31)
MCHC RBC-ENTMCNC: 32.4 G/DL — SIGNIFICANT CHANGE UP (ref 32–36)
MCV RBC AUTO: 93.3 FL — SIGNIFICANT CHANGE UP (ref 81–99)
MONOCYTES # BLD AUTO: 0.3 K/UL — SIGNIFICANT CHANGE UP (ref 0–0.8)
MONOCYTES NFR BLD AUTO: 5 % — SIGNIFICANT CHANGE UP (ref 3–10)
NEUTROPHILS # BLD AUTO: 3.6 K/UL — SIGNIFICANT CHANGE UP (ref 1.8–8)
NEUTROPHILS NFR BLD AUTO: 63.3 % — SIGNIFICANT CHANGE UP (ref 37–73)
PHOSPHATE SERPL-MCNC: 2.5 MG/DL — SIGNIFICANT CHANGE UP (ref 2.4–4.7)
PLATELET # BLD AUTO: 189 K/UL — SIGNIFICANT CHANGE UP (ref 150–400)
POTASSIUM SERPL-MCNC: 3.7 MMOL/L — SIGNIFICANT CHANGE UP (ref 3.5–5.3)
POTASSIUM SERPL-SCNC: 3.7 MMOL/L — SIGNIFICANT CHANGE UP (ref 3.5–5.3)
RBC # BLD: 3.71 M/UL — LOW (ref 4.4–5.2)
RBC # FLD: 12.7 % — SIGNIFICANT CHANGE UP (ref 11–15.6)
SODIUM SERPL-SCNC: 141 MMOL/L — SIGNIFICANT CHANGE UP (ref 135–145)
WBC # BLD: 5.8 K/UL — SIGNIFICANT CHANGE UP (ref 4.8–10.8)
WBC # FLD AUTO: 5.8 K/UL — SIGNIFICANT CHANGE UP (ref 4.8–10.8)

## 2018-10-21 PROCEDURE — 74019 RADEX ABDOMEN 2 VIEWS: CPT | Mod: 26

## 2018-10-21 RX ORDER — ACETAMINOPHEN 500 MG
1000 TABLET ORAL ONCE
Qty: 0 | Refills: 0 | Status: COMPLETED | OUTPATIENT
Start: 2018-10-21 | End: 2018-10-21

## 2018-10-21 RX ORDER — CARVEDILOL PHOSPHATE 80 MG/1
3.12 CAPSULE, EXTENDED RELEASE ORAL EVERY 12 HOURS
Qty: 0 | Refills: 0 | Status: DISCONTINUED | OUTPATIENT
Start: 2018-10-21 | End: 2018-10-23

## 2018-10-21 RX ORDER — ACETAMINOPHEN 500 MG
650 TABLET ORAL EVERY 6 HOURS
Qty: 0 | Refills: 0 | Status: DISCONTINUED | OUTPATIENT
Start: 2018-10-21 | End: 2018-10-23

## 2018-10-21 RX ORDER — POTASSIUM CHLORIDE 20 MEQ
20 PACKET (EA) ORAL ONCE
Qty: 0 | Refills: 0 | Status: COMPLETED | OUTPATIENT
Start: 2018-10-21 | End: 2018-10-21

## 2018-10-21 RX ADMIN — SODIUM CHLORIDE 100 MILLILITER(S): 9 INJECTION, SOLUTION INTRAVENOUS at 18:05

## 2018-10-21 RX ADMIN — ENOXAPARIN SODIUM 40 MILLIGRAM(S): 100 INJECTION SUBCUTANEOUS at 13:32

## 2018-10-21 RX ADMIN — Medication 1000 MILLIGRAM(S): at 21:54

## 2018-10-21 RX ADMIN — SODIUM CHLORIDE 100 MILLILITER(S): 9 INJECTION, SOLUTION INTRAVENOUS at 21:39

## 2018-10-21 RX ADMIN — SODIUM CHLORIDE 100 MILLILITER(S): 9 INJECTION, SOLUTION INTRAVENOUS at 09:10

## 2018-10-21 RX ADMIN — Medication 20 MILLIEQUIVALENT(S): at 18:04

## 2018-10-21 RX ADMIN — CARVEDILOL PHOSPHATE 3.12 MILLIGRAM(S): 80 CAPSULE, EXTENDED RELEASE ORAL at 18:04

## 2018-10-21 RX ADMIN — LOSARTAN POTASSIUM 25 MILLIGRAM(S): 100 TABLET, FILM COATED ORAL at 05:55

## 2018-10-21 RX ADMIN — CARVEDILOL PHOSPHATE 3.12 MILLIGRAM(S): 80 CAPSULE, EXTENDED RELEASE ORAL at 05:55

## 2018-10-21 RX ADMIN — Medication 400 MILLIGRAM(S): at 21:39

## 2018-10-21 NOTE — PROGRESS NOTE ADULT - SUBJECTIVE AND OBJECTIVE BOX
HPI/OVERNIGHT EVENTS: No acute events overnight. patient was evaluated at bedside and was found afebrile, HDS and in no acute distress. Patient is passing flatus but no bowel movements. Denies nausea, vomiting, fever, chest pain or SOB.    MEDICATIONS  (STANDING):  carvedilol 3.125 milliGRAM(s) Oral every 12 hours  enoxaparin Injectable 40 milliGRAM(s) SubCutaneous daily  lactated ringers. 1000 milliLiter(s) (100 mL/Hr) IV Continuous <Continuous>  losartan 25 milliGRAM(s) Oral daily    MEDICATIONS  (PRN):  ondansetron Injectable 4 milliGRAM(s) IV Push every 6 hours PRN Nausea and/or Vomiting      Vital Signs Last 24 Hrs  T(C): 36.1 (20 Oct 2018 23:39), Max: 36.9 (20 Oct 2018 08:01)  T(F): 97 (20 Oct 2018 23:39), Max: 98.5 (20 Oct 2018 08:01)  HR: 66 (20 Oct 2018 23:39) (60 - 73)  BP: 132/63 (20 Oct 2018 23:39) (110/57 - 134/69)  BP(mean): --  RR: 18 (20 Oct 2018 23:39) (18 - 18)  SpO2: 99% (20 Oct 2018 23:39) (96% - 99%)    PE  Gen: NAD  HEENT: NGT in place, 50 cc output recorded  Pulm: No increased WOB  Abd: Soft, NT, ND. Midline scar well-healed.       I&O's Detail    19 Oct 2018 07:01  -  20 Oct 2018 07:00  --------------------------------------------------------  IN:    lactated ringers.: 1300 mL  Total IN: 1300 mL    OUT:    Nasoenteral Tube: 150 mL  Total OUT: 150 mL    Total NET: 1150 mL      20 Oct 2018 07:01  -  21 Oct 2018 03:20  --------------------------------------------------------  IN:    lactated ringers.: 700 mL  Total IN: 700 mL    OUT:    Nasoenteral Tube: 50 mL    Voided: 750 mL  Total OUT: 800 mL    Total NET: -100 mL          LABS:                        12.9   11.7  )-----------( 219      ( 19 Oct 2018 05:38 )             40.8     10-20    142  |  107  |  19.0  ----------------------------<  93  3.9   |  30.0<H>  |  0.62    Ca    9.5      20 Oct 2018 07:38  Phos  2.5     10-20  Mg     2.0     10-20    TPro  7.3  /  Alb  4.1  /  TBili  0.5  /  DBili  x   /  AST  23  /  ALT  18  /  AlkPhos  82  10-19

## 2018-10-21 NOTE — PROGRESS NOTE ADULT - ASSESSMENT
78F with hx multiple abd surgeries presents with SBO. KUB showed focal small bowel ileus in the LLQ of the abd. NGT output 50 cc. Passing flatus, no bowel movements.    Plan:  - NPO, IVF  - f/u am labs  - Serial abdominal exams  - Continue NGT  - Continue IVFs  - Pain control  - IS, OOB  - DVT ppx 78F with hx multiple abd surgeries presents with SBO. KUB showed focal small bowel ileus in the LLQ of the abd. NGT output 50 cc. Passing flatus, no bowel movements.    Plan:  - AXR today  - Serial abdominal exams  - Continue NGT  - Continue IVFs  - Pain control  - IS, OOB  - DVT ppx

## 2018-10-22 ENCOUNTER — TRANSCRIPTION ENCOUNTER (OUTPATIENT)
Age: 78
End: 2018-10-22

## 2018-10-22 LAB
ANION GAP SERPL CALC-SCNC: 9 MMOL/L — SIGNIFICANT CHANGE UP (ref 5–17)
BUN SERPL-MCNC: 10 MG/DL — SIGNIFICANT CHANGE UP (ref 8–20)
CALCIUM SERPL-MCNC: 9.4 MG/DL — SIGNIFICANT CHANGE UP (ref 8.6–10.2)
CHLORIDE SERPL-SCNC: 108 MMOL/L — HIGH (ref 98–107)
CO2 SERPL-SCNC: 27 MMOL/L — SIGNIFICANT CHANGE UP (ref 22–29)
CREAT SERPL-MCNC: 0.52 MG/DL — SIGNIFICANT CHANGE UP (ref 0.5–1.3)
GLUCOSE SERPL-MCNC: 101 MG/DL — SIGNIFICANT CHANGE UP (ref 70–115)
HCT VFR BLD CALC: 31 % — LOW (ref 37–47)
HGB BLD-MCNC: 10.2 G/DL — LOW (ref 12–16)
MAGNESIUM SERPL-MCNC: 1.9 MG/DL — SIGNIFICANT CHANGE UP (ref 1.6–2.6)
MCHC RBC-ENTMCNC: 29.9 PG — SIGNIFICANT CHANGE UP (ref 27–31)
MCHC RBC-ENTMCNC: 32.9 G/DL — SIGNIFICANT CHANGE UP (ref 32–36)
MCV RBC AUTO: 90.9 FL — SIGNIFICANT CHANGE UP (ref 81–99)
PHOSPHATE SERPL-MCNC: 1.9 MG/DL — LOW (ref 2.4–4.7)
PLATELET # BLD AUTO: 189 K/UL — SIGNIFICANT CHANGE UP (ref 150–400)
POTASSIUM SERPL-MCNC: 3.9 MMOL/L — SIGNIFICANT CHANGE UP (ref 3.5–5.3)
POTASSIUM SERPL-SCNC: 3.9 MMOL/L — SIGNIFICANT CHANGE UP (ref 3.5–5.3)
RBC # BLD: 3.41 M/UL — LOW (ref 4.4–5.2)
RBC # FLD: 12.2 % — SIGNIFICANT CHANGE UP (ref 11–15.6)
SODIUM SERPL-SCNC: 144 MMOL/L — SIGNIFICANT CHANGE UP (ref 135–145)
WBC # BLD: 3.6 K/UL — LOW (ref 4.8–10.8)
WBC # FLD AUTO: 3.6 K/UL — LOW (ref 4.8–10.8)

## 2018-10-22 RX ORDER — SODIUM,POTASSIUM PHOSPHATES 278-250MG
1 POWDER IN PACKET (EA) ORAL ONCE
Qty: 0 | Refills: 0 | Status: COMPLETED | OUTPATIENT
Start: 2018-10-22 | End: 2018-10-22

## 2018-10-22 RX ADMIN — CARVEDILOL PHOSPHATE 3.12 MILLIGRAM(S): 80 CAPSULE, EXTENDED RELEASE ORAL at 17:48

## 2018-10-22 RX ADMIN — Medication 1 PACKET(S): at 14:32

## 2018-10-22 RX ADMIN — CARVEDILOL PHOSPHATE 3.12 MILLIGRAM(S): 80 CAPSULE, EXTENDED RELEASE ORAL at 05:28

## 2018-10-22 RX ADMIN — LOSARTAN POTASSIUM 25 MILLIGRAM(S): 100 TABLET, FILM COATED ORAL at 05:28

## 2018-10-22 RX ADMIN — ENOXAPARIN SODIUM 40 MILLIGRAM(S): 100 INJECTION SUBCUTANEOUS at 12:20

## 2018-10-22 NOTE — PROGRESS NOTE ADULT - ASSESSMENT
78F with hx multiple abd surgeries presents with SBO. NGT was removed on 10/21. Pt tolerated CLD and had bowel movements. Will progress to Reg Diet.    Plan:  - Adv to Reg Diet  - Monitor BM  - Pain Control  - Serial abdominal exams  - IS, OOB  - DVT ppx

## 2018-10-22 NOTE — DISCHARGE NOTE ADULT - MEDICATION SUMMARY - MEDICATIONS TO TAKE
I will START or STAY ON the medications listed below when I get home from the hospital:    acetaminophen 325 mg oral tablet  -- 2 tab(s) by mouth every 6 hours, As needed, Mild Pain (1 - 3)  -- Indication: For for pain    Aspirin Enteric Coated 81 mg oral delayed release tablet  -- 1 tab(s) by mouth once a day  -- Indication: For home med    naproxen  -- Indication: For home med    oxyCODONE-acetaminophen 5 mg-325 mg oral tablet  -- 1 tab(s) by mouth every 6 hours, As Needed for severe pain MDD:4 tabs  -- Indication: For home med    losartan  -- 25 milligram(s) by mouth once a day  -- Indication: For home med    Coreg 3.125 mg oral tablet  -- 1 tab(s) by mouth 2 times a day  -- Indication: For home med    senna oral tablet  -- 2 tab(s) by mouth once a day  -- Indication: For home med    Colace 50 mg oral capsule  -- 1 cap(s) by mouth 2 times a day  -- Indication: For home med

## 2018-10-22 NOTE — DISCHARGE NOTE ADULT - PATIENT PORTAL LINK FT
You can access the [facility] Patient Portal, offered by [facility], by registering with the following website: [email or web address]

## 2018-10-22 NOTE — DISCHARGE NOTE ADULT - PLAN OF CARE
Resumption of normal bowel function Small bowel obstruction most likely 2/2 adhesive disease Small bowel obstruction most likely 2/2 adhesive disease, continue bowel regimen

## 2018-10-22 NOTE — DISCHARGE NOTE ADULT - CARE PROVIDER_API CALL
Antonio Nunn), ColonRectal Surgery; Surgery  [address] [place] [address]  Phone: [phone]  Fax: [phone]

## 2018-10-22 NOTE — DISCHARGE NOTE ADULT - CARE PLAN
Principal Discharge DX:	Small bowel obstruction  Goal:	Resumption of normal bowel function  Assessment and plan of treatment:	Small bowel obstruction most likely 2/2 adhesive disease Principal Discharge DX:	Small bowel obstruction  Goal:	Resumption of normal bowel function  Assessment and plan of treatment:	Small bowel obstruction most likely 2/2 adhesive disease, continue bowel regimen

## 2018-10-22 NOTE — DISCHARGE NOTE ADULT - HOSPITAL COURSE
Ms. Jacinto with multiple abdominal surgeries and hx of SBOs was admitted on 10/19 with obstructive symptoms, found on CT with partial or intermittent SBO. Patient was managed non-operatively for her SBO with bowel rest and decompression. Patient's symptoms resolved, had resumption of bowel function. Before discharge, patient tolerated a regular diet and was having BMs.

## 2018-10-22 NOTE — PROGRESS NOTE ADULT - SUBJECTIVE AND OBJECTIVE BOX
HPI/OVERNIGHT EVENTS: No acute events overnight. Patient was evaluated at bedside and was found afebrile, HDS and in NAD. Patient is tolerating CLD and had a bowel movement. Denies nausea, vomiting, fever, chest pain or SOB.    MEDICATIONS  (STANDING):  carvedilol 3.125 milliGRAM(s) Oral every 12 hours  enoxaparin Injectable 40 milliGRAM(s) SubCutaneous daily  lactated ringers. 1000 milliLiter(s) (100 mL/Hr) IV Continuous <Continuous>  losartan 25 milliGRAM(s) Oral daily    MEDICATIONS  (PRN):  acetaminophen   Tablet .. 650 milliGRAM(s) Oral every 6 hours PRN Mild Pain (1 - 3)  ondansetron Injectable 4 milliGRAM(s) IV Push every 6 hours PRN Nausea and/or Vomiting      Vital Signs Last 24 Hrs  T(C): 36.3 (21 Oct 2018 23:54), Max: 37.2 (21 Oct 2018 19:46)  T(F): 97.4 (21 Oct 2018 23:54), Max: 99 (21 Oct 2018 19:46)  HR: 54 (21 Oct 2018 23:54) (54 - 75)  BP: 119/52 (21 Oct 2018 23:54) (119/52 - 141/69)  BP(mean): --  RR: 18 (21 Oct 2018 23:54) (18 - 18)  SpO2: 99% (21 Oct 2018 23:54) (95% - 100%)    PE  Gen: NAD  Pulm: No increased WOB  Abd: Soft, NT, ND. Midline scar well-healed.       I&O's Detail    21 Oct 2018 07:01  -  22 Oct 2018 07:00  --------------------------------------------------------  IN:    lactated ringers.: 1100 mL  Total IN: 1100 mL    OUT:    Voided: 1100 mL  Total OUT: 1100 mL    Total NET: 0 mL          LABS:                        11.2   5.8   )-----------( 189      ( 21 Oct 2018 06:21 )             34.6     10-21    141  |  103  |  17.0  ----------------------------<  70  3.7   |  27.0  |  0.55    Ca    9.5      21 Oct 2018 06:21  Phos  2.5     10-21  Mg     2.0     10-21

## 2018-10-23 VITALS
OXYGEN SATURATION: 94 % | HEART RATE: 75 BPM | RESPIRATION RATE: 18 BRPM | TEMPERATURE: 98 F | SYSTOLIC BLOOD PRESSURE: 139 MMHG | DIASTOLIC BLOOD PRESSURE: 74 MMHG

## 2018-10-23 LAB
ANION GAP SERPL CALC-SCNC: 9 MMOL/L — SIGNIFICANT CHANGE UP (ref 5–17)
BASOPHILS # BLD AUTO: 0 K/UL — SIGNIFICANT CHANGE UP (ref 0–0.2)
BASOPHILS NFR BLD AUTO: 0.2 % — SIGNIFICANT CHANGE UP (ref 0–2)
BUN SERPL-MCNC: 10 MG/DL — SIGNIFICANT CHANGE UP (ref 8–20)
CALCIUM SERPL-MCNC: 9.6 MG/DL — SIGNIFICANT CHANGE UP (ref 8.6–10.2)
CHLORIDE SERPL-SCNC: 107 MMOL/L — SIGNIFICANT CHANGE UP (ref 98–107)
CO2 SERPL-SCNC: 27 MMOL/L — SIGNIFICANT CHANGE UP (ref 22–29)
CREAT SERPL-MCNC: 0.52 MG/DL — SIGNIFICANT CHANGE UP (ref 0.5–1.3)
EOSINOPHIL # BLD AUTO: 0.1 K/UL — SIGNIFICANT CHANGE UP (ref 0–0.5)
EOSINOPHIL NFR BLD AUTO: 2.7 % — SIGNIFICANT CHANGE UP (ref 0–6)
GLUCOSE SERPL-MCNC: 104 MG/DL — SIGNIFICANT CHANGE UP (ref 70–115)
HCT VFR BLD CALC: 34.7 % — LOW (ref 37–47)
HGB BLD-MCNC: 11.3 G/DL — LOW (ref 12–16)
LYMPHOCYTES # BLD AUTO: 1.6 K/UL — SIGNIFICANT CHANGE UP (ref 1–4.8)
LYMPHOCYTES # BLD AUTO: 39.2 % — SIGNIFICANT CHANGE UP (ref 20–55)
MAGNESIUM SERPL-MCNC: 1.8 MG/DL — SIGNIFICANT CHANGE UP (ref 1.6–2.6)
MCHC RBC-ENTMCNC: 30 PG — SIGNIFICANT CHANGE UP (ref 27–31)
MCHC RBC-ENTMCNC: 32.6 G/DL — SIGNIFICANT CHANGE UP (ref 32–36)
MCV RBC AUTO: 92 FL — SIGNIFICANT CHANGE UP (ref 81–99)
MONOCYTES # BLD AUTO: 0.3 K/UL — SIGNIFICANT CHANGE UP (ref 0–0.8)
MONOCYTES NFR BLD AUTO: 7.1 % — SIGNIFICANT CHANGE UP (ref 3–10)
NEUTROPHILS # BLD AUTO: 2.1 K/UL — SIGNIFICANT CHANGE UP (ref 1.8–8)
NEUTROPHILS NFR BLD AUTO: 50.8 % — SIGNIFICANT CHANGE UP (ref 37–73)
PHOSPHATE SERPL-MCNC: 2.4 MG/DL — SIGNIFICANT CHANGE UP (ref 2.4–4.7)
PLATELET # BLD AUTO: 204 K/UL — SIGNIFICANT CHANGE UP (ref 150–400)
POTASSIUM SERPL-MCNC: 3.6 MMOL/L — SIGNIFICANT CHANGE UP (ref 3.5–5.3)
POTASSIUM SERPL-SCNC: 3.6 MMOL/L — SIGNIFICANT CHANGE UP (ref 3.5–5.3)
RBC # BLD: 3.77 M/UL — LOW (ref 4.4–5.2)
RBC # FLD: 12.8 % — SIGNIFICANT CHANGE UP (ref 11–15.6)
SODIUM SERPL-SCNC: 143 MMOL/L — SIGNIFICANT CHANGE UP (ref 135–145)
WBC # BLD: 4.1 K/UL — LOW (ref 4.8–10.8)
WBC # FLD AUTO: 4.1 K/UL — LOW (ref 4.8–10.8)

## 2018-10-23 RX ADMIN — CARVEDILOL PHOSPHATE 3.12 MILLIGRAM(S): 80 CAPSULE, EXTENDED RELEASE ORAL at 05:32

## 2018-10-23 RX ADMIN — LOSARTAN POTASSIUM 25 MILLIGRAM(S): 100 TABLET, FILM COATED ORAL at 05:32

## 2018-10-24 PROCEDURE — 80048 BASIC METABOLIC PNL TOTAL CA: CPT

## 2018-10-24 PROCEDURE — 86901 BLOOD TYPING SEROLOGIC RH(D): CPT

## 2018-10-24 PROCEDURE — 96374 THER/PROPH/DIAG INJ IV PUSH: CPT

## 2018-10-24 PROCEDURE — 85027 COMPLETE CBC AUTOMATED: CPT

## 2018-10-24 PROCEDURE — 85610 PROTHROMBIN TIME: CPT

## 2018-10-24 PROCEDURE — 87086 URINE CULTURE/COLONY COUNT: CPT

## 2018-10-24 PROCEDURE — 74018 RADEX ABDOMEN 1 VIEW: CPT

## 2018-10-24 PROCEDURE — 83605 ASSAY OF LACTIC ACID: CPT

## 2018-10-24 PROCEDURE — 86850 RBC ANTIBODY SCREEN: CPT

## 2018-10-24 PROCEDURE — 83690 ASSAY OF LIPASE: CPT

## 2018-10-24 PROCEDURE — 36415 COLL VENOUS BLD VENIPUNCTURE: CPT

## 2018-10-24 PROCEDURE — 84100 ASSAY OF PHOSPHORUS: CPT

## 2018-10-24 PROCEDURE — 83735 ASSAY OF MAGNESIUM: CPT

## 2018-10-24 PROCEDURE — 93005 ELECTROCARDIOGRAM TRACING: CPT

## 2018-10-24 PROCEDURE — 81001 URINALYSIS AUTO W/SCOPE: CPT

## 2018-10-24 PROCEDURE — 85730 THROMBOPLASTIN TIME PARTIAL: CPT

## 2018-10-24 PROCEDURE — 96375 TX/PRO/DX INJ NEW DRUG ADDON: CPT

## 2018-10-24 PROCEDURE — 86900 BLOOD TYPING SEROLOGIC ABO: CPT

## 2018-10-24 PROCEDURE — 74176 CT ABD & PELVIS W/O CONTRAST: CPT

## 2018-10-24 PROCEDURE — 80053 COMPREHEN METABOLIC PANEL: CPT

## 2018-10-24 PROCEDURE — 99285 EMERGENCY DEPT VISIT HI MDM: CPT | Mod: 25

## 2018-11-02 ENCOUNTER — APPOINTMENT (OUTPATIENT)
Dept: COLORECTAL SURGERY | Facility: CLINIC | Age: 78
End: 2018-11-02
Payer: MEDICARE

## 2018-11-02 VITALS
SYSTOLIC BLOOD PRESSURE: 110 MMHG | HEIGHT: 62 IN | RESPIRATION RATE: 16 BRPM | TEMPERATURE: 97.9 F | WEIGHT: 127 LBS | DIASTOLIC BLOOD PRESSURE: 68 MMHG | BODY MASS INDEX: 23.37 KG/M2

## 2018-11-02 DIAGNOSIS — K56.52 INTESTINAL ADHESIONS [BANDS] WITH COMPLETE OBSTRUCTION: ICD-10-CM

## 2018-11-02 PROCEDURE — 99024 POSTOP FOLLOW-UP VISIT: CPT

## 2018-11-11 PROCEDURE — 71045 X-RAY EXAM CHEST 1 VIEW: CPT

## 2018-11-11 PROCEDURE — 74019 RADEX ABDOMEN 2 VIEWS: CPT

## 2018-11-11 PROCEDURE — 99285 EMERGENCY DEPT VISIT HI MDM: CPT | Mod: 25

## 2018-11-11 PROCEDURE — 74022 RADEX COMPL AQT ABD SERIES: CPT

## 2018-11-11 PROCEDURE — 83690 ASSAY OF LIPASE: CPT

## 2018-11-11 PROCEDURE — 96361 HYDRATE IV INFUSION ADD-ON: CPT

## 2018-11-11 PROCEDURE — 84100 ASSAY OF PHOSPHORUS: CPT

## 2018-11-11 PROCEDURE — 85027 COMPLETE CBC AUTOMATED: CPT

## 2018-11-11 PROCEDURE — 83605 ASSAY OF LACTIC ACID: CPT

## 2018-11-11 PROCEDURE — 83735 ASSAY OF MAGNESIUM: CPT

## 2018-11-11 PROCEDURE — 80048 BASIC METABOLIC PNL TOTAL CA: CPT

## 2018-11-11 PROCEDURE — 83615 LACTATE (LD) (LDH) ENZYME: CPT

## 2018-11-11 PROCEDURE — 74018 RADEX ABDOMEN 1 VIEW: CPT

## 2018-11-11 PROCEDURE — 74177 CT ABD & PELVIS W/CONTRAST: CPT

## 2018-11-11 PROCEDURE — 96374 THER/PROPH/DIAG INJ IV PUSH: CPT | Mod: XU

## 2018-11-11 PROCEDURE — 36415 COLL VENOUS BLD VENIPUNCTURE: CPT

## 2018-11-11 PROCEDURE — 96375 TX/PRO/DX INJ NEW DRUG ADDON: CPT

## 2018-11-11 PROCEDURE — 80053 COMPREHEN METABOLIC PANEL: CPT

## 2018-12-05 ENCOUNTER — APPOINTMENT (OUTPATIENT)
Dept: COLORECTAL SURGERY | Facility: CLINIC | Age: 78
End: 2018-12-05

## 2018-12-27 ENCOUNTER — EMERGENCY (EMERGENCY)
Facility: HOSPITAL | Age: 78
LOS: 1 days | Discharge: HOME | End: 2018-12-27
Attending: EMERGENCY MEDICINE
Payer: MEDICARE

## 2018-12-27 VITALS
SYSTOLIC BLOOD PRESSURE: 122 MMHG | DIASTOLIC BLOOD PRESSURE: 75 MMHG | OXYGEN SATURATION: 98 % | HEART RATE: 65 BPM | TEMPERATURE: 98 F | RESPIRATION RATE: 19 BRPM

## 2018-12-27 VITALS
DIASTOLIC BLOOD PRESSURE: 71 MMHG | OXYGEN SATURATION: 95 % | SYSTOLIC BLOOD PRESSURE: 162 MMHG | HEART RATE: 72 BPM | RESPIRATION RATE: 18 BRPM | HEIGHT: 60 IN | WEIGHT: 125 LBS | TEMPERATURE: 99 F

## 2018-12-27 DIAGNOSIS — Z90.49 ACQUIRED ABSENCE OF OTHER SPECIFIED PARTS OF DIGESTIVE TRACT: Chronic | ICD-10-CM

## 2018-12-27 DIAGNOSIS — Z87.898 PERSONAL HISTORY OF OTHER SPECIFIED CONDITIONS: Chronic | ICD-10-CM

## 2018-12-27 DIAGNOSIS — Z98.890 OTHER SPECIFIED POSTPROCEDURAL STATES: Chronic | ICD-10-CM

## 2018-12-27 DIAGNOSIS — Z93.2 ILEOSTOMY STATUS: Chronic | ICD-10-CM

## 2018-12-27 DIAGNOSIS — Z95.810 PRESENCE OF AUTOMATIC (IMPLANTABLE) CARDIAC DEFIBRILLATOR: Chronic | ICD-10-CM

## 2018-12-27 PROCEDURE — 99282 EMERGENCY DEPT VISIT SF MDM: CPT

## 2018-12-27 PROCEDURE — 99283 EMERGENCY DEPT VISIT LOW MDM: CPT

## 2018-12-27 NOTE — ED PROVIDER NOTE - PROGRESS NOTE DETAILS
Patient states she has no pain in the right elbow at this time. She is able to fully move arm without any pain or problems.

## 2018-12-27 NOTE — ED PROVIDER NOTE - ATTENDING CONTRIBUTION TO CARE
I personally saw the patient with the PA, and completed the key components of the history and physical exam. I then discussed the management plan with the PA.   gen in nad resp clear cardiac no murmur abd soft msk + tpp right elbo wwith nrom neurovasc intact no effusion

## 2018-12-27 NOTE — ED PROVIDER NOTE - PHYSICAL EXAMINATION
MSK/left elbow: Sling in place over right arm. Skin intact. No obvious deformity. Full ROM without pain. Non-tender to palpation. N/V intact. Radial pulses 2+. No midline neck or back tenderness

## 2018-12-27 NOTE — ED ADULT TRIAGE NOTE - CHIEF COMPLAINT QUOTE
pt a+ox3, [place]IB[place] airport. pt states" I fell on my right elbow, it hurts but I can move it." pt denies chest pain or SOB. right arm in sling, all pulses present and equal.

## 2018-12-27 NOTE — ED PROVIDER NOTE - MEDICAL DECISION MAKING DETAILS
78 y female presenting for right elbow pain s/p falling at airport on to elbow. Full ROM, no pain on palpation, no concern for fx. Will discharge

## 2018-12-27 NOTE — ED PROVIDER NOTE - OBJECTIVE STATEMENT
78 y Female Pmhx diverticulitis, HTN, breast cancer presents to ED for right elbow pain s/p falling at airport at approx 2300 hours. 78 y Female Pmhx diverticulitis, HTN, breast cancer BIBA to ED for right elbow pain s/p falling at airport at approx 2300 hours. Patient states she was leaving airport after returning home from [place] when she was hit with sliding doors as she went to exit. She states the doors knocked her over and caused her to land onto her right elbow. Patient states she did not hit head nor did she lose consciousness. Patient states initially her elbow hurt but has since resolved. EMS placed her right arm in sling with an ice pack which she states helps. Patient states she can move arm fully without any pain. Patient denies neck pain, shoulder pain, joint swelling, numbness, tingling, weakness, fever, chills, dizziness.

## 2019-01-15 ENCOUNTER — APPOINTMENT (OUTPATIENT)
Dept: DERMATOLOGY | Facility: CLINIC | Age: 79
End: 2019-01-15
Payer: MEDICARE

## 2019-01-15 PROCEDURE — 99214 OFFICE O/P EST MOD 30 MIN: CPT

## 2019-05-07 NOTE — ED PROVIDER NOTE - SKIN, MLM
Skin normal color for race, warm, dry and intact. No evidence of rash. PAST MEDICAL HISTORY:  Hyperparathyroidism     Kidney stones PAST MEDICAL HISTORY:  Hyperparathyroidism     Kidney stones     Obesity

## 2019-09-27 ENCOUNTER — EMERGENCY (EMERGENCY)
Facility: HOSPITAL | Age: 79
LOS: 1 days | Discharge: HOME | End: 2019-09-27
Attending: EMERGENCY MEDICINE
Payer: MEDICARE

## 2019-09-27 VITALS
RESPIRATION RATE: 18 BRPM | SYSTOLIC BLOOD PRESSURE: 153 MMHG | OXYGEN SATURATION: 97 % | HEIGHT: 60 IN | DIASTOLIC BLOOD PRESSURE: 83 MMHG | HEART RATE: 92 BPM | WEIGHT: 130.95 LBS | TEMPERATURE: 98 F

## 2019-09-27 VITALS
OXYGEN SATURATION: 97 % | HEART RATE: 75 BPM | RESPIRATION RATE: 20 BRPM | DIASTOLIC BLOOD PRESSURE: 74 MMHG | SYSTOLIC BLOOD PRESSURE: 135 MMHG | TEMPERATURE: 98 F

## 2019-09-27 DIAGNOSIS — Z98.890 OTHER SPECIFIED POSTPROCEDURAL STATES: Chronic | ICD-10-CM

## 2019-09-27 DIAGNOSIS — Z90.49 ACQUIRED ABSENCE OF OTHER SPECIFIED PARTS OF DIGESTIVE TRACT: Chronic | ICD-10-CM

## 2019-09-27 DIAGNOSIS — Z87.898 PERSONAL HISTORY OF OTHER SPECIFIED CONDITIONS: Chronic | ICD-10-CM

## 2019-09-27 DIAGNOSIS — Z95.810 PRESENCE OF AUTOMATIC (IMPLANTABLE) CARDIAC DEFIBRILLATOR: Chronic | ICD-10-CM

## 2019-09-27 DIAGNOSIS — Z93.2 ILEOSTOMY STATUS: Chronic | ICD-10-CM

## 2019-09-27 LAB
ALBUMIN SERPL ELPH-MCNC: 4.1 G/DL — SIGNIFICANT CHANGE UP (ref 3.3–5.2)
ALP SERPL-CCNC: 74 U/L — SIGNIFICANT CHANGE UP (ref 40–120)
ALT FLD-CCNC: 15 U/L — SIGNIFICANT CHANGE UP
ANION GAP SERPL CALC-SCNC: 12 MMOL/L — SIGNIFICANT CHANGE UP (ref 5–17)
APPEARANCE UR: CLEAR — SIGNIFICANT CHANGE UP
APTT BLD: 29.2 SEC — SIGNIFICANT CHANGE UP (ref 27.5–36.3)
AST SERPL-CCNC: 24 U/L — SIGNIFICANT CHANGE UP
BACTERIA # UR AUTO: ABNORMAL
BASOPHILS # BLD AUTO: 0.03 K/UL — SIGNIFICANT CHANGE UP (ref 0–0.2)
BASOPHILS NFR BLD AUTO: 0.3 % — SIGNIFICANT CHANGE UP (ref 0–2)
BILIRUB SERPL-MCNC: 0.5 MG/DL — SIGNIFICANT CHANGE UP (ref 0.4–2)
BILIRUB UR-MCNC: NEGATIVE — SIGNIFICANT CHANGE UP
BUN SERPL-MCNC: 16 MG/DL — SIGNIFICANT CHANGE UP (ref 8–20)
CALCIUM SERPL-MCNC: 10.2 MG/DL — SIGNIFICANT CHANGE UP (ref 8.6–10.2)
CHLORIDE SERPL-SCNC: 102 MMOL/L — SIGNIFICANT CHANGE UP (ref 98–107)
CO2 SERPL-SCNC: 24 MMOL/L — SIGNIFICANT CHANGE UP (ref 22–29)
COLOR SPEC: YELLOW — SIGNIFICANT CHANGE UP
CREAT SERPL-MCNC: 0.74 MG/DL — SIGNIFICANT CHANGE UP (ref 0.5–1.3)
DIFF PNL FLD: ABNORMAL
EOSINOPHIL # BLD AUTO: 0.02 K/UL — SIGNIFICANT CHANGE UP (ref 0–0.5)
EOSINOPHIL NFR BLD AUTO: 0.2 % — SIGNIFICANT CHANGE UP (ref 0–6)
EPI CELLS # UR: SIGNIFICANT CHANGE UP
GLUCOSE SERPL-MCNC: 124 MG/DL — HIGH (ref 70–115)
GLUCOSE UR QL: NEGATIVE MG/DL — SIGNIFICANT CHANGE UP
HCT VFR BLD CALC: 41.6 % — SIGNIFICANT CHANGE UP (ref 34.5–45)
HGB BLD-MCNC: 13.6 G/DL — SIGNIFICANT CHANGE UP (ref 11.5–15.5)
IMM GRANULOCYTES NFR BLD AUTO: 0.3 % — SIGNIFICANT CHANGE UP (ref 0–1.5)
INR BLD: 1.01 RATIO — SIGNIFICANT CHANGE UP (ref 0.88–1.16)
KETONES UR-MCNC: NEGATIVE — SIGNIFICANT CHANGE UP
LEUKOCYTE ESTERASE UR-ACNC: ABNORMAL
LIDOCAIN IGE QN: 21 U/L — LOW (ref 22–51)
LYMPHOCYTES # BLD AUTO: 1.41 K/UL — SIGNIFICANT CHANGE UP (ref 1–3.3)
LYMPHOCYTES # BLD AUTO: 15.5 % — SIGNIFICANT CHANGE UP (ref 13–44)
MAGNESIUM SERPL-MCNC: 2.3 MG/DL — SIGNIFICANT CHANGE UP (ref 1.6–2.6)
MCHC RBC-ENTMCNC: 29.9 PG — SIGNIFICANT CHANGE UP (ref 27–34)
MCHC RBC-ENTMCNC: 32.7 GM/DL — SIGNIFICANT CHANGE UP (ref 32–36)
MCV RBC AUTO: 91.4 FL — SIGNIFICANT CHANGE UP (ref 80–100)
MONOCYTES # BLD AUTO: 0.41 K/UL — SIGNIFICANT CHANGE UP (ref 0–0.9)
MONOCYTES NFR BLD AUTO: 4.5 % — SIGNIFICANT CHANGE UP (ref 2–14)
NEUTROPHILS # BLD AUTO: 7.18 K/UL — SIGNIFICANT CHANGE UP (ref 1.8–7.4)
NEUTROPHILS NFR BLD AUTO: 79.2 % — HIGH (ref 43–77)
NITRITE UR-MCNC: NEGATIVE — SIGNIFICANT CHANGE UP
PH UR: 6 — SIGNIFICANT CHANGE UP (ref 5–8)
PLATELET # BLD AUTO: 240 K/UL — SIGNIFICANT CHANGE UP (ref 150–400)
POTASSIUM SERPL-MCNC: 4.4 MMOL/L — SIGNIFICANT CHANGE UP (ref 3.5–5.3)
POTASSIUM SERPL-SCNC: 4.4 MMOL/L — SIGNIFICANT CHANGE UP (ref 3.5–5.3)
PROT SERPL-MCNC: 7.1 G/DL — SIGNIFICANT CHANGE UP (ref 6.6–8.7)
PROT UR-MCNC: NEGATIVE MG/DL — SIGNIFICANT CHANGE UP
PROTHROM AB SERPL-ACNC: 11.6 SEC — SIGNIFICANT CHANGE UP (ref 10–12.9)
RBC # BLD: 4.55 M/UL — SIGNIFICANT CHANGE UP (ref 3.8–5.2)
RBC # FLD: 12.8 % — SIGNIFICANT CHANGE UP (ref 10.3–14.5)
RBC CASTS # UR COMP ASSIST: ABNORMAL /HPF (ref 0–4)
SODIUM SERPL-SCNC: 138 MMOL/L — SIGNIFICANT CHANGE UP (ref 135–145)
SP GR SPEC: 1.02 — SIGNIFICANT CHANGE UP (ref 1.01–1.02)
TROPONIN T SERPL-MCNC: <0.01 NG/ML — SIGNIFICANT CHANGE UP (ref 0–0.06)
UROBILINOGEN FLD QL: NEGATIVE MG/DL — SIGNIFICANT CHANGE UP
WBC # BLD: 9.08 K/UL — SIGNIFICANT CHANGE UP (ref 3.8–10.5)
WBC # FLD AUTO: 9.08 K/UL — SIGNIFICANT CHANGE UP (ref 3.8–10.5)
WBC UR QL: SIGNIFICANT CHANGE UP

## 2019-09-27 PROCEDURE — 85730 THROMBOPLASTIN TIME PARTIAL: CPT

## 2019-09-27 PROCEDURE — 96375 TX/PRO/DX INJ NEW DRUG ADDON: CPT

## 2019-09-27 PROCEDURE — 36415 COLL VENOUS BLD VENIPUNCTURE: CPT

## 2019-09-27 PROCEDURE — 87086 URINE CULTURE/COLONY COUNT: CPT

## 2019-09-27 PROCEDURE — 99284 EMERGENCY DEPT VISIT MOD MDM: CPT | Mod: 25

## 2019-09-27 PROCEDURE — 74177 CT ABD & PELVIS W/CONTRAST: CPT | Mod: 26

## 2019-09-27 PROCEDURE — 83690 ASSAY OF LIPASE: CPT

## 2019-09-27 PROCEDURE — 83735 ASSAY OF MAGNESIUM: CPT

## 2019-09-27 PROCEDURE — 80053 COMPREHEN METABOLIC PANEL: CPT

## 2019-09-27 PROCEDURE — 84484 ASSAY OF TROPONIN QUANT: CPT

## 2019-09-27 PROCEDURE — 99284 EMERGENCY DEPT VISIT MOD MDM: CPT

## 2019-09-27 PROCEDURE — 74177 CT ABD & PELVIS W/CONTRAST: CPT

## 2019-09-27 PROCEDURE — 96365 THER/PROPH/DIAG IV INF INIT: CPT | Mod: XU

## 2019-09-27 PROCEDURE — 85027 COMPLETE CBC AUTOMATED: CPT

## 2019-09-27 PROCEDURE — 85610 PROTHROMBIN TIME: CPT

## 2019-09-27 PROCEDURE — 96366 THER/PROPH/DIAG IV INF ADDON: CPT

## 2019-09-27 PROCEDURE — 81001 URINALYSIS AUTO W/SCOPE: CPT

## 2019-09-27 RX ORDER — PANTOPRAZOLE SODIUM 20 MG/1
40 TABLET, DELAYED RELEASE ORAL ONCE
Refills: 0 | Status: COMPLETED | OUTPATIENT
Start: 2019-09-27 | End: 2019-09-27

## 2019-09-27 RX ORDER — METOCLOPRAMIDE HCL 10 MG
10 TABLET ORAL ONCE
Refills: 0 | Status: COMPLETED | OUTPATIENT
Start: 2019-09-27 | End: 2019-09-27

## 2019-09-27 RX ORDER — SUCRALFATE 1 G
1 TABLET ORAL ONCE
Refills: 0 | Status: COMPLETED | OUTPATIENT
Start: 2019-09-27 | End: 2019-09-27

## 2019-09-27 RX ORDER — SODIUM CHLORIDE 9 MG/ML
1000 INJECTION INTRAMUSCULAR; INTRAVENOUS; SUBCUTANEOUS
Refills: 0 | Status: DISCONTINUED | OUTPATIENT
Start: 2019-09-27 | End: 2019-10-04

## 2019-09-27 RX ADMIN — PANTOPRAZOLE SODIUM 40 MILLIGRAM(S): 20 TABLET, DELAYED RELEASE ORAL at 04:16

## 2019-09-27 RX ADMIN — Medication 10 MILLIGRAM(S): at 07:11

## 2019-09-27 RX ADMIN — Medication 1 GRAM(S): at 07:28

## 2019-09-27 RX ADMIN — SODIUM CHLORIDE 1000 MILLILITER(S): 9 INJECTION INTRAMUSCULAR; INTRAVENOUS; SUBCUTANEOUS at 07:11

## 2019-09-27 RX ADMIN — SODIUM CHLORIDE 150 MILLILITER(S): 9 INJECTION INTRAMUSCULAR; INTRAVENOUS; SUBCUTANEOUS at 05:39

## 2019-09-27 RX ADMIN — Medication 104 MILLIGRAM(S): at 04:16

## 2019-09-27 NOTE — ED PROVIDER NOTE - PROGRESS NOTE DETAILS
results reviewed discussed with patient, plan to advance po tolerance, patit states she still has pain, will do ct, patient refusing ct with contrast initially, then later agreed viviana out to am ed attending [facility]: CT showed dilated bile due but had choleycystomy and inflammation. Pain more in RLQ.  tolerate PO will go home on Augmentin.

## 2019-09-27 NOTE — ED PROVIDER NOTE - PHYSICAL EXAMINATION
Constitutional : Appears comfortably, talking in full sentences  Head :NC AT , no swelling  Eyes :eomi, no swelling  Mouth :mm moist,  Neck : supple, trachea in midline  Chest :Tom air entry, symm chest expansion, no distress  Heart :S1 S2 distant  Abdomen :abd soft, non tender  Musc/Skel :ext no swelling, no deformity, no spine tenderness, distal pulses present  Neuro  :AAO 3 no focal deficits Constitutional : Appears in mild distress, talking in full sentences  Head :NC AT , no swelling  Eyes :eomi, no swelling  Mouth : dry mucus membranes   Neck : supple, trachea in midline  Chest :Tom air entry, symm chest expansion, no distress, no difference in breast appearance  Heart :S1 S2 distant  Abdomen :abd soft, uncomfortable to palpation of RUQ, but there is no tenderness on palpation, irregular, poorly healed surgical scar on abd   Musc/Skel :ext no swelling, no deformity, no spine tenderness, distal pulses present  Neuro  :AAO 3 no focal deficits

## 2019-09-27 NOTE — ED ADULT NURSE NOTE - CHIEF COMPLAINT QUOTE
I have burning in my throat and pain in my stomach like a tightness in my stomach, reports right sided abd pain with nausea and vomiting, reports symptoms began 1 weeks ago, went to PMD who sent to GI

## 2019-09-27 NOTE — ED ADULT NURSE REASSESSMENT NOTE - NS ED NURSE REASSESS COMMENT FT1
pt care assumed at 0745, no apparent distress noted at this time, charting as noted. pt received Alert and Oriented to person, place, situation and time sitting on bed comfortably. pt finished PO contrast at 0750. pt awaiting ct at this time. vital signs remain stable. pt educated on plan of care, pt able to successfully teach back plan of care to RN, RN will continue to reeducate pt during hospital stay.

## 2019-09-27 NOTE — ED ADULT TRIAGE NOTE - CHIEF COMPLAINT QUOTE
I have burning in my throat and pain in my stomach like a tightness in my stomach, reports right sided abd pain with nausea and vomiting, rpeots symptoms began 1 weeks ago, went to PMD who sent to GI I have burning in my throat and pain in my stomach like a tightness in my stomach, reports right sided abd pain with nausea and vomiting, reports symptoms began 1 weeks ago, went to PMD who sent to GI

## 2019-09-27 NOTE — ED PROVIDER NOTE - PATIENT PORTAL LINK FT
You can access the FollowMyHealth Patient Portal offered by [facility] by registering at the following website: [email or web address]. By joining [facility]’s FollowMyHealth portal, you will also be able to view your health information using other applications (apps) compatible with our system.

## 2019-09-27 NOTE — ED PROVIDER NOTE - CLINICAL SUMMARY MEDICAL DECISION MAKING FREE TEXT BOX
78yo F, with hx of recurrent gastric surgeries including cholecystectomy and appendectomy, with sxs of regurgitation and epigastric pain. No hx of endoscopy. Will give karafate, reglan, protonix, check labs for LFTs, hydration, and reassess.

## 2019-09-27 NOTE — ED PROVIDER NOTE - NS ED ROS FT
no weight change, no fever or chills  no recent travel, no recent abox, no sick contacts  no recent change in medications  no rash, no bruises  no visual changes no eye discharge  no cough cold or congestion,   no sob, no chest pain  follows with cardiology  no stress test, no cath  no orthopnea, no pnd  + burning sensation in throat,+ abd pain, + n/v, no diarrhea  no endoscopy, no colonoscopy  no hematuria, no change in urinary habits  no joint pain, no deformity  no headache, no paresthesia

## 2019-09-27 NOTE — ED PROVIDER NOTE - OBJECTIVE STATEMENT
Pt is a 80 y/o F, with PMHx of breast CA, cardiomyopathy, diverticulitis, HTN, vascular heart disease, s/p defibrillator, presenting to the ED with c/o abdominal pain. Pt reports she has been having the pain, located in her mid epigastric region, described as a twisting type pain, for about one week, but worsening. She notes a burning sensation up in the throat. Associated nausea and vomiting. Denies CP, SOB, cough, fever, diarrhea, constipation, orthopnea, hematochezia, and urinary sxs. Reports no exacerbation or relieving factors. No difficulties ambulating.   Pt states she did see her PMD regarding sxs, was given protonix but reports no relief in sxs. She was advised to see GI but unable to get an appointment until next week. However, pt states the burning sensation became so intense that she came in today for eval. Notes that the burning sensation occurs at the same time abd pain. No sick contacts or travel. Pt is a 80 y/o F, with PMHx of breast CA, cardiomyopathy, diverticulitis, HTN, vascular heart disease, s/p defibrillator, s/p multiple abd surgeries, presenting to the ED with c/o abdominal pain. Pt reports she has been having the pain, located in her mid epigastric region, described as a twisting type pain, for about one week, but worsening. She notes a burning sensation up in the throat. Associated nausea and vomiting. Denies CP, SOB, cough, fever, diarrhea, constipation, orthopnea, hematochezia, and urinary sxs. Reports no exacerbation or relieving factors. No difficulties ambulating.   Pt states she did see her PMD regarding sxs, was given protonix but reports no relief in sxs. She was advised to see GI but unable to get an appointment until next week. However, pt states the burning sensation became so intense that she came in today for eval. Notes that the burning sensation occurs at the same time abd pain. No sick contacts or travel. No prior GI workup.

## 2019-09-28 LAB
CULTURE RESULTS: SIGNIFICANT CHANGE UP
SPECIMEN SOURCE: SIGNIFICANT CHANGE UP

## 2019-11-19 NOTE — H&P ADULT - PMH
Subjective   Patient ID: Boni Hernandez is a 80 y.o. male is here today for post op follow up after fall from bed, he was seen at  [facility] on 11.17.19. He has back and leg pain. It is a different pain than prior to surgery.    Patient had surgery 11.13.19  L4/5 laminectomy with posterior lateral fusion and instrumentation.     History of Present Illness     This patient returns today.  He had surgery just a week ago.  He apparently fell out of bed a few days ago and has had increased pain in his back since then.  His incision looks good and is clean and dry with no evidence of infection.    The following portions of the patient's history were reviewed and updated as appropriate: allergies, current medications, past family history, past medical history, past social history, past surgical history and problem list.    Review of Systems   Constitutional: Positive for activity change.   Respiratory: Negative for chest tightness and shortness of breath.    Cardiovascular: Negative for chest pain.   Musculoskeletal: Positive for back pain and myalgias.        Leg pain   All other systems reviewed and are negative.      Objective   Physical Exam   Constitutional: He is oriented to person, place, and time. He appears well-developed and well-nourished.   Neurological: He is oriented to person, place, and time.     Neurologic Exam     Mental Status   Oriented to person, place, and time.       Assessment/Plan   Independent Review of Radiographic Studies:      I reviewed his CT of the lumbar spine done at the [facility] after the fall.  This shows good alignment of the construct at L4-5 and no evidence of other injury.    Medical Decision Making:      I told the patient about the imaging.  I told him that from my point of view I think we need to start some physical therapy in order to get his balance back.  Otherwise we will refill his pain medications one more time and tell him that he has to walk at least  every hour.  We will check him again in a couple of weeks with another x-ray.  I also told him that if anything else happens to him he needs to insist on coming to this hospital and not somewhere else.    Boni was seen today for post-op and back pain.    Diagnoses and all orders for this visit:    Follow-up examination following surgery  -     XR Spine Lumbar 2 or 3 View; Future    Other orders  -     HYDROcodone-acetaminophen (NORCO) 5-325 MG per tablet; Take 1 tablet every 6 hours as needed for pain.      Return in about 2 weeks (around 12/4/2019).                  Breast cancer    Cardiomyopathy    Diverticulitis    HTN (hypertension)    Valvular heart disease

## 2020-01-15 ENCOUNTER — APPOINTMENT (OUTPATIENT)
Dept: DERMATOLOGY | Facility: CLINIC | Age: 80
End: 2020-01-15

## 2020-04-24 NOTE — DISCHARGE NOTE ADULT - MEDICATION SUMMARY - MEDICATIONS TO STOP TAKING
I will STOP taking the medications listed below when I get home from the hospital:  None Prior labs per patient's Nursing home:  March 27/2020: Cr/BUN: 3.6/24  March 30/2020: Cr/BUN: 3.9/24  April 6/2020: Cr/BUN: 4.8/20   - Consulted Nephrology regarding increase in Cr from prior values, f/u recommendations  - Last hemodialyses > 15 years ago  - Primary Nephrologist: Dr. Adrián Hawkins

## 2020-08-11 NOTE — PATIENT PROFILE ADULT. - SOCIAL CONCERNS
2020    TELEHEALTH EVALUATION -- Audio/Visual (During UASMU-64 public health emergency)    HPI: New consult , sleep apnea    Jayesh Xie (:  1970) has requested an audio/video evaluation for the following concern(s):  She has polycythemia and she is seeing dr. Prabhu Deutsch, want to evaluate for sleep apnea. She is complaining of snoring and daytime sleepiness and tiredness. No C/o witness apnea. She does not wakes up with gasping for air. C/o wakes up frequently during sleep . Complaint of morning headache. She  does not have restful sleep. She  does not take daily naps. She does not fall asleep while watching TV. She  does not drive. She does  have difficulty falling sleep , staying asleep  She takes Xanax, Remeron and buspar  No significant Weight gain in last 6-12 month . No sleep walking, or eating but talk in sleep . No sleep onset hallucination. No sleep paralysis. No sleep attacks    She has mild stable asthma , takes albuterol prn , once a week      Review of Systems   Constitutional: Negative for chills, diaphoresis, fatigue and fever. HENT: Negative for congestion, mouth sores, nosebleeds, postnasal drip, rhinorrhea, sinus pressure, sneezing, sore throat, trouble swallowing and voice change. Snoring    Eyes: Negative for discharge, itching and visual disturbance. Respiratory: Negative for cough, chest tightness, shortness of breath and wheezing. Cardiovascular: Negative for chest pain, palpitations and leg swelling. Gastrointestinal: Negative for abdominal pain, diarrhea, nausea and vomiting. Genitourinary: Negative for difficulty urinating and hematuria. Musculoskeletal: Negative for arthralgias, joint swelling and myalgias. Skin: Negative for rash. Allergic/Immunologic: Negative for environmental allergies and food allergies. Neurological: Negative for dizziness, tremors, weakness and headaches.    Psychiatric/Behavioral: Positive for sleep normal.  [x] No visualized signs of difficulty breathing or respiratory distress        [] Abnormal-      Musculoskeletal:   [] Normal gait with no signs of ataxia         [] Normal range of motion of neck        [] Abnormal-       Neurological:        [x] No Facial Asymmetry (Cranial nerve 7 motor function) (limited exam to video visit)          [] No gaze palsy        [] Abnormal-         Skin:        [x] No significant exanthematous lesions or discoloration noted on facial skin         [] Abnormal-            Psychiatric:       [x] Normal Affect [] No Hallucinations        [] Abnormal-     Other pertinent observable physical exam findings-     ASSESSMENT/PLAN:  1. Sleep apnea, unspecified type  She has polycythemia and she is seeing dr. Brennon Zuniga, want to evaluate for sleep apnea. She is complaining of snoring and daytime sleepiness and tiredness. C/o wakes up frequently during sleep . Complaint of morning headache. She  does not have restful sleep. Recommend to have a sleep study for further evaluation regarding sleep apnea and possible hypoxia during sleep. Patient wants to do home sleep study and does not want to go to sleep lab based because she think she may not be able to fall asleep and sleep lab. - Home Sleep Study; Future    2. Mild intermittent asthma without complication  She has mild stable asthma , takes albuterol prn , once a week    3. Insomnia, unspecified type  She has chronic insomnia. She states she takes Remeron to fall a sleep , she also takes Xanax prn for anxiety but takes at night so it helps to fall asleep    Return in about 4 weeks (around 9/8/2020) for kenrick, sleep study. Nilay Lewis is a 53 y.o. female being evaluated by a Virtual Visit (video visit) encounter to address concerns as mentioned above. A caregiver was present when appropriate.  Due to this being a TeleHealth encounter (During MDCYR-56 public health emergency), evaluation of the following organ systems was limited: Vitals/Constitutional/EENT/Resp/CV/GI//MS/Neuro/Skin/Heme-Lymph-Imm. Pursuant to the emergency declaration under the [address] [place] [address] and the [employer] and [employer] Act, this Virtual Visit was conducted with patient's (and/or legal guardian's) consent, to reduce the patient's risk of exposure to COVID-19 and provide necessary medical care. The patient (and/or legal guardian) has also been advised to contact this office for worsening conditions or problems, and seek emergency medical treatment and/or call 911 if deemed necessary. Patient identification was verified at the start of the visit: Yes    Total time spent on this encounter: 25 min    Services were provided through a video synchronous discussion virtually to substitute for in-person clinic visit. Patient and provider were located at their individual homes. --Layvonne Cogan, MD on 8/11/2020 at 12:09 PM    An electronic signature was used to authenticate this note. None

## 2020-08-24 NOTE — DISCHARGE NOTE ADULT - PATIENT PORTAL LINK FT
You can access the [facility] Patient Portal, offered by [facility], by registering with the following website: [email or web address]
CAPRINI SCORE    AGE RELATED RISK FACTORS                                                       MOBILITY RELATED FACTORS  [ ] Age 41-60 years                                            (1 Point)                  [ ] Bed rest                                                        (1 Point)  [x ] Age: 61-74 years                                           (2 Points)                [ ] Plaster cast                                                   (2 Points)  [ ] Age= 75 years                                              (3 Points)                 [ ] Bed bound for more than 72 hours                   (2 Points)    DISEASE RELATED RISK FACTORS                                               GENDER SPECIFIC FACTORS  [ ] Edema in the lower extremities                       (1 Point)                  [ ] Pregnancy                                                     (1 Point)  [ ] Varicose veins                                               (1 Point)                  [ ] Post-partum < 6 weeks                                   (1 Point)             [ ] BMI > 25 Kg/m2                                            (1 Point)                  [ ] Hormonal therapy  or oral contraception            (1 Point)                 [ ] Sepsis (in the previous month)                        (1 Point)                  [ ] History of pregnancy complications  [ ] Pneumonia or serious lung disease                                               [ ] Unexplained or recurrent                       (1 Point)           (in the previous month)                               (1 Point)  [ ] Abnormal pulmonary function test                     (1 Point)                 SURGERY RELATED RISK FACTORS  [ ] Acute myocardial infarction                              (1 Point)                 [ ]  Section                                            (1 Point)  [ ] Congestive heart failure (in the previous month)  (1 Point)                 [ ] Minor surgery                                                 (1 Point)   [ ] Inflammatory bowel disease                             (1 Point)                 [ ] Arthroscopic surgery                                        (2 Points)  [ ] Central venous access                                    (2 Points)                [ ] General surgery lasting more than 45 minutes   (2 Points)       [ ] Stroke (in the previous month)                          (5 Points)               [ ] Elective arthroplasty                                        (5 Points)                                                                                                                                               HEMATOLOGY RELATED FACTORS                                                 TRAUMA RELATED RISK FACTORS  [ ] Prior episodes of VTE                                     (3 Points)                 [ ] Fracture of the hip, pelvis, or leg                       (5 Points)  [ ] Positive family history for VTE                         (3 Points)                 [ ] Acute spinal cord injury (in the previous month)  (5 Points)  [ ] Prothrombin 78358 A                                      (3 Points)                 [ ] Paralysis  (less than 1 month)                          (5 Points)  [ ] Factor V Leiden                                             (3 Points)                 [ ] Multiple Trauma within 1 month                         (5 Points)  [ ] Lupus anticoagulants                                     (3 Points)                                                           [ ] Anticardiolipin antibodies                                (3 Points)                                                       [ ] High homocysteine in the blood                      (3 Points)                                             [ ] Other congenital or acquired thrombophilia       (3 Points)                                                [ ] Heparin induced thrombocytopenia                  (3 Points)                                          Total Score [   3       ]

## 2021-06-21 NOTE — PATIENT PROFILE ADULT. - REASON FOR REFUSAL (REFER PATIENT TO HEALTHCARE PROVIDER FOR FOLLOW-UP):
4 Eyes Skin Assessment     NAME:  Hien Arteaga  YOB: 1955  MEDICAL RECORD NUMBER:  [id number]    The patient is being assess for  Admission    I agree that 2 RN's have performed a thorough Head to Toe Skin Assessment on the patient. ALL assessment sites listed below have been assessed. Areas assessed by both nurses:    Head, Face, Ears, Shoulders, Back, Chest, Arms, Elbows, Hands, Sacrum. Buttock, Coccyx, Ischium and Legs. Feet and Heels        Does the Patient have a Wound?  No noted wound(s)       [place] Prevention initiated:  NA   Wound Care Orders initiated:  No    Pressure Injury (Stage 3,4, Unstageable, DTI, NWPT, and Complex wounds) if present place consult order under [de-identified] No    New and Established Ostomies if present place consult order under : No      Nurse 1 eSignature: Electronically signed by Melchor Milian RN on 6/21/21 at 9:50 AM EDT    **SHARE this note so that the co-signing nurse is able to place an eSignature**    Nurse 2 eSignature: Electronically signed by Marialuisa Garcia RN on 6/21/21 at 1:07 PM EDT bad reactions

## 2021-10-01 NOTE — ED PROVIDER NOTE - TOBACCO USE
Unknown if ever smoked Graft Donor Site Bandage (Optional-Leave Blank If You Don't Want In Note): Steri-strips and a pressure bandage were applied to the donor site.

## 2021-10-05 NOTE — PROGRESS NOTE ADULT - SUBJECTIVE AND OBJECTIVE BOX
Addended byLeón Olmstead on: 10/5/2021 02:46 PM     Modules accepted: Orders Subjective and Objective:    INTERVAL HPI/OVERNIGHT EVENTS: 78 year old female recent loop ileostomy reversal. Patient states she has not had a bowel movement in 6 days. No dysuria. No fever, chills, chest pain, dyspnea. Patient was rectally disimpacted aprox a fistfull of stool. Patient Unable to have MRi due to defribrillator and dedicated T spine CT redemonstrated T9 compression fracture. TLSO brace ordered. The NGT had no output.    MEDICATIONS  (STANDING):  aspirin enteric coated 81 milliGRAM(s) Oral daily  carvedilol 6.25 milliGRAM(s) Oral every 12 hours  docusate sodium 100 milliGRAM(s) Oral three times a day  enoxaparin Injectable 40 milliGRAM(s) SubCutaneous daily  famotidine Injectable 20 milliGRAM(s) IV Push every 12 hours  lactated ringers. 1000 milliLiter(s) (125 mL/Hr) IV Continuous <Continuous>  losartan 25 milliGRAM(s) Oral daily  senna 2 Tablet(s) Oral at bedtime  sodium phosphate IVPB 30 milliMole(s) IV Intermittent once    MEDICATIONS  (PRN):  acetaminophen   Tablet. 650 milliGRAM(s) Oral every 6 hours PRN Mild Pain (1 - 3)  ALPRAZolam 0.25 milliGRAM(s) Oral three times a day PRN for anxiety  ondansetron Injectable 4 milliGRAM(s) IV Push every 6 hours PRN Nausea and/or Vomiting      Vital Signs Last 24 Hrs  T(C): 36.7 (2018 07:58), Max: 36.8 (2018 11:48)  T(F): 98.1 (2018 07:58), Max: 98.2 (2018 11:48)  HR: 89 (2018 07:58) (88 - 98)  BP: 121/66 (2018 07:58) (119/60 - 138/66)  BP(mean): --  RR: 18 (2018 07:58) (18 - 20)  SpO2: 98% (2018 07:58) (94% - 98%)    PE  Gen:  Pulm:  CV:  Abd:  :  Ext:  Vasc:  Neuro:      I&O's Detail    2018 07:01  -  2018 07:00  --------------------------------------------------------  IN:    lactated ringers.: 1250 mL  Total IN: 1250 mL    OUT:  Total OUT: 0 mL    Total NET: 1250 mL          LABS:                        11.0   5.9   )-----------( 187      ( 2018 07:44 )             34.5     07-02    140  |  102  |  17.0  ----------------------------<  95  4.2   |  25.0  |  0.55    Ca    8.8      2018 07:44  Phos  1.4     07-  Mg     1.9     07    TPro  7.1  /  Alb  3.8  /  TBili  0.4  /  DBili  x   /  AST  28  /  ALT  18  /  AlkPhos  210<H>  06-30    PT/INR - ( 2018 22:26 )   PT: 11.4 sec;   INR: 1.04 ratio         PTT - ( 2018 22:26 )  PTT:30.5 sec  Urinalysis Basic - ( 2018 00:06 )    Color: Yellow / Appearance: Clear / S.015 / pH: x  Gluc: x / Ketone: Trace  / Bili: Negative / Urobili: Negative mg/dL   Blood: x / Protein: Negative mg/dL / Nitrite: Negative   Leuk Esterase: Trace / RBC: 3-5 /HPF / WBC 0-2   Sq Epi: x / Non Sq Epi: Occasional / Bacteria: x        RADIOLOGY & ADDITIONAL STUDIES: Subjective and Objective:    INTERVAL HPI/OVERNIGHT EVENTS: 78 year old female recent loop ileostomy reversal. Patient states she has not had a bowel movement in 6 days. No dysuria. No fever, chills, chest pain, dyspnea. Patient was rectally disimpacted aprox a fistfull of stool. Patient Unable to have MRi due to defribrillator and dedicated T spine CT redemonstrated T9 compression fracture. TLSO brace ordered. The NGT had no output.    MEDICATIONS  (STANDING):  aspirin enteric coated 81 milliGRAM(s) Oral daily  carvedilol 6.25 milliGRAM(s) Oral every 12 hours  docusate sodium 100 milliGRAM(s) Oral three times a day  enoxaparin Injectable 40 milliGRAM(s) SubCutaneous daily  famotidine Injectable 20 milliGRAM(s) IV Push every 12 hours  lactated ringers. 1000 milliLiter(s) (125 mL/Hr) IV Continuous <Continuous>  losartan 25 milliGRAM(s) Oral daily  senna 2 Tablet(s) Oral at bedtime  sodium phosphate IVPB 30 milliMole(s) IV Intermittent once    MEDICATIONS  (PRN):  acetaminophen   Tablet. 650 milliGRAM(s) Oral every 6 hours PRN Mild Pain (1 - 3)  ALPRAZolam 0.25 milliGRAM(s) Oral three times a day PRN for anxiety  ondansetron Injectable 4 milliGRAM(s) IV Push every 6 hours PRN Nausea and/or Vomiting      Vital Signs Last 24 Hrs  T(C): 36.7 (2018 07:58), Max: 36.8 (2018 11:48)  T(F): 98.1 (2018 07:58), Max: 98.2 (2018 11:48)  HR: 89 (2018 07:58) (88 - 98)  BP: 121/66 (2018 07:58) (119/60 - 138/66)  BP(mean): --  RR: 18 (2018 07:58) (18 - 20)  SpO2: 98% (2018 07:58) (94% - 98%)      PE  Gen: Well-developed, well nourished, AAO x3  Pulm: CTAB, normal breath sounds  CV: RRR, no murmur  Abd: soft, mild tenderness upon deep palpation, no guarding, no rebound tenderness  Ext: no cyanosis, clubbing or edema  Vasc: warm and well perfused        I&O's Detail    2018 07:01  -  2018 07:00  --------------------------------------------------------  IN:    lactated ringers.: 1250 mL  Total IN: 1250 mL    OUT:  Total OUT: 0 mL    Total NET: 1250 mL          LABS:                        11.0   5.9   )-----------( 187      ( 2018 07:44 )             34.5     07-02    140  |  102  |  17.0  ----------------------------<  95  4.2   |  25.0  |  0.55    Ca    8.8      2018 07:44  Phos  1.4     07-  Mg     1.9     07-    TPro  7.1  /  Alb  3.8  /  TBili  0.4  /  DBili  x   /  AST  28  /  ALT  18  /  AlkPhos  210<H>  06-30    PT/INR - ( 2018 22:26 )   PT: 11.4 sec;   INR: 1.04 ratio         PTT - ( 2018 22:26 )  PTT:30.5 sec  Urinalysis Basic - ( 2018 00:06 )    Color: Yellow / Appearance: Clear / S.015 / pH: x  Gluc: x / Ketone: Trace  / Bili: Negative / Urobili: Negative mg/dL   Blood: x / Protein: Negative mg/dL / Nitrite: Negative   Leuk Esterase: Trace / RBC: 3-5 /HPF / WBC 0-2   Sq Epi: x / Non Sq Epi: Occasional / Bacteria: x        RADIOLOGY & ADDITIONAL STUDIES: Subjective and Objective:    INTERVAL HPI/OVERNIGHT EVENTS: 78 year old female recent loop ileostomy reversal. Patient states she has not had a bowel movement in 6 days. No dysuria. No fever, chills, chest pain, dyspnea. Patient was rectally disimpacted aprox a fistful of stool. Patient unable to have MRI due to defribrillator and dedicated T spine CT redemonstrated T9 compression fracture. TLSO brace ordered. The NGT had no output.    MEDICATIONS  (STANDING):  aspirin enteric coated 81 milliGRAM(s) Oral daily  carvedilol 6.25 milliGRAM(s) Oral every 12 hours  docusate sodium 100 milliGRAM(s) Oral three times a day  enoxaparin Injectable 40 milliGRAM(s) SubCutaneous daily  famotidine Injectable 20 milliGRAM(s) IV Push every 12 hours  lactated ringers. 1000 milliLiter(s) (125 mL/Hr) IV Continuous <Continuous>  losartan 25 milliGRAM(s) Oral daily  senna 2 Tablet(s) Oral at bedtime  sodium phosphate IVPB 30 milliMole(s) IV Intermittent once    MEDICATIONS  (PRN):  acetaminophen   Tablet. 650 milliGRAM(s) Oral every 6 hours PRN Mild Pain (1 - 3)  ALPRAZolam 0.25 milliGRAM(s) Oral three times a day PRN for anxiety  ondansetron Injectable 4 milliGRAM(s) IV Push every 6 hours PRN Nausea and/or Vomiting      Vital Signs Last 24 Hrs  T(C): 36.7 (2018 07:58), Max: 36.8 (2018 11:48)  T(F): 98.1 (2018 07:58), Max: 98.2 (2018 11:48)  HR: 89 (2018 07:58) (88 - 98)  BP: 121/66 (2018 07:58) (119/60 - 138/66)  BP(mean): --  RR: 18 (2018 07:58) (18 - 20)  SpO2: 98% (2018 07:58) (94% - 98%)      PE  Gen: Well-developed, well nourished, AAO x3  Pulm: CTAB, normal breath sounds  CV: RRR, no murmur  Abd: soft, mild tenderness upon deep palpation, no guarding, no rebound tenderness  Ext: no cyanosis, clubbing or edema  Vasc: warm and well perfused        I&O's Detail    2018 07:01  -  2018 07:00  --------------------------------------------------------  IN:    lactated ringers.: 1250 mL  Total IN: 1250 mL    OUT:  Total OUT: 0 mL    Total NET: 1250 mL          LABS:                        11.0   5.9   )-----------( 187      ( 2018 07:44 )             34.5     07-02    140  |  102  |  17.0  ----------------------------<  95  4.2   |  25.0  |  0.55    Ca    8.8      2018 07:44  Phos  1.4     07-02  Mg     1.9     07-    TPro  7.1  /  Alb  3.8  /  TBili  0.4  /  DBili  x   /  AST  28  /  ALT  18  /  AlkPhos  210<H>  06-30    PT/INR - ( 2018 22:26 )   PT: 11.4 sec;   INR: 1.04 ratio         PTT - ( 2018 22:26 )  PTT:30.5 sec  Urinalysis Basic - ( 2018 00:06 )    Color: Yellow / Appearance: Clear / S.015 / pH: x  Gluc: x / Ketone: Trace  / Bili: Negative / Urobili: Negative mg/dL   Blood: x / Protein: Negative mg/dL / Nitrite: Negative   Leuk Esterase: Trace / RBC: 3-5 /HPF / WBC 0-2   Sq Epi: x / Non Sq Epi: Occasional / Bacteria: x        RADIOLOGY & ADDITIONAL STUDIES:

## 2021-12-03 NOTE — PRE-OP CHECKLIST - TEMPERATURE IN CELSIUS (DEGREES C)
Vaccine Information Statement(s) or the Emergency Use Authorization was given today. This has been reviewed, questions answered, and verbal consent given by Parent for injection(s) and administration of COVID-19 Immunization Pfizer COVID-19.      Patient tolerated without incident. See immunization grid for documentation.         36.9

## 2022-01-12 NOTE — PATIENT PROFILE ADULT. - HEALTH/HEALTHCARE ANXIETIES, PROFILE
You can access the FollowMyHealth Patient Portal offered by [facility] by registering at the following website: [email or web address]. By joining [facility]’s FollowMyHealth portal, you will also be able to view your health information using other applications (apps) compatible with our system.
none

## 2022-04-01 NOTE — ED ADULT NURSE NOTE - OBJECTIVE STATEMENT
per pt she has had right sided lower quadrant tightness, pt aox3 moving all extremities equal bilaterally, pt denies fevers sweats chills denies chest pain denies shortness of breath per pt she vomited one time tonight and pt denies diarrhea
Attending Attestation (For Attendings USE Only)...

## 2022-04-25 NOTE — PHYSICAL THERAPY INITIAL EVALUATION ADULT - DIAGNOSIS, PT EVAL
Date of Service: 04/21/2022    CHIEF COMPLAINT:    Follow up on malaise, fatigue, uncontrolled hypertension, polypharmacy, obesity and depression, as well as insomnia. HISTORY OF PRESENT ILLNESS:    The patient is an 67-year-old Finnish-speaking female who presents today to address the above-mentioned problems. The patient's allergies, medications and problem list were reviewed and updated in Epic. The patient tells me that she still has some malaise and fatigue, however, it appears to be more of a baseline, generally starting to feel better. The patient did have some sinus tachycardia, had an echocardiogram done and wanted to discuss the results. I reviewed the results in detail with her and essentially was only significant for mild LVH. She also has some aortic sclerosis without significant stenosis. As far as her hypertension is concerned, the blood pressure is labile. She denies really low numbers. Sometimes she is in 864O systolic, sometimes in 782O. She is on Labetalol 300 mg b.i.d. and Losartan 100 mg daily. She is also on Furosemide 40 mg daily. Denies worsening chest pain or shortness of breath. As far as her weight is concerned, it has been about the same. She does admit to dietary indiscretions. She does have a major depressive disorder, however, she has refused to be treated on multiple occasions. No suicidal ideations. She continues to have problems with insomnia and tells me that she needs to take Zolpidem 5 mg on a nightly basis. Otherwise, she cannot fall asleep. As far as her hyperlipidemia is concerned, she has been compliant with Atorvastatin. Denies myalgias or abdominal discomfort associated with it. PHYSICAL EXAMINATION:    GENERAL:  The patient is a pleasant 67-year-old  female, appears to be in no acute distress, looks stated age. VITAL SIGNS:  Reviewed in Epic. Blood pressure 142/79.   LUNGS:  Clear to auscultation bilaterally. HEART:  Regular rate and rhythm, normal S1, S2.  ABDOMEN:  Soft, nontender, nondistended. Normoactive bowel sounds. EXTREMITIES:  She has varicose veins bilaterally with mostly nonpitting edema, trace left more than right. This is her baseline. PSYCHIATRIC:  She is awake, alert, oriented x3, appropriate mood and affect. LABORATORY DATA:    Reviewed in Epic and discussed with the patient. ASSESSMENT AND PLAN:    1. Uncontrolled hypertension. The patient's blood pressure is labile. At this point, she would also qualify for resistant hypertension. The patient is not willing to see a specialist for that or make any adjustments in her medications. I told her that in view of her age, it is acceptable for me to have her systolic blood pressure less than 150. She will monitor closely at home. 2.  Class I severe obesity due to excess calories with serious comorbidities and body mass index of 30-34.9. The patient was extensively counseled on lifestyle and dietary modifications. 3.  Major depressive disorder, recurrent episode, mild. The patient appears to be stable. She is not interested in being treated or referred. 4.  Hyperlipidemia, unspecified hyperlipidemia, type. The patient will continue with Atorvastatin and have her lipid panel rechecked. 5.  Chronic insomnia. PDMP (Prescription Drug Monitoring Program) was reviewed. No aberrant behavior was noted. The patient will continue with Zolpidem. I advised her to try not to use it on a nightly basis. She is aware of potential side effects. She was counseled on sleep hygiene. 6.  I defer the management of the patient's diabetes mellitus type 2, controlled, with diabetic polyneuropathy with long-term current use of Insulin to Pauly Ordonez, diabetic nurse practitioner. The patient's last hemoglobin A1c was 6.5 on 04/05/2022.   7.  Health maintenance was reviewed and updated in Epic.      I have discussed the patient's medical condition, proposed management plan, risks, benefits and alternatives with the patient in detail. She understands and is agreeable. She will follow up with me in 2 months when she will be also due for Medicare wellness visit.       Dictated By: Edilberto Blakely MD  Signing Provider: MD KOKI Robles/marixa ([id number])   DD: 04/21/2022 2:57:10 PM TD: 04/24/2022 10:59:20 PM gait disturbance due to decreased balance, pain, and deconditioning due to illness.

## 2022-10-20 NOTE — ED ADULT NURSE NOTE - ED STAT RN HANDOFF DETAILS
[Follow-Up: _____] : a [unfilled] follow-up visit
Pt alert and oriented, no apparent distress noted at this time. Pt handed off to HILTON jesus  in stable condition.

## 2022-12-12 NOTE — CONSULT NOTE ADULT - BACK
Pt from [facility], independent in ambulation.
detailed exam

## 2023-01-09 NOTE — DISCHARGE NOTE ADULT - CARE PROVIDER_API CALL
[facility],   [address] - [address] [place] [address]  Phone: [phone]  Fax: (   )    - Strong peripheral pulses

## 2023-02-17 NOTE — ED ADULT NURSE NOTE - OBJECTIVE STATEMENT
patient presented to ED with pain at right elbow as per patient " [place] and I hit my elbow with door". mild redness at elbow noted. patient denies any pain at the time of assessment. No change in ROM noted. No pain on ROM noted. Oxybutynin Counseling:  I discussed with the patient the risks of oxybutynin including but not limited to skin rash, drowsiness, dry mouth, difficulty urinating, and blurred vision.

## 2023-02-17 NOTE — DISCHARGE NOTE ADULT - CARE PLAN
[Principal Discharge DX:	SBO (small bowel obstruction)  Goal:	Resolution of SBO  Assessment and plan of treatment:	Non-operative management of SBO. Bowel function returned, patient is tolerating regular diet without nausea and vomiting. If you begin to have similar symptoms of abdominal pain, fever, chills, nausea, vomiting, constipation, diarrhea, please come back to the ED.   Please follow up with the [facility] clinic in 7-10 days.   [facility]  [phone]  [address] [place] [address] 1st floor  [place] [address]  Secondary Diagnosis:	UTI (urinary tract infection)  Goal:	Resolution of infection  Assessment and plan of treatment:	You have been placed on macrobid 100mg twice a day for a 5 day course. Please take all of the medications until you run out. Principal Discharge DX:	SBO (small bowel obstruction)  Goal:	Resolution of SBO  Assessment and plan of treatment:	Non-operative management of SBO. Bowel function returned, patient is tolerating regular diet without nausea and vomiting. If you begin to have similar symptoms of abdominal pain, fever, chills, nausea, vomiting, constipation, diarrhea, please come back to the ED.   Please follow up with the [facility] clinic in 14 days.   [facility]  [phone]  [address] [place] [address] 1st floor  [place] [address]  Secondary Diagnosis:	UTI (urinary tract infection)  Goal:	Resolution of infection  Assessment and plan of treatment:	You have been placed on macrobid 100mg twice a day for a 5 day course. Please take all of the medications until you run out.

## 2023-05-18 NOTE — DISCHARGE NOTE ADULT - FUNCTIONAL SCREEN CURRENT LEVEL: AMBULATION, MLM
(2) assistive person Pt eval, treatment and plan contemporaneously with GENO Leyva. Agree with notes. Jose M GOODE

## 2023-05-18 NOTE — PHYSICAL THERAPY INITIAL EVALUATION ADULT - THERAPY FREQUENCY, PT EVAL
2-3x/week Dorsal Nasal Flap Text: The defect edges were debeveled with a #15 scalpel blade.  Given the location of the defect and the proximity to free margins a dorsal nasal flap was deemed most appropriate.  Using a sterile surgical marker, an appropriate dorsal nasal flap was drawn around the defect.    The area thus outlined was incised deep to adipose tissue with a #15 scalpel blade.  The skin margins were undermined to an appropriate distance in all directions utilizing iris scissors.

## 2023-06-19 NOTE — H&P PST ADULT - AIRWAY
JAS spoke with patient's  who explained the patient is experiening extreme pain in her low back and has been seen in the ED twice at the end of last week at [facility].     JAS was able to schedule patient for at telephone visit on 6/19/2023 with Dr. Chapa. Patient and  are concerned she is unable to transfer in and out of a car.    They were appreciative of the return call and had no further questions.    JAS Delcid   normal

## 2023-07-07 NOTE — PATIENT PROFILE ADULT - NSPROIMPLANTSMEDDEV_GEN_A_NUR
Automatic Implantable Cardioverter Defibrillator/Pacemaker Helical Rim Advancement Flap Text: The defect edges were debeveled with a #15 blade scalpel.  Given the location of the defect and the proximity to free margins (helical rim) a double helical rim advancement flap was deemed most appropriate. Using a sterile surgical marker, the appropriate advancement flaps were drawn incorporating the defect and placing the expected incisions between the helical rim and antihelix where possible.  The area thus outlined was incised through and through with a #15 scalpel blade.  With a skin hook and iris scissors, the flaps were gently and sharply undermined and freed up. Folllowing this, the designed flaps were placed into the primary defect and sutured into place.

## 2024-02-08 NOTE — ED PROVIDER NOTE - AGGRAVATING FACTORS
[facility]  ORTHOPAEDICS AND SPORTS MEDICINE  DATE OF VISIT: 02/08/24  Follow Up Visit for Visco Injection    CHIEF COMPLAINT:   Chief Complaint   Patient presents with    Injections     Pt is here today for right knee Gelsyn injection #2 of 3.          Nhung Porter returns for follow up visit for visco supplement injection 2.  She reports symptoms are improved    Physical Exam:   General: Alert and oriented x3, no acute distress  Cardiovascular/pulmonary: No labored breathing, peripheral perfusion intact  Musculoskeletal:    right knee:    Range of motion is functional   Patella is stable tracking midline  There is no laxity with varus/valgus stress at 0 and 30 degrees of flexion.    There is no tenderness to palpation along the medial joint line.    There is no tenderness to palpation along the lateral joint line       Imaging: Reviewed     Procedure Note: Knee viscosupplementation injection     The right knee was identified as the injection site. The risk and benefits of injection were explained and the patient consented to the injection. Under sterile conditions, the knee was injected with a full dose of Gelsyn-3. A sterile bandage was applied.    Administrations This Visit       sodium hyaluronate (Viscosup) injection SOSY 16.8 mg       Admin Date  02/08/2024 Action  Given Dose  16.8 mg Route  Intra-artICUlar Administered By  Judi Ryan RN                      Assessment/Plan: S/p right knee Gelsyn-3 injection #2 for osteoarthritis  -Continue activity modification/NSAIDS/ICE prn  -f/u in 2 weeks for final injection      LLUVIA Lagunas-CNP  Orthopedic Surgery   02/08/24  4:23 PM          
none

## 2025-02-06 NOTE — DISCHARGE NOTE ADULT - PROVIDER TOKENS
FREE:[LAST:[acute care surgery outpatient clinic],PHONE:[phone]],FAX:[(   [phone]    -],ADDRESS:[[address] [place] [address]]] Yes